# Patient Record
Sex: FEMALE | Race: WHITE | Employment: OTHER | URBAN - METROPOLITAN AREA
[De-identification: names, ages, dates, MRNs, and addresses within clinical notes are randomized per-mention and may not be internally consistent; named-entity substitution may affect disease eponyms.]

---

## 2017-02-27 ENCOUNTER — TELEPHONE (OUTPATIENT)
Dept: FAMILY MEDICINE | Facility: CLINIC | Age: 57
End: 2017-02-27

## 2017-02-27 NOTE — LETTER
[facility]  [address]  [facility] [address] [phone]                                                                                                           Martha Neal  [address] [address]    February 27, 2017          Dear Martha Neal,      We care about your well-being!  In order to ensure we are providing the best quality care, we have reviewed your chart and see that you are due for:     ___x__ Physical   _____ Pap Smear   _____ Mammogram   _____ Diabetes Followup   _____ Hypertension Followup   _____ Cholesterol Followup (Provider Appointment)   _____ Cholesterol Test (Lab-Only Appointment)   __x___ Other:  FIT (option other than colonoscopy, last 12/2015)     We can assist you in scheduling these appointments at [phone].    If you have had (or plan to have) any of these tests done at a facility other than [facility] or a [facility], please have the results from these tests sent to your primary physician at [facility].                 Sincerely,    JARED Carbajal

## 2017-02-27 NOTE — TELEPHONE ENCOUNTER
Panel Management Review      Patient has the following on her problem list: None      Composite cancer screening  Chart review shows that this patient is due/due soon for the following Fecal Colorectal (FIT)  Summary:    Patient is due/failing the following:   FIT and PHYSICAL    Action needed:   Patient needs office visit for physical. and Patient needs referral/order: FIT    Type of outreach:    Sent letter.    Questions for provider review:    None                                                                                                                                    [facility]       Chart routed to self.

## 2017-05-11 ENCOUNTER — OFFICE VISIT (OUTPATIENT)
Dept: FAMILY MEDICINE | Facility: CLINIC | Age: 57
End: 2017-05-11
Payer: COMMERCIAL

## 2017-05-11 VITALS
TEMPERATURE: 97.4 F | OXYGEN SATURATION: 98 % | WEIGHT: 165 LBS | DIASTOLIC BLOOD PRESSURE: 86 MMHG | HEART RATE: 74 BPM | SYSTOLIC BLOOD PRESSURE: 156 MMHG | BODY MASS INDEX: 29.7 KG/M2 | RESPIRATION RATE: 14 BRPM

## 2017-05-11 DIAGNOSIS — M54.50 ACUTE LEFT-SIDED LOW BACK PAIN WITHOUT SCIATICA: Primary | ICD-10-CM

## 2017-05-11 DIAGNOSIS — M51.26 LUMBAR DISC HERNIATION: ICD-10-CM

## 2017-05-11 PROCEDURE — 99214 OFFICE O/P EST MOD 30 MIN: CPT | Performed by: PHYSICIAN ASSISTANT

## 2017-05-11 RX ORDER — LORAZEPAM 0.5 MG/1
0.5 TABLET ORAL EVERY 8 HOURS PRN
Qty: 15 TABLET | Refills: 0 | Status: SHIPPED | OUTPATIENT
Start: 2017-05-11 | End: 2017-06-27

## 2017-05-11 RX ORDER — METHYLPREDNISOLONE 4 MG
TABLET, DOSE PACK ORAL
Qty: 21 TABLET | Refills: 0 | Status: SHIPPED | OUTPATIENT
Start: 2017-05-11 | End: 2017-06-27

## 2017-05-11 RX ORDER — HYDROCODONE BITARTRATE AND ACETAMINOPHEN 5; 325 MG/1; MG/1
1-2 TABLET ORAL EVERY 8 HOURS PRN
Qty: 30 TABLET | Refills: 0 | Status: SHIPPED | OUTPATIENT
Start: 2017-05-11 | End: 2017-06-27

## 2017-05-11 NOTE — MR AVS SNAPSHOT
After Visit Summary   5/11/2017    Martha Neal    MRN: [id number]           Patient Information     Date Of Birth          1960        Visit Information        Provider Department      5/11/2017 3:30 PM Jinny Raya PA-C [facility]        Today's Diagnoses     Acute left-sided low back pain without sciatica    -  1    Lumbar disc herniation          Care Instructions      Self-Care for Back Pain    Principles to encourage healing of musculoskeletal back pain:  1) Reduce Strain  2) Practice Relaxation  3) Good Posture  4) Progressive Stretching  5) Get a good night's sleep    Avoid muscle tensing habits and activities that put strain on the back.  Remind yourself regularly to determine if you are doing any tensing habits. Use reminders methods such as stickers or timers.  If noticed, replace these negative habits with a positive habit of having your head up, chest up, chin in, and shoulders down and back.       Back tensing    Sleeping on your stomach    Tensing or twist the back often    Sitting for hours at a time most days    Lifting objects by bending at the waist   Slouching while standing or walking    Slouching forward while sitting in a chair    Carrying heavy objects while bent at waist     Twisting the back to one side when working     Avoid tensing and poor posture while driving     Avoid events or activities that trigger the pain.  Use a pain diary to review daily activities that aggravate the pain and change your behavior.    Practice relaxation and deep breathing  Lay down on your back and take a deep breath from your abdomen up. Let it out slowly as you allow your back, neck and shoulders to relax.  This can calm you, reduce stress, and decrease muscle tensing.     Good Posture  Sitting:  Keep your butt to the back of the chair and sit with your shoulders and head up. This will help reduce strain to the back muscles.  Closely monitor  your back position (under your shoulders) to maintain balanced and relaxed head, neck, shoulder and back muscles.  Support your mid back with a rolled towel and lean back so that your the shoulders fall back, your neck is relaxed, and your head is up.    Lifting:  Use the squat lift for heavy objects. Start with the object between your legs.  Squat down, keep your head up, shoulders back, and spine erect.  Bend at the hips, knees, and ankles as you lift.   Use the golfer's lift for light objects by using some support (a chair, a desk, or a putter).  Put your hand on the support to take the load off your back as you bend over.      Progressive Stretching.  Stretch the back and hip muscles and joints.  One option would be to use sun salutation yoga exercises.   Touch the toes, reach above, one leg out, other leg, butt up, butt down, other leg up, and stretch.  This routine will stretch tight paraspinous, hamstrings, hip, and quadratus lumborum muscles.  Gently stretch 4 rotations for 2 times per day to loosen up tight backs.     Get a good night s sleep with a good semi-firm mattress.  Avoid caffeinated beverages (coffee, tea, and soft drinks) later in the day.  Improve your sleep environment.  Reduce light and noise and lie on a comfortable mattress.  Reduce stimulating activities in the late evening including computer work and exercising.  Avoid sleeping on your stomach.     Apply heat or cold and massage tender muscles.   Heat or ice applications used up to four times per day can relax the muscles and reduce pain. For heat, microwave a wet towel for approximately 1 minute or until towel is warm and wrap around a hot-water bottle or heated gel pack and apply for 15 to 20 minutes. For cold, use ice wrapped in a thin cloth on the area until you first feel some numbness. Use what feels best. Heat is often used for more chronic pain conditions and cold for acute conditions. Massage tender points in the muscles    Use  anti-inflammatory and pain-reducing medications.  Short-term and occasional use of over-the-counter ibuprofen, naproxen, acetaminophen, or aspirin (without caffeine) can reduce joint and muscle pain.  Be sure to check with your provider to be sure this would be safe.  Please note that extended daily use can sometimes cause rebound pain and an extend your symptoms.            Follow-ups after your visit        Who to contact     If you have questions or need follow up information about today's clinic visit or your schedule please contact [facility] directly at [phone].  Normal or non-critical lab and imaging results will be communicated to you by PageB[employer]hart, letter or phone within 4 business days after the clinic has received the results. If you do not hear from us within 7 days, please contact the clinic through [employer] or phone. If you have a critical or abnormal lab result, we will notify you by phone as soon as possible.  Submit refill requests through [employer] or call your pharmacy and they will forward the refill request to us. Please allow 3 business days for your refill to be completed.          Additional Information About Your Visit        MyChart Information     [employer] gives you secure access to your electronic health record. If you see a primary care provider, you can also send messages to your care team and make appointments. If you have questions, please call your primary care clinic.  If you do not have a primary care provider, please call [phone] and they will assist you.        Care EveryWhere ID     This is your Care EveryWhere ID. This could be used by other organizations to access your [facility] medical records  [id number]        Your Vitals Were     Pulse Temperature Respirations Pulse Oximetry BMI (Body Mass Index)       74 97.4  F (36.3  C) (Tympanic) 14 98% 29.7 kg/m2        Blood Pressure from Last 3 Encounters:   05/11/17 156/86   08/18/16 128/64    02/28/16 144/75    Weight from Last 3 Encounters:   05/11/17 165 lb (74.8 kg)   08/18/16 166 lb (75.3 kg)   12/10/15 173 lb (78.5 kg)              Today, you had the following     No orders found for display         Today's Medication Changes          These changes are accurate as of: 5/11/17  4:20 PM.  If you have any questions, ask your nurse or doctor.               Start taking these medicines.        Dose/Directions    HYDROcodone-acetaminophen 5-325 MG per tablet   Commonly known as:  NORCO   Used for:  Acute left-sided low back pain without sciatica   Started by:  Jinny Raya PA-C        Dose:  1-2 tablet   Take 1-2 tablets by mouth every 8 hours as needed for moderate to severe pain   Quantity:  30 tablet   Refills:  0       LORazepam 0.5 MG tablet   Commonly known as:  ATIVAN   Used for:  Acute left-sided low back pain without sciatica   Started by:  Jinny Raya PA-C        Dose:  0.5 mg   Take 1 tablet (0.5 mg) by mouth every 8 hours as needed   Quantity:  15 tablet   Refills:  0       methylPREDNISolone 4 MG tablet   Commonly known as:  MEDROL DOSEPAK   Used for:  Acute left-sided low back pain without sciatica   Started by:  Jinny Raya PA-C        Follow package instructions   Quantity:  21 tablet   Refills:  0            Where to get your medicines      These medications were sent to [place] [address] [place] [address] [address]     Phone:  [phone]     methylPREDNISolone 4 MG tablet         Some of these will need a paper prescription and others can be bought over the counter.  Ask your nurse if you have questions.     Bring a paper prescription for each of these medications     HYDROcodone-acetaminophen 5-325 MG per tablet    LORazepam 0.5 MG tablet                Primary Care Provider Office Phone # Fax #    Jinny Raya PA-C [phone]       [facility]  [facility] [address]   [facility] [address]        Thank you!     Thank you for choosing [facility]  for your care. Our goal is always to provide you with excellent care. Hearing back from our patients is one way we can continue to improve our services. Please take a few minutes to complete the written survey that you may receive in the mail after your visit with us. Thank you!             Your Updated Medication List - Protect others around you: Learn how to safely use, store and throw away your medicines at www.disposemymeds.org.          This list is accurate as of: 5/11/17  4:20 PM.  Always use your most recent med list.                   Brand Name Dispense Instructions for use    calcium 600 + D 600-400 MG-UNIT per tablet   Generic drug:  calcium-vitamin D      Take 1 tablet by mouth daily       Fish Oil 1200 MG Caps      Take 1 capsule by mouth       HYDROcodone-acetaminophen 5-325 MG per tablet    NORCO    30 tablet    Take 1-2 tablets by mouth every 8 hours as needed for moderate to severe pain       LORazepam 0.5 MG tablet    ATIVAN    15 tablet    Take 1 tablet (0.5 mg) by mouth every 8 hours as needed       methylPREDNISolone 4 MG tablet    MEDROL DOSEPAK    21 tablet    Follow package instructions       VITAMIN C PO          VITAMIN D (CHOLECALCIFEROL) PO      Take 1,000 Units by mouth daily

## 2017-05-11 NOTE — NURSING NOTE
"Chief Complaint   Patient presents with     Back Pain       Initial /86 (BP Location: Right arm, Patient Position: Chair, Cuff Size: Adult Regular)  Pulse 74  Temp 97.4  F (36.3  C) (Tympanic)  Resp 14  Wt 165 lb (74.8 kg)  SpO2 98%  BMI 29.7 kg/m2 Estimated body mass index is 29.7 kg/(m^2) as calculated from the following:    Height as of 8/18/16: 5' 2.5\" (1.588 m).    Weight as of this encounter: 165 lb (74.8 kg).  Medication Reconciliation: complete    "

## 2017-05-11 NOTE — PROGRESS NOTES
SUBJECTIVE:                                                    Martha Neal is a 57 year old female who presents to clinic today with her sister Eladia for the following health issues:    Back Pain      Duration: 5 days        Specific cause: bending to tie shoes    Description:   Location of pain: low back left  Character of pain: sharp  Pain radiation:none  New numbness or weakness in legs, not attributed to pain:  no     Intensity: Currently 2/10, worst 10/10    History:   Pain interferes with job: Not applicable  History of back problems: no prior back problems  Any previous MRI or X-rays: None  Sees a specialist for back pain:  No  Therapies tried without relief: ice, heat, ibuprofen, muscle relaxers - flexeril 10 mg without help, tens unit    Alleviating factors:   Improved by: nothing      Precipitating factors:  Worsened by: Lifting, Bending and Walking    Functional and Psychosocial Screen (Wandy STarT Back):      Not performed today   Accompanying Signs & Symptoms:  Risk of Fracture:  None  Risk of Cauda Equina:  None  Risk of Infection:  None  Risk of Cancer:  None  Risk of Ankylosing Spondylitis:  Onset at age <35, male, AND morning back stiffness. no                Reviewed and updated as needed this visit by clinical staff  Tobacco  Allergies  Meds  Problems  Med Hx  Surg Hx  Fam Hx  Soc Hx        Reviewed and updated as needed this visit by Provider  Tobacco  Allergies  Meds  Problems  Med Hx  Surg Hx  Fam Hx  Soc Hx        Additional complaints: None    HPI additional notes: Martha presents today with   Chief Complaint   Patient presents with     Back Pain     Has had problems with her back in the past but usually resolves with heat and ice after a couple days.  Has been having spasms and worsening of the pain.  Has significant pain with bending her back.         ROS:  C: Negative for fever, chills, recent change in weight  Skin: Negative for worrisome rashes or lesions  Resp:  Negative for significant cough or SOB  CV: Negative for chest pain or peripheral edema  GI: Negative for nausea, abdominal pain, heartburn, or change in bowel habits  MS: Positive for lower back pain  P: Negative for changes in mood or affect  ROS otherwise negative.    Chart Review:  History   Smoking Status     Current Every Day Smoker     Packs/day: 1.00     Years: 45.00     Types: Cigarettes   Smokeless Tobacco     Never Used     Patient Active Problem List   Diagnosis     BMI 30-35     Tobacco abuse     Primary osteoarthritis of left ankle     Abnormal fasting glucose     Past Surgical History:   Procedure Laterality Date     HYSTERECTOMY  2002     HYSTERECTOMY, PAP NO LONGER INDICATED       Problem list, Medication list, Allergies, Medical/Social/Surg hx reviewed in [employer], updated as appropriate.   OBJECTIVE:                                                    /86 (BP Location: Right arm, Patient Position: Chair, Cuff Size: Adult Regular)  Pulse 74  Temp 97.4  F (36.3  C) (Tympanic)  Resp 14  Wt 165 lb (74.8 kg)  SpO2 98%  BMI 29.7 kg/m2  Body mass index is 29.7 kg/(m^2).  GENERAL: alert, active and moderate distress  HENT: Mucous mebranes moist.  ORTHO: In significant pain moving from sitting to standing, needs assistance of two people to stand.  Lumber/Thoracic Spine Exam: Tender:  lumbar spinous processes, lumbar facet joints, left para lumbar muscles  Non-tender:  left SI joint, left sciatic notch  Range of Motion:  lumbar flexion  decreased, painful, lumbar extension  decreased, painful, left lateral lumbar bending  decreased, painful, right lateral lumbar bending  decreased, painful, left lateral lumbar rotation  decreased, painful, right lateral lumbar rotation  decreased, painful  Strength:  unable to heel walk, unable to toe walk  Special tests:  positive left straight leg raise  SKIN: no suspicious lesions, no rashes  PSYCH: Alert and oriented times 3;  Able to articulate logical thoughts.  Affect is normal.    Diagnostic test results: none      ASSESSMENT/PLAN:                                                          ICD-10-CM    1. Acute left-sided low back pain without sciatica M54.5 methylPREDNISolone (MEDROL DOSEPAK) 4 MG tablet     HYDROcodone-acetaminophen (NORCO) 5-325 MG per tablet     LORazepam (ATIVAN) 0.5 MG tablet   2. Lumbar disc herniation M51.26        Discussed suspected lumbar disc herniation.  Will start on medrol dose pack and provide norco for pain control.  Discussed avoiding staying in the same position for a prolonged period of time.  May need to sleep on a hard surface with a pillow between her legs.      Call if not significant improvement by Monday or if worsening over the weekend.    Pt significant anxious and guarded secondary to pain so will do small amount of ativan to help her relax.    Please see patient instructions for treatment details.    Follow up in 7-10 days if not improving as anticipated, sooner PRN.    Jinny Raya PA-C  [facility]

## 2017-06-04 ENCOUNTER — OFFICE VISIT (OUTPATIENT)
Dept: URGENT CARE | Facility: URGENT CARE | Age: 57
End: 2017-06-04
Payer: COMMERCIAL

## 2017-06-04 VITALS
HEART RATE: 97 BPM | DIASTOLIC BLOOD PRESSURE: 80 MMHG | WEIGHT: 166.2 LBS | SYSTOLIC BLOOD PRESSURE: 122 MMHG | OXYGEN SATURATION: 98 % | BODY MASS INDEX: 29.91 KG/M2 | TEMPERATURE: 98.6 F

## 2017-06-04 DIAGNOSIS — R30.0 DYSURIA: Primary | ICD-10-CM

## 2017-06-04 LAB
ALBUMIN UR-MCNC: ABNORMAL MG/DL
APPEARANCE UR: CLEAR
BACTERIA #/AREA URNS HPF: ABNORMAL /HPF
BILIRUB UR QL STRIP: NEGATIVE
COLOR UR AUTO: YELLOW
GLUCOSE UR STRIP-MCNC: NEGATIVE MG/DL
HGB UR QL STRIP: ABNORMAL
KETONES UR STRIP-MCNC: NEGATIVE MG/DL
LEUKOCYTE ESTERASE UR QL STRIP: NEGATIVE
NITRATE UR QL: NEGATIVE
NON-SQ EPI CELLS #/AREA URNS LPF: ABNORMAL /LPF
PH UR STRIP: 5.5 PH (ref 5–7)
RBC #/AREA URNS AUTO: ABNORMAL /HPF (ref 0–2)
SP GR UR STRIP: 1.02 (ref 1–1.03)
URN SPEC COLLECT METH UR: ABNORMAL
UROBILINOGEN UR STRIP-ACNC: 0.2 EU/DL (ref 0.2–1)
WBC #/AREA URNS AUTO: ABNORMAL /HPF (ref 0–2)

## 2017-06-04 PROCEDURE — 87086 URINE CULTURE/COLONY COUNT: CPT | Performed by: FAMILY MEDICINE

## 2017-06-04 PROCEDURE — 81001 URINALYSIS AUTO W/SCOPE: CPT | Performed by: FAMILY MEDICINE

## 2017-06-04 PROCEDURE — 99213 OFFICE O/P EST LOW 20 MIN: CPT | Performed by: FAMILY MEDICINE

## 2017-06-04 RX ORDER — NITROFURANTOIN 25; 75 MG/1; MG/1
100 CAPSULE ORAL 2 TIMES DAILY
Qty: 10 CAPSULE | Refills: 0 | Status: SHIPPED | OUTPATIENT
Start: 2017-06-04 | End: 2017-06-09

## 2017-06-04 NOTE — MR AVS SNAPSHOT
After Visit Summary   6/4/2017    Martha Neal    MRN: [id number]           Patient Information     Date Of Birth          1960        Visit Information        Provider Department      6/4/2017 9:35 AM Anthony Erickson, DO [facility]        Today's Diagnoses     Dysuria    -  1       Follow-ups after your visit        Who to contact     If you have questions or need follow up information about today's clinic visit or your schedule please contact [facility] directly at [phone].  Normal or non-critical lab and imaging results will be communicated to you by EmailFilm Technologieshart, letter or phone within 4 business days after the clinic has received the results. If you do not hear from us within 7 days, please contact the clinic through [employer] or phone. If you have a critical or abnormal lab result, we will notify you by phone as soon as possible.  Submit refill requests through [employer] or call your pharmacy and they will forward the refill request to us. Please allow 3 business days for your refill to be completed.          Additional Information About Your Visit        MyChart Information     [employer] gives you secure access to your electronic health record. If you see a primary care provider, you can also send messages to your care team and make appointments. If you have questions, please call your primary care clinic.  If you do not have a primary care provider, please call [phone] and they will assist you.        Care EveryWhere ID     This is your Care EveryWhere ID. This could be used by other organizations to access your [facility] medical records  [id number]        Your Vitals Were     Pulse Temperature Pulse Oximetry Breastfeeding? BMI (Body Mass Index)       97 98.6  F (37  C) (Oral) 98% Unknown 29.91 kg/m2        Blood Pressure from Last 3 Encounters:   06/04/17 122/80   05/11/17 156/86   08/18/16 128/64    Weight from Last 3  Encounters:   06/04/17 166 lb 3.2 oz (75.4 kg)   05/11/17 165 lb (74.8 kg)   08/18/16 166 lb (75.3 kg)              We Performed the Following     UA with Microscopic reflex to Culture     Urine Culture Aerobic Bacterial          Today's Medication Changes          These changes are accurate as of: 6/4/17 10:25 AM.  If you have any questions, ask your nurse or doctor.               Start taking these medicines.        Dose/Directions    nitrofurantoin (macrocrystal-monohydrate) 100 MG capsule   Commonly known as:  MACROBID   Used for:  Dysuria   Started by:  Anthony Erickson DO        Dose:  100 mg   Take 1 capsule (100 mg) by mouth 2 times daily for 5 days   Quantity:  10 capsule   Refills:  0            Where to get your medicines      These medications were sent to [facility] [id number] [place] [facility] [address]  [address], [facility] [address]     Phone:  [phone]     nitrofurantoin (macrocrystal-monohydrate) 100 MG capsule                Primary Care Provider Office Phone # Fax #    Jinny Raya PA-C [phone]       [facility] [address]   [facility] [address]        Thank you!     Thank you for choosing [facility]  for your care. Our goal is always to provide you with excellent care. Hearing back from our patients is one way we can continue to improve our services. Please take a few minutes to complete the written survey that you may receive in the mail after your visit with us. Thank you!             Your Updated Medication List - Protect others around you: Learn how to safely use, store and throw away your medicines at www.disposemymeds.org.          This list is accurate as of: 6/4/17 10:25 AM.  Always use your most recent med list.                   Brand Name Dispense Instructions for use    calcium 600 + D 600-400 MG-UNIT per tablet   Generic drug:  calcium-vitamin D      Take 1 tablet by mouth  daily       Fish Oil 1200 MG Caps      Take 1 capsule by mouth       HYDROcodone-acetaminophen 5-325 MG per tablet    NORCO    30 tablet    Take 1-2 tablets by mouth every 8 hours as needed for moderate to severe pain       LORazepam 0.5 MG tablet    ATIVAN    15 tablet    Take 1 tablet (0.5 mg) by mouth every 8 hours as needed       methylPREDNISolone 4 MG tablet    MEDROL DOSEPAK    21 tablet    Follow package instructions       nitrofurantoin (macrocrystal-monohydrate) 100 MG capsule    MACROBID    10 capsule    Take 1 capsule (100 mg) by mouth 2 times daily for 5 days       VITAMIN C PO          VITAMIN D (CHOLECALCIFEROL) PO      Take 1,000 Units by mouth daily

## 2017-06-04 NOTE — NURSING NOTE
"Chief Complaint   Patient presents with     UTI     frequency urniation, burning sensation 3x days        Initial /80 (BP Location: Left arm, Patient Position: Chair, Cuff Size: Adult Regular)  Pulse 97  Temp 98.6  F (37  C) (Oral)  Wt 166 lb 3.2 oz (75.4 kg)  SpO2 98%  Breastfeeding? Unknown  BMI 29.91 kg/m2 Estimated body mass index is 29.91 kg/(m^2) as calculated from the following:    Height as of 8/18/16: 5' 2.5\" (1.588 m).    Weight as of this encounter: 166 lb 3.2 oz (75.4 kg).  Medication Reconciliation: complete    "

## 2017-06-04 NOTE — PROGRESS NOTES
SUBJECTIVE: Martha Neal is a 57 year old female who  presents today for a possible UTI.   Symptoms of dysuria have been going on for 2day(s).    Hematuria no.  still presentand moderate.    There is no history of fever, chills, nausea or vomiting.   This patient does  have a history of urinary tract infections.   Patient denies long duration and flank pain    No past medical history on file.  Allergies   Allergen Reactions     Banana GI Disturbance     Food GI Disturbance     Green and red bell peppers     Social History   Substance Use Topics     Smoking status: Current Every Day Smoker     Packs/day: 1.00     Years: 45.00     Types: Cigarettes     Smokeless tobacco: Never Used     Alcohol use No       ROS: CONSTITUTIONAL:NEGATIVE for fever, chills, change in weight    OBJECTIVE:  /80 (BP Location: Left arm, Patient Position: Chair, Cuff Size: Adult Regular)  Pulse 97  Temp 98.6  F (37  C) (Oral)  Wt 166 lb 3.2 oz (75.4 kg)  SpO2 98%  Breastfeeding? Unknown  BMI 29.91 kg/m2    No Flank/abd pain      ICD-10-CM    1. Dysuria R30.0 UA with Microscopic reflex to Culture     Urine Culture Aerobic Bacterial     nitrofurantoin, macrocrystal-monohydrate, (MACROBID) 100 MG capsule       Drink plenty of fluids.  Prevention and treatment of UTI's discussed.Signs and symptoms of pyelonephritis mentioned.  Follow up with primary care physician if not improving

## 2017-06-05 LAB
BACTERIA SPEC CULT: NORMAL
MICRO REPORT STATUS: NORMAL
SPECIMEN SOURCE: NORMAL

## 2017-06-27 ENCOUNTER — OFFICE VISIT (OUTPATIENT)
Dept: FAMILY MEDICINE | Facility: CLINIC | Age: 57
End: 2017-06-27
Payer: COMMERCIAL

## 2017-06-27 VITALS
OXYGEN SATURATION: 98 % | WEIGHT: 165 LBS | DIASTOLIC BLOOD PRESSURE: 82 MMHG | BODY MASS INDEX: 31.15 KG/M2 | TEMPERATURE: 97.3 F | HEIGHT: 61 IN | SYSTOLIC BLOOD PRESSURE: 130 MMHG | HEART RATE: 104 BPM | RESPIRATION RATE: 14 BRPM

## 2017-06-27 DIAGNOSIS — Z12.11 SCREENING FOR COLON CANCER: ICD-10-CM

## 2017-06-27 DIAGNOSIS — D22.9 ATYPICAL NEVI: ICD-10-CM

## 2017-06-27 DIAGNOSIS — R73.01 ABNORMAL FASTING GLUCOSE: ICD-10-CM

## 2017-06-27 DIAGNOSIS — Z00.00 ROUTINE GENERAL MEDICAL EXAMINATION AT A HEALTH CARE FACILITY: Primary | ICD-10-CM

## 2017-06-27 DIAGNOSIS — Z12.31 VISIT FOR SCREENING MAMMOGRAM: ICD-10-CM

## 2017-06-27 DIAGNOSIS — M54.50 RIGHT-SIDED LOW BACK PAIN WITHOUT SCIATICA, UNSPECIFIED CHRONICITY: ICD-10-CM

## 2017-06-27 LAB
CHOLEST SERPL-MCNC: 201 MG/DL
HBA1C MFR BLD: 6.7 % (ref 4.3–6)
HDLC SERPL-MCNC: 71 MG/DL
LDLC SERPL CALC-MCNC: 100 MG/DL
NONHDLC SERPL-MCNC: 130 MG/DL
TRIGL SERPL-MCNC: 152 MG/DL

## 2017-06-27 PROCEDURE — 83036 HEMOGLOBIN GLYCOSYLATED A1C: CPT | Performed by: PHYSICIAN ASSISTANT

## 2017-06-27 PROCEDURE — 99396 PREV VISIT EST AGE 40-64: CPT | Performed by: PHYSICIAN ASSISTANT

## 2017-06-27 PROCEDURE — 80061 LIPID PANEL: CPT | Performed by: PHYSICIAN ASSISTANT

## 2017-06-27 PROCEDURE — 36415 COLL VENOUS BLD VENIPUNCTURE: CPT | Performed by: PHYSICIAN ASSISTANT

## 2017-06-27 NOTE — PROGRESS NOTES
SUBJECTIVE:     CC: Martha Neal is an 57 year old woman who presents for preventive health visit.     Healthy Habits:    Do you get at least three servings of calcium containing foods daily (dairy, green leafy vegetables, etc.)? yes    Amount of exercise or daily activities, outside of work: 7 day(s) per week    Problems taking medications regularly No    Medication side effects: No    Have you had an eye exam in the past two years? yes    Do you see a dentist twice per year? yes    Do you have sleep apnea, excessive snoring or daytime drowsiness?no    Back pain still present with stiffness but better.  Hasn't had to take any back pain.      skin tag on left side of neck.      Fasting      Today's PHQ-2 Score:   PHQ-2 ( 1999 Pf[employer]) 6/27/2017 5/11/2017   Q1: Little interest or pleasure in doing things 0 0   Q2: Feeling down, depressed or hopeless 0 0   PHQ-2 Score 0 0       Abuse: Current or Past(Physical, Sexual or Emotional)- No  Do you feel safe in your environment - Yes    Social History   Substance Use Topics     Smoking status: Current Every Day Smoker     Packs/day: 1.00     Years: 45.00     Types: Cigarettes     Smokeless tobacco: Never Used     Alcohol use No     The patient does not drink >3 drinks per day nor >7 drinks per week.    Recent Labs   Lab Test  12/10/15   1000   CHOL  223*   HDL  67   LDL  134*   TRIG  108   NHDL  156*       Reviewed orders with patient.  Reviewed health maintenance and updated orders accordingly - Yes    Mammo Decision Support:  Patient over age 50, mutual decision to screen reflected in health maintenance.    Pertinent mammograms are reviewed under the imaging tab.  History of abnormal Pap smear: Status post benign hysterectomy. Health Maintenance and Surgical History updated.    Reviewed and updated as needed this visit by clinical staff  Tobacco  Allergies  Meds  Problems  Med Hx  Surg Hx  Fam Hx  Soc Hx          Reviewed and updated as needed this visit by  "Provider  Tobacco  Allergies  Problems  Med Hx  Surg Hx  Fam Hx  Soc Hx         ROS:  C: NEGATIVE for fever, chills, change in weight  INTEGUMENTARY/SKIN: POSITIVE for mole changes  E: NEGATIVE for vision changes or irritation  ENT: NEGATIVE for ear, mouth and throat problems  R: NEGATIVE for significant cough or SOB  B: NEGATIVE for masses, tenderness or discharge  CV: NEGATIVE for chest pain, palpitations or peripheral edema  GI: NEGATIVE for nausea, abdominal pain, heartburn, or change in bowel habits  : NEGATIVE for unusual urinary or vaginal symptoms. No vaginal bleeding.  MUSCULOSKELETAL:POSITIVE  for right sided low back pain and stiffness.  N: NEGATIVE for weakness, dizziness or paresthesias  E: NEGATIVE for temperature intolerance, skin/hair changes  P: NEGATIVE for changes in mood or affect     Problem list, Medication list, Allergies, and Medical/Social/Surgical histories reviewed in [facility] and updated as appropriate.  BP Readings from Last 3 Encounters:   06/27/17 130/82   06/04/17 122/80   05/11/17 156/86    Wt Readings from Last 3 Encounters:   06/27/17 165 lb (74.8 kg)   06/04/17 166 lb 3.2 oz (75.4 kg)   05/11/17 165 lb (74.8 kg)          OBJECTIVE:     /82  Pulse 104  Temp 97.3  F (36.3  C) (Tympanic)  Resp 14  Ht 5' 1\" (1.549 m)  Wt 165 lb (74.8 kg)  LMP  (LMP Unknown)  SpO2 98%  Breastfeeding? No  BMI 31.18 kg/m2  EXAM:  GENERAL APPEARANCE: healthy, alert and no distress  EYES: Eyes grossly normal to inspection, PERRL and conjunctivae and sclerae normal  HENT: ear canals and TM's normal, nose and mouth without ulcers or lesions, oropharynx clear and oral mucous membranes moist  NECK: no adenopathy, no asymmetry, masses, or scars and thyroid normal to palpation  RESP: lungs clear to auscultation - no rales, rhonchi or wheezes  BREAST: normal without masses, tenderness or nipple discharge and no palpable axillary masses or adenopathy  CV: regular rate and rhythm, normal S1 S2, " "no S3 or S4, no murmur, click or rub, no peripheral edema and peripheral pulses strong  ABDOMEN: soft, nontender, no hepatosplenomegaly, no masses and bowel sounds normal  MS: no musculoskeletal defects are noted, gait is age appropriate without ataxia.  Tenderness to palpation of right para lumbar muscles.  no tenderness to palpation of lumbar spine.  Negative straight leg raises.  SKIN: 5 mm hanging nevi with multiple shades of brown with piece missing from the center.  No surrounding erythema or warmth.  NEURO: Normal strength and tone, sensory exam grossly normal, mentation intact and speech normal  PSYCH: mentation appears normal and affect normal/bright    ASSESSMENT/PLAN:         ICD-10-CM    1. Routine general medical examination at a health care facility Z00.00 LIPID REFLEX TO DIRECT LDL PANEL   2. Visit for screening mammogram Z12.31 MA Screening Digital Bilateral   3. Abnormal fasting glucose R73.01 Hemoglobin A1c   4. Screening for colon cancer Z12.11 Fecal colorectal cancer screen FIT   5. Atypical nevi D22.9 DERMATOLOGY REFERRAL   6. Right-sided low back pain without sciatica, unspecified chronicity M54.5    Follow up with dermatology about mole.    Discussed persistent low back pain, previously seen May 11th.  Has improved but still has pain and stiffness.  Will work on exercises, heat and massage at home.  If not improving in 1-2 weeks, pt will call and I will put in an order for PT.    COUNSELING:   Reviewed preventive health counseling, as reflected in patient instructions       reports that she has been smoking Cigarettes.  She has a 45.00 pack-year smoking history. She has never used smokeless tobacco.  Tobacco Cessation Action Plan: Information offered: Patient not interested at this time  Estimated body mass index is 31.18 kg/(m^2) as calculated from the following:    Height as of this encounter: 5' 1\" (1.549 m).    Weight as of this encounter: 165 lb (74.8 kg).   Weight management plan: : -30 " minutes of exercise 5 days a week (walking, jogging, housework, biking).  -Eat at least 5 servings of fruits and vegetables daily.   -Eat whole-grain bread, whole-wheat pasta and brown rice instead of white grains and rice.      Counseling Resources:  ATP IV Guidelines  Pooled Cohorts Equation Calculator  Breast Cancer Risk Calculator  FRAX Risk Assessment  ICSI Preventive Guidelines  Dietary Guidelines for Americans, 2010  USDA's MyPlate  ASA Prophylaxis  Lung CA Screening    Jinny Raya PA-C  [facility]

## 2017-06-27 NOTE — NURSING NOTE
"Chief Complaint   Patient presents with     Physical     /82  Pulse 104  Temp 97.3  F (36.3  C) (Tympanic)  Resp 14  Ht 5' 1\" (1.549 m)  Wt 165 lb (74.8 kg)  LMP  (LMP Unknown)  SpO2 98%  Breastfeeding? No  BMI 31.18 kg/m2 Estimated body mass index is 31.18 kg/(m^2) as calculated from the following:    Height as of this encounter: 5' 1\" (1.549 m).    Weight as of this encounter: 165 lb (74.8 kg).  BP completed using cuff size: regular   Cathy Trinidad CMA    Health Maintenance Due   Topic Date Due     TOBACCO CESSATION COUNSELING Q1 YR  1960     FIT Q1 YR  12/13/2016     Health Maintenance reviewed at today's visit patient asked to schedule/complete:   None, Health Maintenance up to date.    "

## 2017-06-27 NOTE — LETTER
[facility]  [address]  [place] [address]  [phone]                                                                                                           Martha Neal  [address]  [place] [address]    June 28, 2017      Dear Martha,    The results of your recent tests were reviewed and are enclosed.     - Your cholesterol is high and we may need to start a cholesterol medication to decrease your risk of heart disease.  We'll recheck next year.  - Hemoglobin A1c is a test shows your blood sugar level over the last 2-3 months. A normal level for someone who does not have diabetes is 4-6 (fasting blood sugar <126) - Your A1c is slightly worsened from last year but may be higher than it should because you were on prednisone for your back, which temporarily raises your blood sugar.  I want to recheck your A1c in 3 months.  If we have another reading over 6.5, that puts you in the range of diabetes.  Work on increasing physical activity and decreasing sugars and carbs from your diet in the meantime.  - Please make a lab only appointment in 3 months for a recheck or we can recheck in mid November if you want to make sure I'm no longer on maternity leave.    Results for orders placed or performed in visit on 06/27/17   LIPID REFLEX TO DIRECT LDL PANEL   Result Value Ref Range    Cholesterol 201 (H) <200 mg/dL    Triglycerides 152 (H) <150 mg/dL    HDL Cholesterol 71 >49 mg/dL    LDL Cholesterol Calculated 100 (H) <100 mg/dL    Non HDL Cholesterol 130 (H) <130 mg/dL   Hemoglobin A1c   Result Value Ref Range    Hemoglobin A1C 6.7 (H) 4.3 - 6.0 %   Thank you for choosing [facility].  We appreciate the opportunity to serve you and look forward to supporting your healthcare needs in the future.    If you have any questions or concerns, please call me or my staff at [phone].    Sincerely,    Jinny  Norah Raya PA-C

## 2017-06-27 NOTE — MR AVS SNAPSHOT
After Visit Summary   6/27/2017    Martha Neal    MRN: [id number]           Patient Information     Date Of Birth          1960        Visit Information        Provider Department      6/27/2017 9:30 AM Jinny Raya PA-C [facility]        Today's Diagnoses     Routine general medical examination at a health care facility    -  1    Visit for screening mammogram        Abnormal fasting glucose        Screening for colon cancer        Atypical nevi        Right-sided low back pain without sciatica, unspecified chronicity          Care Instructions      Preventive Health Recommendations  Female Ages 50 - 64    Yearly exam: See your health care provider every year in order to  o Review health changes.   o Discuss preventive care.    o Review your medicines if your doctor has prescribed any.      Get a Pap test every three years (unless you have an abnormal result and your provider advises testing more often).    If you get Pap tests with HPV test, you only need to test every 5 years, unless you have an abnormal result.     You do not need a Pap test if your uterus was removed (hysterectomy) and you have not had cancer.    You should be tested each year for STDs (sexually transmitted diseases) if you're at risk.     Have a mammogram every 1 to 2 years.    Have a colonoscopy at age 50, or have a yearly FIT test (stool test). These exams screen for colon cancer.      Have a cholesterol test every 5 years, or more often if advised.    Have a diabetes test (fasting glucose) every three years. If you are at risk for diabetes, you should have this test more often.     If you are at risk for osteoporosis (brittle bone disease), think about having a bone density scan (DEXA).    Shots: Get a flu shot each year. Get a tetanus shot every 10 years.    Nutrition:     Eat at least 5 servings of fruits and vegetables each day.    Eat whole-grain bread, whole-wheat  pasta and brown rice instead of white grains and rice.    Talk to your provider about Calcium and Vitamin D.     Lifestyle    Exercise at least 150 minutes a week (30 minutes a day, 5 days a week). This will help you control your weight and prevent disease.    Limit alcohol to one drink per day.    No smoking.     Wear sunscreen to prevent skin cancer.     See your dentist every six months for an exam and cleaning.    See your eye doctor every 1 to 2 years.            Follow-ups after your visit        Additional Services     DERMATOLOGY REFERRAL       Your provider has referred you to: [facility]: [facility] [phone]   [email or web address]  [facility]: [facility] [phone]   [email or web address]    Please be aware that coverage of these services is subject to the terms and limitations of your health insurance plan.  Call member services at your health plan with any benefit or coverage questions.      Please bring the following with you to your appointment:    (1) Any X-Rays, CTs or MRIs which have been performed.  Contact the facility where they were done to arrange for  prior to your scheduled appointment.    (2) List of current medications  (3) This referral request   (4) Any documents/labs given to you for this referral                  Future tests that were ordered for you today     Open Future Orders        Priority Expected Expires Ordered    MA Screening Digital Bilateral Routine 7/27/2017 6/22/2018 6/27/2017    Fecal colorectal cancer screen FIT Routine 7/18/2017 9/19/2017 6/27/2017            Who to contact     If you have questions or need follow up information about today's clinic visit or your schedule please contact [facility] directly at [phone].  Normal or non-critical lab and imaging results will be communicated to you by MyChart, letter or phone within 4 business days  "after the clinic has received the results. If you do not hear from us within 7 days, please contact the clinic through [employer] or phone. If you have a critical or abnormal lab result, we will notify you by phone as soon as possible.  Submit refill requests through [employer] or call your pharmacy and they will forward the refill request to us. Please allow 3 business days for your refill to be completed.          Additional Information About Your Visit        [employer] Information     [employer] gives you secure access to your electronic health record. If you see a primary care provider, you can also send messages to your care team and make appointments. If you have questions, please call your primary care clinic.  If you do not have a primary care provider, please call [phone] and they will assist you.        Care EveryWhere ID     This is your Care EveryWhere ID. This could be used by other organizations to access your [place] medical records  [id number]        Your Vitals Were     Pulse Temperature Respirations Height Last Period Pulse Oximetry    104 97.3  F (36.3  C) (Tympanic) 14 5' 1\" (1.549 m) (LMP Unknown) 98%    Breastfeeding? BMI (Body Mass Index)                No 31.18 kg/m2           Blood Pressure from Last 3 Encounters:   06/27/17 130/82   06/04/17 122/80   05/11/17 156/86    Weight from Last 3 Encounters:   06/27/17 165 lb (74.8 kg)   06/04/17 166 lb 3.2 oz (75.4 kg)   05/11/17 165 lb (74.8 kg)              We Performed the Following     DERMATOLOGY REFERRAL     Hemoglobin A1c     LIPID REFLEX TO DIRECT LDL PANEL          Today's Medication Changes          These changes are accurate as of: 6/27/17  9:53 AM.  If you have any questions, ask your nurse or doctor.               Stop taking these medicines if you haven't already. Please contact your care team if you have questions.     HYDROcodone-acetaminophen 5-325 MG per tablet   Commonly known as:  NORCO   Stopped by:  Jinny Raya, " OCHOA           LORazepam 0.5 MG tablet   Commonly known as:  ATIVAN   Stopped by:  Jinny Raya PA-C           methylPREDNISolone 4 MG tablet   Commonly known as:  MEDROL DOSEPAK   Stopped by:  Jinny Raya PA-C                    Primary Care Provider Office Phone # Fax #    Jinny Raya PA-C [phone]       [facility] [address]  [place] [address]        Equal Access to Services     [facility] Hospital[facility]ELIT[facility] : Hadii aad ku hadasho Soomaali, waaxda luqadaha, qaybta kaalmada adeegyada, waxay idiin hayaan adeeg kharash larylan . So [facility] [phone].    ATENCIÓN: Si habla español, tiene a maciel disposición servicios gratuitos de asistencia lingüística. Ll[facility] [phone].    We comply with applicable federal civil rights laws and [place] laws. We do not discriminate on the basis of race, color, national origin, age, disability sex, sexual orientation or gender identity.            Thank you!     Thank you for choosing [facility]  for your care. Our goal is always to provide you with excellent care. Hearing back from our patients is one way we can continue to improve our services. Please take a few minutes to complete the written survey that you may receive in the mail after your visit with us. Thank you!             Your Updated Medication List - Protect others around you: Learn how to safely use, store and throw away your medicines at www.disposemymeds.org.          This list is accurate as of: 6/27/17  9:53 AM.  Always use your most recent med list.                   Brand Name Dispense Instructions for use Diagnosis    calcium 600 + D 600-400 MG-UNIT per tablet   Generic drug:  calcium-vitamin D      Take 1 tablet by mouth daily        Fish Oil 1200 MG Caps      Take 1 capsule by mouth        * VITAMIN C PO           * VITAMIN C PO           VITAMIN D (CHOLECALCIFEROL) PO      Take 1,000 Units by  mouth daily        * Notice:  This list has 2 medication(s) that are the same as other medications prescribed for you. Read the directions carefully, and ask your doctor or other care provider to review them with you.

## 2017-06-29 PROCEDURE — 82274 ASSAY TEST FOR BLOOD FECAL: CPT | Performed by: PHYSICIAN ASSISTANT

## 2017-06-30 DIAGNOSIS — Z12.11 SCREENING FOR COLON CANCER: ICD-10-CM

## 2017-06-30 LAB — HEMOCCULT STL QL IA: NEGATIVE

## 2017-07-07 ENCOUNTER — RADIANT APPOINTMENT (OUTPATIENT)
Dept: MAMMOGRAPHY | Facility: CLINIC | Age: 57
End: 2017-07-07
Attending: PHYSICIAN ASSISTANT
Payer: COMMERCIAL

## 2017-07-07 DIAGNOSIS — Z12.31 VISIT FOR SCREENING MAMMOGRAM: ICD-10-CM

## 2017-07-07 PROCEDURE — G0202 SCR MAMMO BI INCL CAD: HCPCS | Mod: TC

## 2017-07-11 ENCOUNTER — OFFICE VISIT (OUTPATIENT)
Dept: DERMATOLOGY | Facility: CLINIC | Age: 57
End: 2017-07-11
Payer: COMMERCIAL

## 2017-07-11 VITALS — DIASTOLIC BLOOD PRESSURE: 91 MMHG | OXYGEN SATURATION: 98 % | SYSTOLIC BLOOD PRESSURE: 139 MMHG | HEART RATE: 102 BPM

## 2017-07-11 DIAGNOSIS — L81.4 LENTIGO: ICD-10-CM

## 2017-07-11 DIAGNOSIS — D22.9 NEVUS: ICD-10-CM

## 2017-07-11 DIAGNOSIS — D48.5 NEOPLASM OF UNCERTAIN BEHAVIOR OF SKIN: Primary | ICD-10-CM

## 2017-07-11 PROCEDURE — 00000159 ZZHCL STATISTIC H-SEND OUTS PREP: Performed by: PHYSICIAN ASSISTANT

## 2017-07-11 PROCEDURE — 99203 OFFICE O/P NEW LOW 30 MIN: CPT | Mod: 25 | Performed by: PHYSICIAN ASSISTANT

## 2017-07-11 PROCEDURE — 88305 TISSUE EXAM BY PATHOLOGIST: CPT | Performed by: PHYSICIAN ASSISTANT

## 2017-07-11 PROCEDURE — 11100 HC BIOPSY SKIN/SUBQ/MUC MEM, SINGLE LESION: CPT | Performed by: PHYSICIAN ASSISTANT

## 2017-07-11 NOTE — PATIENT INSTRUCTIONS
Wound Care Instructions     FOR SUPERFICIAL WOUNDS     [facility] [phone]                       AFTER 24 HOURS YOU SHOULD REMOVE THE BANDAGE AND BEGIN DAILY DRESSING CHANGES AS FOLLOWS:     1) Remove Dressing.     2) Clean and dry the area with tap water using a Q-tip or sterile gauze pad.     3) Apply Vaseline, Aquaphor, Polysporin ointment or Bacitracin ointment over entire wound.  Do NOT use Neosporin ointment.     4) Cover the wound with a band-aid, or a sterile non-stick gauze pad and micropore paper tape      REPEAT THESE INSTRUCTIONS AT LEAST ONCE A DAY UNTIL THE WOUND HAS COMPLETELY HEALED.    It is an old wives tale that a wound heals better when it is exposed to air and allowed to dry out. The wound will heal faster with a better cosmetic result if it is kept moist with ointment and covered with a bandage.    **Do not let the wound dry out.**      Supplies Needed:      *Cotton tipped applicators (Q-tips)    *Polysporin Ointment or Bacitracin Ointment (NOT NEOSPORIN)    *Band-aids or non-stick gauze pads and micropore paper tape.      PATIENT INFORMATION:    During the healing process you will notice a number of changes. All wounds develop a small halo of redness surrounding the wound.  This means healing is occurring. Severe itching with extensive redness usually indicates sensitivity to the ointment or bandage tape used to dress the wound.  You should call our office if this develops.      Swelling  and/or discoloration around your surgical site is common, particularly when performed around the eye.    All wounds normally drain.  The larger the wound the more drainage there will be.  After 7-10 days, you will notice the wound beginning to shrink and new skin will begin to grow.  The wound is healed when you can see skin has formed over the entire area.  A healed wound has a healthy, shiny look to the surface and is red to dark pink in color to normalize.  Wounds may take  approximately 4-6 weeks to heal.  Larger wounds may take 6-8 weeks.  After the wound is healed you may discontinue dressing changes.    You may experience a sensation of tightness as your wound heals. This is normal and will gradually subside.    Your healed wound may be sensitive to temperature changes. This sensitivity improves with time, but if you re having a lot of discomfort, try to avoid temperature extremes.    Patients frequently experience itching after their wound appears to have healed because of the continue healing under the skin.  Plain Vaseline will help relieve the itching.        POSSIBLE COMPLICATIONS    BLEEDIN. Leave the bandage in place.  2. Use tightly rolled up gauze or a cloth to apply direct pressure over the bandage for 30  minutes.  3. Reapply pressure for an additional 30 minutes if necessary  4. Use additional gauze and tape to maintain pressure once the bleeding has stopped.

## 2017-07-11 NOTE — NURSING NOTE
"Initial BP (!) 139/91  Pulse 102  LMP  (LMP Unknown)  SpO2 98% Estimated body mass index is 31.18 kg/(m^2) as calculated from the following:    Height as of 6/27/17: 1.549 m (5' 1\").    Weight as of 6/27/17: 74.8 kg (165 lb). .      "

## 2017-07-11 NOTE — PROGRESS NOTES
HPI:   Martha Neal is a 57 year old female who presents for evaluation of mole on neck  chief complaint  Location: left side of the neck - is tender and there is a black dot in the middle of it.   Condition present for:  years.   Previous treatments include: none  -no personal or fhx of skin CA    Review Of Systems  Eyes: negative  Ears/Nose/Throat: negative  Respiratory: No shortness of breath, dyspnea on exertion, cough, or hemoptysis  Cardiovascular: negative  Gastrointestinal: negative  Genitourinary: negative  Musculoskeletal: negative  Neurologic: negative  Psychiatric: negative        PHYSICAL EXAM:      Skin exam performed as follows: Type 2 skin. Mood appropriate  Alert and Oriented X 3. Well developed, well nourished in no distress.  General appearance: Normal  Head including face: Normal  Eyes: conjunctiva and lids: Normal  Mouth: Lips, teeth, gums: Normal  Neck: Normal  Chest-breast/axillae: Normal  Back: Normal  Spleen and liver: Normal  Cardiovascular: Exam of peripheral vascular system by observation for swelling, varicosities, edema: Normal  Genitalia: groin, buttocks: Normal  Extremities: digits/nails (clubbing): Normal  Eccrine and Apocrine glands: Normal  Right upper extremity: Normal  Left upper extremity: Normal  Right lower extremity: Normal  Left lower extremity: Normal  Skin: Scalp and body hair: See below    1. 4 mm fleshy excoriated papule on left lateral neck    ASSESSMENT/PLAN:     1. Dermal nevus vs ISK r/o atypicality on left lateral neck. Shave bx in typical fashion .  Area cleaned with betadyne and anesthetized with 1% lidocaine with epi .  Dermablade used to remove the lesion and sent to pathology. Bleeding was cauterized. Pt tolerated procedure well.  2. Nevi, lentigos on face, neck, chest - benign in appearance no treatment needed.           Follow-up: pending path/PRN/yearly FSE  CC:   Scribed By: Nancy Rodriguez, MS, PATRISHA

## 2017-07-11 NOTE — MR AVS SNAPSHOT
After Visit Summary   7/11/2017    Martha Neal    MRN: [id number]           Patient Information     Date Of Birth          1960        Visit Information        Provider Department      7/11/2017 8:30 AM Nancy Rodriguez PA-C [facility]        Today's Diagnoses     Neoplasm of uncertain behavior of skin    -  1      Care Instructions          Wound Care Instructions     FOR SUPERFICIAL WOUNDS     [facility] [phone]                       AFTER 24 HOURS YOU SHOULD REMOVE THE BANDAGE AND BEGIN DAILY DRESSING CHANGES AS FOLLOWS:     1) Remove Dressing.     2) Clean and dry the area with tap water using a Q-tip or sterile gauze pad.     3) Apply Vaseline, Aquaphor, Polysporin ointment or Bacitracin ointment over entire wound.  Do NOT use Neosporin ointment.     4) Cover the wound with a band-aid, or a sterile non-stick gauze pad and micropore paper tape      REPEAT THESE INSTRUCTIONS AT LEAST ONCE A DAY UNTIL THE WOUND HAS COMPLETELY HEALED.    It is an old wives tale that a wound heals better when it is exposed to air and allowed to dry out. The wound will heal faster with a better cosmetic result if it is kept moist with ointment and covered with a bandage.    **Do not let the wound dry out.**      Supplies Needed:      *Cotton tipped applicators (Q-tips)    *Polysporin Ointment or Bacitracin Ointment (NOT NEOSPORIN)    *Band-aids or non-stick gauze pads and micropore paper tape.      PATIENT INFORMATION:    During the healing process you will notice a number of changes. All wounds develop a small halo of redness surrounding the wound.  This means healing is occurring. Severe itching with extensive redness usually indicates sensitivity to the ointment or bandage tape used to dress the wound.  You should call our office if this develops.      Swelling  and/or discoloration around your surgical site is common, particularly when performed around the  eye.    All wounds normally drain.  The larger the wound the more drainage there will be.  After 7-10 days, you will notice the wound beginning to shrink and new skin will begin to grow.  The wound is healed when you can see skin has formed over the entire area.  A healed wound has a healthy, shiny look to the surface and is red to dark pink in color to normalize.  Wounds may take approximately 4-6 weeks to heal.  Larger wounds may take 6-8 weeks.  After the wound is healed you may discontinue dressing changes.    You may experience a sensation of tightness as your wound heals. This is normal and will gradually subside.    Your healed wound may be sensitive to temperature changes. This sensitivity improves with time, but if you re having a lot of discomfort, try to avoid temperature extremes.    Patients frequently experience itching after their wound appears to have healed because of the continue healing under the skin.  Plain Vaseline will help relieve the itching.        POSSIBLE COMPLICATIONS    BLEEDIN. Leave the bandage in place.  2. Use tightly rolled up gauze or a cloth to apply direct pressure over the bandage for 30  minutes.  3. Reapply pressure for an additional 30 minutes if necessary  4. Use additional gauze and tape to maintain pressure once the bleeding has stopped.            Follow-ups after your visit        Who to contact     If you have questions or need follow up information about today's clinic visit or your schedule please contact [facility] directly at [phone].  Normal or non-critical lab and imaging results will be communicated to you by Style Jukeboxhart, letter or phone within 4 business days after the clinic has received the results. If you do not hear from us within 7 days, please contact the clinic through Style Jukeboxhart or phone. If you have a critical or abnormal lab result, we will notify you by phone as soon as possible.  Submit refill requests through FileThis  or call your pharmacy and they will forward the refill request to us. Please allow 3 business days for your refill to be completed.          Additional Information About Your Visit        MyChart Information     e-SENS gives you secure access to your electronic health record. If you see a primary care provider, you can also send messages to your care team and make appointments. If you have questions, please call your primary care clinic.  If you do not have a primary care provider, please call [phone] and they will assist you.        Care EveryWhere ID     This is your Care EveryWhere ID. This could be used by other organizations to access your [employer] medical records  [id number]        Your Vitals Were     Pulse Last Period Pulse Oximetry             102 (LMP Unknown) 98%          Blood Pressure from Last 3 Encounters:   07/11/17 (!) 139/91   06/27/17 130/82   06/04/17 122/80    Weight from Last 3 Encounters:   06/27/17 74.8 kg (165 lb)   06/04/17 75.4 kg (166 lb 3.2 oz)   05/11/17 74.8 kg (165 lb)              We Performed the Following     BIOPSY SKIN/SUBQ/MUC MEM, SINGLE LESION     Surgical pathology exam        Primary Care Provider Office Phone # Fax #    Jinny Raya PA-C [phone]       [facility] [address]   [facility] [address]        Equal Access to Services     SARAH DAO : Hadii aad ku hadasho Soomaali, waaxda luqadaha, qaybta kaalmada adeegyada, nikolas lyman. So [facility] [phone].    ATENCIÓN: Si habla español, tiene a maciel disposición servicios gratuitos de asistencia lingüística. Gem graff [phone].    We comply with applicable federal civil rights laws and [place] laws. We do not discriminate on the basis of race, color, national origin, age, disability sex, sexual orientation or gender identity.            Thank you!     Thank you for choosing [facility]  for your care.  Our goal is always to provide you with excellent care. Hearing back from our patients is one way we can continue to improve our services. Please take a few minutes to complete the written survey that you may receive in the mail after your visit with us. Thank you!             Your Updated Medication List - Protect others around you: Learn how to safely use, store and throw away your medicines at www.disposemymeds.org.          This list is accurate as of: 7/11/17  8:45 AM.  Always use your most recent med list.                   Brand Name Dispense Instructions for use Diagnosis    calcium 600 + D 600-400 MG-UNIT per tablet   Generic drug:  calcium-vitamin D      Take 1 tablet by mouth daily        Fish Oil 1200 MG Caps      Take 1 capsule by mouth        * VITAMIN C PO           * VITAMIN C PO           VITAMIN D (CHOLECALCIFEROL) PO      Take 1,000 Units by mouth daily        * Notice:  This list has 2 medication(s) that are the same as other medications prescribed for you. Read the directions carefully, and ask your doctor or other care provider to review them with you.

## 2017-07-13 LAB — COPATH REPORT: NORMAL

## 2017-07-14 ENCOUNTER — HOSPITAL PATHOLOGY (OUTPATIENT)
Dept: OTHER | Facility: CLINIC | Age: 57
End: 2017-07-14

## 2017-07-17 LAB — COPATH REPORT: NORMAL

## 2017-09-27 DIAGNOSIS — R73.01 ABNORMAL FASTING GLUCOSE: ICD-10-CM

## 2017-09-27 LAB — HBA1C MFR BLD: 6.5 % (ref 4.3–6)

## 2017-09-27 PROCEDURE — 83036 HEMOGLOBIN GLYCOSYLATED A1C: CPT | Performed by: PHYSICIAN ASSISTANT

## 2017-09-27 PROCEDURE — 36415 COLL VENOUS BLD VENIPUNCTURE: CPT | Performed by: PHYSICIAN ASSISTANT

## 2018-03-22 ENCOUNTER — OFFICE VISIT (OUTPATIENT)
Dept: FAMILY MEDICINE | Facility: CLINIC | Age: 58
End: 2018-03-22
Payer: COMMERCIAL

## 2018-03-22 VITALS
TEMPERATURE: 98.1 F | OXYGEN SATURATION: 99 % | HEART RATE: 124 BPM | DIASTOLIC BLOOD PRESSURE: 76 MMHG | RESPIRATION RATE: 16 BRPM | SYSTOLIC BLOOD PRESSURE: 116 MMHG | WEIGHT: 159 LBS | BODY MASS INDEX: 30.04 KG/M2

## 2018-03-22 DIAGNOSIS — R30.0 DYSURIA: Primary | ICD-10-CM

## 2018-03-22 DIAGNOSIS — K59.00 CONSTIPATION, UNSPECIFIED CONSTIPATION TYPE: ICD-10-CM

## 2018-03-22 DIAGNOSIS — E11.9 TYPE 2 DIABETES MELLITUS WITHOUT COMPLICATION, WITHOUT LONG-TERM CURRENT USE OF INSULIN (H): ICD-10-CM

## 2018-03-22 LAB
ALBUMIN UR-MCNC: 100 MG/DL
APPEARANCE UR: CLEAR
BACTERIA #/AREA URNS HPF: ABNORMAL /HPF
BILIRUB UR QL STRIP: NEGATIVE
COLOR UR AUTO: YELLOW
CREAT UR-MCNC: 262 MG/DL
GLUCOSE UR STRIP-MCNC: 100 MG/DL
HGB UR QL STRIP: ABNORMAL
KETONES UR STRIP-MCNC: ABNORMAL MG/DL
LEUKOCYTE ESTERASE UR QL STRIP: NEGATIVE
MICROALBUMIN UR-MCNC: 634 MG/L
MICROALBUMIN/CREAT UR: 241.98 MG/G CR (ref 0–25)
NITRATE UR QL: NEGATIVE
NON-SQ EPI CELLS #/AREA URNS LPF: ABNORMAL /LPF
PH UR STRIP: 5 PH (ref 5–7)
RBC #/AREA URNS AUTO: ABNORMAL /HPF
SOURCE: ABNORMAL
SP GR UR STRIP: >1.03 (ref 1–1.03)
UROBILINOGEN UR STRIP-ACNC: 0.2 EU/DL (ref 0.2–1)
WBC #/AREA URNS AUTO: ABNORMAL /HPF

## 2018-03-22 PROCEDURE — 81001 URINALYSIS AUTO W/SCOPE: CPT | Performed by: PHYSICIAN ASSISTANT

## 2018-03-22 PROCEDURE — 99214 OFFICE O/P EST MOD 30 MIN: CPT | Performed by: PHYSICIAN ASSISTANT

## 2018-03-22 PROCEDURE — 82043 UR ALBUMIN QUANTITATIVE: CPT | Performed by: PHYSICIAN ASSISTANT

## 2018-03-22 RX ORDER — CIPROFLOXACIN 250 MG/1
250 TABLET, FILM COATED ORAL 2 TIMES DAILY
Qty: 14 TABLET | Refills: 0 | Status: SHIPPED | OUTPATIENT
Start: 2018-03-22 | End: 2018-03-29

## 2018-03-22 RX ORDER — POLYETHYLENE GLYCOL 3350 17 G/17G
1 POWDER, FOR SOLUTION ORAL 2 TIMES DAILY
Qty: 510 G | Refills: 1 | Status: SHIPPED | OUTPATIENT
Start: 2018-03-22 | End: 2020-11-19

## 2018-03-22 NOTE — PROGRESS NOTES
SUBJECTIVE:   Martha Neal is a 57 year old female who presents to clinic today for the following health issues:    Constipation     Onset: monday    Description:   Frequency of bowel movements: every day.  Stool consistency: soft, hard, small caliber    Progression of Symptoms:  same    Accompanying Signs & Symptoms:  Abdominal pain (cramping?): YES  Blood in stool: no   Rectal pain: yes   Nausea/vomiting: no   Weight loss or gain: no    History:   History of abdominal surgery: YES- hysterectomy 15 years    Precipitating factors:   Recent use of narcotics, anticholinergics, calcium channel blockers, antacids, or iron supplements: no   Chronic Laxative Use: no          Therapies Tried and outcome: ducolax-some help    URINARY TRACT SYMPTOMS  Onset: 1-2 days    Description:   Painful urination (Dysuria): YES- burning, increased frequency.  Blood in urine (Hematuria): no   Delay in urine (Hesitency): no     Intensity: moderate    Progression of Symptoms:  worsening    Accompanying Signs & Symptoms:  Fever/chills: YES- chills  Flank pain YES- LBP  Nausea and vomiting: no   Any vaginal symptoms: none  Abdominal/Pelvic Pain: YES- maybe from constipation    History:   History of frequent UTI's: no   History of kidney stones: no   Sexually Active: no   Possibility of pregnancy: No    Precipitating factors:   n/a    Therapies Tried and outcome: increasing fluid intake  Reviewed and updated as needed this visit by clinical staff  Tobacco  Allergies  Meds  Problems  Med Hx  Surg Hx  Fam Hx  Soc Hx        Reviewed and updated as needed this visit by Provider  Tobacco  Allergies  Meds  Problems  Med Hx  Surg Hx  Fam Hx  Soc Hx        Additional complaints: None    HPI additional notes: Martha presents today with   Chief Complaint   Patient presents with     Constipation     UTI   Took dulcolax 5 times since Monday with some improvement.      Lab Results   Component Value Date    CR 0.74 02/28/2016             ROS:  C: Negative for fever, chills, recent change in weight  CV: Negative for chest pain or peripheral edema  GI:POSITIVE for abdominal pain RLQ and LLQ and constipation and NEGATIVE for diarrhea, hematochezia, hemorrhoids, jaundice, melena, nausea and vomiting  : POSITIVE for frequency, urgency, and dysuria.  P: Negative for changes in mood or affect  ROS otherwise negative.    Chart Review:  History   Smoking Status     Current Every Day Smoker     Packs/day: 1.00     Years: 45.00     Types: Cigarettes   Smokeless Tobacco     Never Used     Recent Labs   Lab Test  09/27/17   0853  06/27/17   0955  02/28/16   1811  12/16/15   0907  12/10/15   1000   A1C  6.5*  6.7*   --   6.3*   --    LDL   --   100*   --    --   134*   HDL   --   71   --    --   67   TRIG   --   152*   --    --   108   CR   --    --   0.74   --    --    GFRESTIMATED   --    --   80   --    --    GFRESTBLACK   --    --   >90   GFR Calc     --    --    POTASSIUM   --    --   3.9   --    --       BP Readings from Last 3 Encounters:   03/22/18 116/76   07/11/17 (!) 139/91   06/27/17 130/82    Wt Readings from Last 3 Encounters:   03/22/18 159 lb (72.1 kg)   06/27/17 165 lb (74.8 kg)   06/04/17 166 lb 3.2 oz (75.4 kg)                  Patient Active Problem List   Diagnosis     BMI 30-35     Tobacco abuse     Primary osteoarthritis of left ankle     Right-sided low back pain without sciatica, unspecified chronicity     Type 2 diabetes mellitus without complication, without long-term current use of insulin (H)     Past Surgical History:   Procedure Laterality Date     HYSTERECTOMY  2002     HYSTERECTOMY, PAP NO LONGER INDICATED       Problem list, Medication list, Allergies, Medical/Social/Surg hx reviewed in [employer], updated as appropriate.   OBJECTIVE:                                                    /76 (BP Location: Left arm, Patient Position: Chair, Cuff Size: Adult Regular)  Pulse 124  Temp 98.1  F (36.7  C)  (Tympanic)  Resp 16  Wt 159 lb (72.1 kg)  LMP  (LMP Unknown)  SpO2 99%  BMI 30.04 kg/m2  Body mass index is 30.04 kg/(m^2).  GENERAL: healthy, alert, in no acute distress  HENT: Mucous mebranes moist.  RESP: lungs clear to auscultation - no rales, no rhonchi, no wheezes  CV: regular rate and rhythm, normal S1 S2.  No peripheral edema.  ABDOMEN: obese, soft. Tender to deep palpation of LLQ. No hepatosplenomegaly or masses. Normal bowel sounds in all four quadrants. Delarosa's negative, Rovsing's negative. No rebound.  MS: no gross deformities noted.  No CVA tenderness.  SKIN: no suspicious lesions, no rashes  PSYCH: Alert and oriented times 3;  Able to articulate logical thoughts. Affect is normal.    Diagnostic test results:   Results for orders placed or performed in visit on 03/22/18 (from the past 24 hour(s))   UA reflex to Microscopic and Culture   Result Value Ref Range    Color Urine Yellow     Appearance Urine Clear     Glucose Urine 100 (A) NEG^Negative mg/dL    Bilirubin Urine Negative NEG^Negative    Ketones Urine Trace (A) NEG^Negative mg/dL    Specific Gravity Urine >1.030 1.003 - 1.035    Blood Urine Moderate (A) NEG^Negative    pH Urine 5.0 5.0 - 7.0 pH    Protein Albumin Urine 100 (A) NEG^Negative mg/dL    Urobilinogen Urine 0.2 0.2 - 1.0 EU/dL    Nitrite Urine Negative NEG^Negative    Leukocyte Esterase Urine Negative NEG^Negative    Source Midstream Urine    Urine Microscopic   Result Value Ref Range    WBC Urine 0 - 5 OTO5^0 - 5 /HPF    RBC Urine 5-10 (A) OTO2^O - 2 /HPF    Squamous Epithelial /LPF Urine Moderate (A) FEW^Few /LPF    Bacteria Urine Few (A) NEG^Negative /HPF        ASSESSMENT/PLAN:                                                          ICD-10-CM    1. Dysuria R30.0 UA reflex to Microscopic and Culture     Urine Microscopic     ciprofloxacin (CIPRO) 250 MG tablet   2. Type 2 diabetes mellitus without complication, without long-term current use of insulin (H) E11.9 Albumin Random  Urine Quantitative with Creat Ratio   3. Constipation, unspecified constipation type K59.00 polyethylene glycol (MIRALAX) powder     Pt just starting to have symptoms of UTI, based on history of micro results, will start her on cipro to see if that improves her symptoms.    Discussed constipation.  Will start on miralax bid until bowel movements become larger and more regular.  Will then decrease to once daily and then stop.  Will also get metamucil and start taking once daily.  Push fluids.  Discussed signs of diverticulitis.  If her pain becomes more severe in the LLQ or she starts having nausea or fever, will call the clinic.  As pain seems to be improving and she is not very tender on exam today, I think this is less likely.    Please see patient instructions for treatment details.    Follow up in 7-10 days if not improving as anticipated.    Jinny Raya PA-C  [facility]

## 2018-03-22 NOTE — NURSING NOTE
"Chief Complaint   Patient presents with     Constipation     UTI       Initial /76 (BP Location: Left arm, Patient Position: Chair, Cuff Size: Adult Regular)  Pulse 124  Temp 98.1  F (36.7  C) (Tympanic)  Resp 16  Wt 159 lb (72.1 kg)  LMP  (LMP Unknown)  SpO2 99%  BMI 30.04 kg/m2 Estimated body mass index is 30.04 kg/(m^2) as calculated from the following:    Height as of 6/27/17: 5' 1\" (1.549 m).    Weight as of this encounter: 159 lb (72.1 kg).  Medication Reconciliation: complete    "

## 2018-03-22 NOTE — MR AVS SNAPSHOT
After Visit Summary   3/22/2018    Martha Neal    MRN: [id number]           Patient Information     Date Of Birth          1960        Visit Information        Provider Department      3/22/2018 10:10 AM Jinny Raya PA-C [facility]        Today's Diagnoses     Dysuria    -  1    Type 2 diabetes mellitus without complication, without long-term current use of insulin (H)        Constipation, unspecified constipation type           Follow-ups after your visit        Who to contact     If you have questions or need follow up information about today's clinic visit or your schedule please contact [facility] directly at [phone].  Normal or non-critical lab and imaging results will be communicated to you by Re2youhart, letter or phone within 4 business days after the clinic has received the results. If you do not hear from us within 7 days, please contact the clinic through Re2youhart or phone. If you have a critical or abnormal lab result, we will notify you by phone as soon as possible.  Submit refill requests through Blogvio or call your pharmacy and they will forward the refill request to us. Please allow 3 business days for your refill to be completed.          Additional Information About Your Visit        MyChart Information     Blogvio gives you secure access to your electronic health record. If you see a primary care provider, you can also send messages to your care team and make appointments. If you have questions, please call your primary care clinic.  If you do not have a primary care provider, please call [phone] and they will assist you.        Care EveryWhere ID     This is your Care EveryWhere ID. This could be used by other organizations to access your [employer] medical records  [id number]        Your Vitals Were     Pulse Temperature Respirations Last Period Pulse Oximetry BMI (Body Mass Index)    124  98.1  F (36.7  C) (Tympanic) 16 (LMP Unknown) 99% 30.04 kg/m2       Blood Pressure from Last 3 Encounters:   03/22/18 116/76   07/11/17 (!) 139/91   06/27/17 130/82    Weight from Last 3 Encounters:   03/22/18 159 lb (72.1 kg)   06/27/17 165 lb (74.8 kg)   06/04/17 166 lb 3.2 oz (75.4 kg)              We Performed the Following     Albumin Random Urine Quantitative with Creat Ratio     UA reflex to Microscopic and Culture     Urine Microscopic          Today's Medication Changes          These changes are accurate as of 3/22/18 10:33 AM.  If you have any questions, ask your nurse or doctor.               Start taking these medicines.        Dose/Directions    ciprofloxacin 250 MG tablet   Commonly known as:  CIPRO   Used for:  Dysuria   Started by:  Jinny Raya PA-C        Dose:  250 mg   Take 1 tablet (250 mg) by mouth 2 times daily for 7 days   Quantity:  14 tablet   Refills:  0       polyethylene glycol powder   Commonly known as:  MIRALAX   Used for:  Constipation, unspecified constipation type   Started by:  Jinny Raya PA-C        Dose:  1 capful   Take 17 g (1 capful) by mouth 2 times daily   Quantity:  510 g   Refills:  1         These medicines have changed or have updated prescriptions.        Dose/Directions    VITAMIN C PO   This may have changed:  Another medication with the same name was removed. Continue taking this medication, and follow the directions you see here.   Changed by:  Jinny Raya PA-C        Refills:  0            Where to get your medicines      These medications were sent to [facility] [address] [facility] [place] [id number]  [address] [facility] [address]     Phone:  [phone]     ciprofloxacin 250 MG tablet    polyethylene glycol powder                Primary Care Provider Office Phone # Fax #    Jinny Raya PA-C [phone] [address] [facility] [address]  [facility] [address]         Equal Access to Services     [facility]IGUELIT[facility] : Hadii aad ku hadnelyaraceli Pool, wasalda lugladyssivanha, qatacota daltonchaonikolas gillespie. So [facility] [phone].    ATENCIÓN: Si habla español, tiene a maciel disposición servicios gratuitos de asistencia lingüística. Sukhame al [phone].    We comply with applicable federal civil rights laws and [place] laws. We do not discriminate on the basis of race, color, national origin, age, disability, sex, sexual orientation, or gender identity.            Thank you!     Thank you for choosing [facility]  for your care. Our goal is always to provide you with excellent care. Hearing back from our patients is one way we can continue to improve our services. Please take a few minutes to complete the written survey that you may receive in the mail after your visit with us. Thank you!             Your Updated Medication List - Protect others around you: Learn how to safely use, store and throw away your medicines at www.disposemymeds.org.          This list is accurate as of 3/22/18 10:33 AM.  Always use your most recent med list.                   Brand Name Dispense Instructions for use Diagnosis    calcium 600 + D 600-400 MG-UNIT per tablet   Generic drug:  calcium-vitamin D      Take 1 tablet by mouth daily        ciprofloxacin 250 MG tablet    CIPRO    14 tablet    Take 1 tablet (250 mg) by mouth 2 times daily for 7 days    Dysuria       fish Oil 1200 MG capsule      Take 1 capsule by mouth        polyethylene glycol powder    MIRALAX    510 g    Take 17 g (1 capful) by mouth 2 times daily    Constipation, unspecified constipation type       VITAMIN C PO           VITAMIN D (CHOLECALCIFEROL) PO      Take 1,000 Units by mouth daily

## 2018-03-22 NOTE — PROGRESS NOTES
Yves Thomas,    I just wanted to let you know that your lab results have been reviewed and are attached.    You had some protein in your urine but this is likely due to your UTI.    Please let me know if you have any questions and have a great week!    Sincerely,    Celina Raya PA-C    [facility]  [address]  [place], [place] [address] [phone] (p)

## 2018-08-15 ENCOUNTER — TELEPHONE (OUTPATIENT)
Dept: FAMILY MEDICINE | Facility: CLINIC | Age: 58
End: 2018-08-15

## 2018-08-15 PROBLEM — E78.2 MIXED HYPERLIPIDEMIA: Status: ACTIVE | Noted: 2018-08-15

## 2018-08-15 NOTE — LETTER
August 15, 2018    Martha Neal  [address]  [place] [address]    Dear Chris Thomas cares about your health and your health plan.  I have reviewed your medical conditions, medication list and lab results, and am making recommendations based on this review to better manage your health.    You are in particular need of attention regarding:  -Wellness (Physical) Visit     I am recommending that you:     -schedule a WELLNESS (Physical) APPOINTMENT with me.   I will check fasting labs the same day - nothing to eat except water and meds for 8-10 hours prior. (If you go elsewhere for Wellness visits then please disregard this reminder.)      Please call us at the [facility] location:  [phone] or use [employer] to address the above recommendations.     Thank you for trusting [facility].  We appreciate the opportunity to serve you and look forward to supporting your healthcare in the future.    If you have (or plan to have) any of these tests done at a facility other than a [facility] or a [facility], please have the results sent to the [facility] location noted above.      Best Regards,    JARED Carbajal

## 2018-08-15 NOTE — TELEPHONE ENCOUNTER
Panel Management Review      Patient has the following on her problem list:     Diabetes    ASA: failed    Last A1C  Lab Results   Component Value Date    A1C 6.5 09/27/2017    A1C 6.7 06/27/2017    A1C 6.3 12/16/2015     A1C tested: no goal listed    Last LDL:    Lab Results   Component Value Date    CHOL 201 06/27/2017     Lab Results   Component Value Date    HDL 71 06/27/2017     Lab Results   Component Value Date     06/27/2017     Lab Results   Component Value Date    TRIG 152 06/27/2017     No results found for: CHOLHDLRATIO  Lab Results   Component Value Date    NHDL 130 06/27/2017       Is the patient on a Statin? NO             Is the patient on Aspirin? NO        Last three blood pressure readings:  BP Readings from Last 3 Encounters:   03/22/18 116/76   07/11/17 (!) 139/91   06/27/17 130/82       Date of last diabetes office visit: 6/27/17     Tobacco History:     History   Smoking Status     Current Every Day Smoker     Packs/day: 1.00     Years: 45.00     Types: Cigarettes   Smokeless Tobacco     Never Used           Composite cancer screening  Chart review shows that this patient is due/due soon for the following None  Summary:    Patient is due/failing the following:   lipid, A1C and PHYSICAL    Action needed:   Patient needs office visit for physical.    Type of outreach:    Sent letter.    Questions for provider review:    None

## 2018-08-16 ENCOUNTER — OFFICE VISIT (OUTPATIENT)
Dept: FAMILY MEDICINE | Facility: CLINIC | Age: 58
End: 2018-08-16
Payer: COMMERCIAL

## 2018-08-16 VITALS
SYSTOLIC BLOOD PRESSURE: 130 MMHG | DIASTOLIC BLOOD PRESSURE: 86 MMHG | TEMPERATURE: 97.7 F | HEIGHT: 61 IN | RESPIRATION RATE: 14 BRPM | OXYGEN SATURATION: 96 % | WEIGHT: 156 LBS | HEART RATE: 94 BPM | BODY MASS INDEX: 29.45 KG/M2

## 2018-08-16 DIAGNOSIS — E11.9 TYPE 2 DIABETES MELLITUS WITHOUT COMPLICATION, WITHOUT LONG-TERM CURRENT USE OF INSULIN (H): ICD-10-CM

## 2018-08-16 DIAGNOSIS — E78.2 MIXED HYPERLIPIDEMIA: ICD-10-CM

## 2018-08-16 DIAGNOSIS — Z12.31 VISIT FOR SCREENING MAMMOGRAM: ICD-10-CM

## 2018-08-16 DIAGNOSIS — R00.2 PALPITATION: ICD-10-CM

## 2018-08-16 DIAGNOSIS — Z12.11 SCREENING FOR COLON CANCER: ICD-10-CM

## 2018-08-16 DIAGNOSIS — Z00.00 ROUTINE HISTORY AND PHYSICAL EXAMINATION OF ADULT: Primary | ICD-10-CM

## 2018-08-16 DIAGNOSIS — F17.200 TOBACCO USE DISORDER: ICD-10-CM

## 2018-08-16 DIAGNOSIS — Z84.81 FAMILY HISTORY OF BRCA GENE POSITIVE: ICD-10-CM

## 2018-08-16 LAB
ANION GAP SERPL CALCULATED.3IONS-SCNC: 9 MMOL/L (ref 3–14)
BUN SERPL-MCNC: 17 MG/DL (ref 7–30)
CALCIUM SERPL-MCNC: 9.2 MG/DL (ref 8.5–10.1)
CHLORIDE SERPL-SCNC: 107 MMOL/L (ref 94–109)
CHOLEST SERPL-MCNC: 192 MG/DL
CO2 SERPL-SCNC: 27 MMOL/L (ref 20–32)
CREAT SERPL-MCNC: 0.88 MG/DL (ref 0.52–1.04)
CREAT UR-MCNC: 74 MG/DL
GFR SERPL CREATININE-BSD FRML MDRD: 65 ML/MIN/1.7M2
GLUCOSE SERPL-MCNC: 126 MG/DL (ref 70–99)
HBA1C MFR BLD: 6.5 % (ref 0–5.6)
HDLC SERPL-MCNC: 64 MG/DL
LDLC SERPL CALC-MCNC: 101 MG/DL
MICROALBUMIN UR-MCNC: 6 MG/L
MICROALBUMIN/CREAT UR: 7.92 MG/G CR (ref 0–25)
NONHDLC SERPL-MCNC: 128 MG/DL
POTASSIUM SERPL-SCNC: 3.9 MMOL/L (ref 3.4–5.3)
SODIUM SERPL-SCNC: 143 MMOL/L (ref 133–144)
TRIGL SERPL-MCNC: 135 MG/DL
TSH SERPL DL<=0.005 MIU/L-ACNC: 2.78 MU/L (ref 0.4–4)

## 2018-08-16 PROCEDURE — 99207 C FOOT EXAM  NO CHARGE: CPT | Mod: 25 | Performed by: PHYSICIAN ASSISTANT

## 2018-08-16 PROCEDURE — 99213 OFFICE O/P EST LOW 20 MIN: CPT | Mod: 25 | Performed by: PHYSICIAN ASSISTANT

## 2018-08-16 PROCEDURE — 82043 UR ALBUMIN QUANTITATIVE: CPT | Performed by: PHYSICIAN ASSISTANT

## 2018-08-16 PROCEDURE — 80061 LIPID PANEL: CPT | Performed by: PHYSICIAN ASSISTANT

## 2018-08-16 PROCEDURE — 80048 BASIC METABOLIC PNL TOTAL CA: CPT | Performed by: PHYSICIAN ASSISTANT

## 2018-08-16 PROCEDURE — 84443 ASSAY THYROID STIM HORMONE: CPT | Performed by: PHYSICIAN ASSISTANT

## 2018-08-16 PROCEDURE — 36415 COLL VENOUS BLD VENIPUNCTURE: CPT | Performed by: PHYSICIAN ASSISTANT

## 2018-08-16 PROCEDURE — 99396 PREV VISIT EST AGE 40-64: CPT | Performed by: PHYSICIAN ASSISTANT

## 2018-08-16 PROCEDURE — 83036 HEMOGLOBIN GLYCOSYLATED A1C: CPT | Performed by: PHYSICIAN ASSISTANT

## 2018-08-16 PROCEDURE — 87389 HIV-1 AG W/HIV-1&-2 AB AG IA: CPT | Performed by: PHYSICIAN ASSISTANT

## 2018-08-16 RX ORDER — LISINOPRIL 5 MG/1
5 TABLET ORAL DAILY
Qty: 90 TABLET | Refills: 1 | Status: CANCELLED | OUTPATIENT
Start: 2018-08-16

## 2018-08-16 RX ORDER — ATORVASTATIN CALCIUM 20 MG/1
20 TABLET, FILM COATED ORAL DAILY
Qty: 90 TABLET | Refills: 1 | Status: CANCELLED | OUTPATIENT
Start: 2018-08-16

## 2018-08-16 NOTE — PROGRESS NOTES
SUBJECTIVE:   CC: Martha Neal is an 58 year old woman who presents for preventive health visit.     Physical   Annual:     Getting at least 3 servings of Calcium per day:  Yes    Bi-annual eye exam:  NO    Dental care twice a year:  Yes    Sleep apnea or symptoms of sleep apnea:  None    Diet:  Regular (no restrictions)    Frequency of exercise:  6-7 days/week    Duration of exercise:  30-45 minutes    Taking medications regularly:  Yes    Medication side effects:  Not applicable    Additional concerns today:  No    Chest Pain      Onset: Not pain, palpitations     Description (location/character/radiation/duration): extra beats and then skipping beats for a few minutes, usually in the afternoon while resting.  Usually has some caffeine with lunch.  No chest pain or SOB when occurs    Intensity:  mild    Accompanying signs and symptoms:        Shortness of breath: no        Sweating: no        Nausea/vomitting: no        Palpitations: YES       Other (fevers/chills/cough/heartburn/lightheadedness): no     History (similar episodes/previous evaluation): None    Precipitating or alleviating factors:       Worse with exertion: no        Worse with breathing: no        Related to eating: no        Better with burping: no     Therapies tried and outcome: None      Today's PHQ-2 Score:   PHQ-2 ( 1999 [employer]) 8/16/2018   Q1: Little interest or pleasure in doing things 0   Q2: Feeling down, depressed or hopeless 0   PHQ-2 Score 0   Q1: Little interest or pleasure in doing things Not at all   Q2: Feeling down, depressed or hopeless Not at all   PHQ-2 Score 0       Abuse: Current or Past(Physical, Sexual or Emotional)- No  Do you feel safe in your environment - Yes    Social History   Substance Use Topics     Smoking status: Current Every Day Smoker     Packs/day: 0.25     Years: 45.00     Types: Cigarettes     Smokeless tobacco: Never Used     Alcohol use No     Alcohol Use 8/16/2018   If you drink alcohol do you  typically have greater than 3 drinks per day OR greater than 7 drinks per week? Not Applicable   No flowsheet data found.    Reviewed orders with patient.  Reviewed health maintenance and updated orders accordingly - Yes  BP Readings from Last 3 Encounters:   08/16/18 130/86   03/22/18 116/76   07/11/17 (!) 139/91    Wt Readings from Last 3 Encounters:   08/16/18 156 lb (70.8 kg)   03/22/18 159 lb (72.1 kg)   06/27/17 165 lb (74.8 kg)                  Recent Labs   Lab Test  09/27/17   0853  06/27/17   0955  02/28/16   1811  12/16/15   0907  12/10/15   1000   A1C  6.5*  6.7*   --   6.3*   --    LDL   --   100*   --    --   134*   HDL   --   71   --    --   67   TRIG   --   152*   --    --   108   CR   --    --   0.74   --    --    GFRESTIMATED   --    --   80   --    --    GFRESTBLACK   --    --   >90   GFR Calc     --    --    POTASSIUM   --    --   3.9   --    --         Patient over age 50, mutual decision to screen reflected in health maintenance.    Pertinent mammograms are reviewed under the imaging tab.  History of abnormal Pap smear: Status post benign hysterectomy. Health Maintenance and Surgical History updated.     Reviewed and updated as needed this visit by clinical staff  Tobacco  Allergies  Meds  Problems  Med Hx  Surg Hx  Fam Hx  Soc Hx          Reviewed and updated as needed this visit by Provider  Tobacco  Allergies  Meds  Problems  Med Hx  Surg Hx  Fam Hx  Soc Hx           Review of Systems  CONSTITUTIONAL: NEGATIVE for fever, chills, change in weight  INTEGUMENTARY/SKIN: NEGATIVE for worrisome rashes, moles or lesions  EYES: NEGATIVE for vision changes or irritation  ENT: NEGATIVE for ear, mouth and throat problems  RESP: NEGATIVE for significant cough or SOB  BREAST: NEGATIVE for masses, tenderness or discharge  CV: POSITIVE for irregular heart beat and palpitations and NEGATIVE for chest pain/chest pressure, dyspnea on exertion, lower extremity edema, orthopnea and  "syncope or near-syncope  GI: NEGATIVE for nausea, abdominal pain, heartburn, or change in bowel habits  : NEGATIVE for unusual urinary or vaginal symptoms. No vaginal bleeding.  MUSCULOSKELETAL: NEGATIVE for significant arthralgias or myalgia  NEURO: NEGATIVE for weakness, dizziness or paresthesias  PSYCHIATRIC: NEGATIVE for changes in mood or affect      OBJECTIVE:   /86  Pulse 94  Temp 97.7  F (36.5  C) (Tympanic)  Resp 14  Ht 5' 1.25\" (1.556 m)  Wt 156 lb (70.8 kg)  LMP  (LMP Unknown)  SpO2 96%  BMI 29.24 kg/m2  Physical Exam  GENERAL APPEARANCE: healthy, alert and no distress  EYES: Eyes grossly normal to inspection, PERRL and conjunctivae and sclerae normal  HENT: ear canals and TM's normal, nose and mouth without ulcers or lesions, oropharynx clear and oral mucous membranes moist  NECK: no adenopathy, no asymmetry, masses, or scars and thyroid normal to palpation  RESP: lungs clear to auscultation - no rales, rhonchi or wheezes  BREAST: normal without masses, tenderness or nipple discharge and no palpable axillary masses or adenopathy  CV: regular rate and rhythm, normal S1 S2, no S3 or S4, no murmur, click or rub, no peripheral edema and peripheral pulses strong  ABDOMEN: soft, nontender, no hepatosplenomegaly, no masses and bowel sounds normal  MS: no musculoskeletal defects are noted and gait is age appropriate without ataxia  SKIN: no suspicious lesions or rashes  NEURO: Normal strength and tone, sensory exam grossly normal, mentation intact and speech normal  PSYCH: mentation appears normal and affect normal/bright  Foot Exam: normal DP and PT pulses, no trophic changes or ulcerative lesions and normal sensory exam    ASSESSMENT/PLAN:       ICD-10-CM    1. Routine history and physical examination of adult Z00.00 Lipid panel reflex to direct LDL Fasting     Basic metabolic panel     HIV Screening   2. Type 2 diabetes mellitus without complication, without long-term current use of insulin " "(H) E11.9 Hemoglobin A1c     TSH with free T4 reflex     FOOT EXAM     Albumin Random Urine Quantitative with Creat Ratio   3. Mixed hyperlipidemia E78.2    4. Screening for colon cancer Z12.11 Fecal colorectal cancer screen FIT   5. Visit for screening mammogram Z12.31 MA Screening Digital Bilateral   6. Tobacco use disorder F17.200 TOBACCO CESSATION ORDER FOR    7. Palpitation R00.2 Zio Patch 48 Hours     CARDIOLOGY PROCEDURE ADULT REFERRAL   Will get Zio Patch to monitor for palpitations, will do 48 hour patch.  Will call pt and let her know the results.  Will also check thyroid today.    Pt agrees to fit.  Will get mammos yearly as sister has BRCA gene.  Pt does not want to be tested.    COUNSELING:  Reviewed preventive health counseling, as reflected in patient instructions    BP Readings from Last 1 Encounters:   08/16/18 130/86     Estimated body mass index is 29.24 kg/(m^2) as calculated from the following:    Height as of this encounter: 5' 1.25\" (1.556 m).    Weight as of this encounter: 156 lb (70.8 kg).      Weight management plan: : -30 minutes of exercise 5 days a week (walking, jogging, housework, biking).  - Limit portion sizes and avoid eating out.  -Eat at least 5 servings of fruits and vegetables daily.   -Eat whole-grain bread, whole-wheat pasta and brown rice instead of white grains and rice.       reports that she has been smoking Cigarettes.  She has a 11.25 pack-year smoking history. She has never used smokeless tobacco.  Tobacco Cessation Action Plan: Self help information given to patient, working on quitting.    Counseling Resources:  ATP IV Guidelines  Pooled Cohorts Equation Calculator  Breast Cancer Risk Calculator  FRAX Risk Assessment  ICSI Preventive Guidelines  Dietary Guidelines for Americans, 2010  USDA's MyPlate  ASA Prophylaxis  Lung CA Screening    Jinny Raya PA-C  [facility]  "

## 2018-08-16 NOTE — MR AVS SNAPSHOT
After Visit Summary   8/16/2018    Martha Neal    MRN: [id number]           Patient Information     Date Of Birth          1960        Visit Information        Provider Department      8/16/2018 8:30 AM Jinny Raya PA-C [facility]        Today's Diagnoses     Routine history and physical examination of adult    -  1    Type 2 diabetes mellitus without complication, without long-term current use of insulin (H)        Mixed hyperlipidemia        Screening for colon cancer        Visit for screening mammogram        Tobacco use disorder        Palpitation        Family history of BRCA gene positive          Care Instructions      Preventive Health Recommendations  Female Ages 50 - 64    Yearly exam: See your health care provider every year in order to  o Review health changes.   o Discuss preventive care.    o Review your medicines if your doctor has prescribed any.      Get a Pap test every three years (unless you have an abnormal result and your provider advises testing more often).    If you get Pap tests with HPV test, you only need to test every 5 years, unless you have an abnormal result.     You do not need a Pap test if your uterus was removed (hysterectomy) and you have not had cancer.    You should be tested each year for STDs (sexually transmitted diseases) if you're at risk.     Have a mammogram every 1 to 2 years.    Have a colonoscopy at age 50, or have a yearly FIT test (stool test). These exams screen for colon cancer.      Have a cholesterol test every 5 years, or more often if advised.    Have a diabetes test (fasting glucose) every three years. If you are at risk for diabetes, you should have this test more often.     If you are at risk for osteoporosis (brittle bone disease), think about having a bone density scan (DEXA).    Shots: Get a flu shot each year. Get a tetanus shot every 10 years.    Nutrition:     Eat at least 5  servings of fruits and vegetables each day.    Eat whole-grain bread, whole-wheat pasta and brown rice instead of white grains and rice.    Get adequate Calcium and Vitamin D.     Lifestyle    Exercise at least 150 minutes a week (30 minutes a day, 5 days a week). This will help you control your weight and prevent disease.    Limit alcohol to one drink per day.    No smoking.     Wear sunscreen to prevent skin cancer.     See your dentist every six months for an exam and cleaning.    See your eye doctor every 1 to 2 years.            Follow-ups after your visit        Additional Services     CARDIOLOGY PROCEDURE ADULT REFERRAL       Your provider has referred you to:   [facility]: [facility]  [place] [phone]   [email or web address]    Cardiology procedures to be completed: Zio Patch 48 hours, reason for procedure daily palpitations     Please be aware that coverage of these services is subject to the terms and limitations of your health insurance plan.  Call member services at your health plan with any benefit or coverage questions.     Please bring the following to your appointment:  >>   Any x-rays, CTs or MRIs which have been performed.  Contact the facility where they were done to arrange for  prior to your scheduled appointment.   >>   List of current medications  >>   This referral request   >>   Any documents/labs given to you for this referral    [facility]   For scheduling questions call [phone]    [facility]  For scheduling questions call [phone]                  Future tests that were ordered for you today     Open Future Orders        Priority Expected Expires Ordered    Zio Patch 48 Hours Routine  9/30/2018 8/16/2018    Fecal colorectal cancer screen FIT Routine 9/5/2018 11/7/2018 8/16/2018    MA Screening Digital Bilateral Routine 9/14/2018 8/10/2019 8/16/2018            Who to contact      "If you have questions or need follow up information about today's clinic visit or your schedule please contact [facility] directly at [phone].  Normal or non-critical lab and imaging results will be communicated to you by MyChart, letter or phone within 4 business days after the clinic has received the results. If you do not hear from us within 7 days, please contact the clinic through [facility] or phone. If you have a critical or abnormal lab result, we will notify you by phone as soon as possible.  Submit refill requests through [employer] or call your pharmacy and they will forward the refill request to us. Please allow 3 business days for your refill to be completed.          Additional Information About Your Visit        [facility] Information     [employer] gives you secure access to your electronic health record. If you see a primary care provider, you can also send messages to your care team and make appointments. If you have questions, please call your primary care clinic.  If you do not have a primary care provider, please call [phone] and they will assist you.        Care EveryWhere ID     This is your Care EveryWhere ID. This could be used by other organizations to access your [facility] medical records  [id number]        Your Vitals Were     Pulse Temperature Respirations Height Last Period Pulse Oximetry    94 97.7  F (36.5  C) (Tympanic) 14 5' 1.25\" (1.556 m) (LMP Unknown) 96%    BMI (Body Mass Index)                   29.24 kg/m2            Blood Pressure from Last 3 Encounters:   08/16/18 130/86   03/22/18 116/76   07/11/17 (!) 139/91    Weight from Last 3 Encounters:   08/16/18 156 lb (70.8 kg)   03/22/18 159 lb (72.1 kg)   06/27/17 165 lb (74.8 kg)              We Performed the Following     Albumin Random Urine Quantitative with Creat Ratio     Basic metabolic panel     CARDIOLOGY PROCEDURE ADULT REFERRAL     FOOT EXAM     Hemoglobin A1c     HIV Screening     Lipid " panel reflex to direct LDL Fasting     TOBACCO CESSATION ORDER FOR      TSH with free T4 reflex        Primary Care Provider Office Phone # Fax #    Jinny Raya PA-C [phone] [address] [facility] [address]  [facility] [id number]        Equal Access to Services     SARAH DAO : Hadii aad ku hadasho Soomaali, waaxda luqadaha, qaybta kaalmada adeegyada, waxay alfreditoin hayaan adesoraya londonminijonathan lyman. So [facility] [phone].    ATENCIÓN: Si habla español, tiene a maciel disposición servicios gratuitos de asistencia lingüística. Llame al [phone].    We comply with applicable federal civil rights laws and [place] laws. We do not discriminate on the basis of race, color, national origin, age, disability, sex, sexual orientation, or gender identity.            Thank you!     Thank you for choosing [facility]  for your care. Our goal is always to provide you with excellent care. Hearing back from our patients is one way we can continue to improve our services. Please take a few minutes to complete the written survey that you may receive in the mail after your visit with us. Thank you!             Your Updated Medication List - Protect others around you: Learn how to safely use, store and throw away your medicines at www.disposemymeds.org.          This list is accurate as of 8/16/18  9:02 AM.  Always use your most recent med list.                   Brand Name Dispense Instructions for use Diagnosis    calcium 600 + D 600-400 MG-UNIT per tablet   Generic drug:  calcium-vitamin D      Take 1 tablet by mouth daily        fish Oil 1200 MG capsule      Take 1 capsule by mouth        polyethylene glycol powder    MIRALAX    510 g    Take 17 g (1 capful) by mouth 2 times daily    Constipation, unspecified constipation type       VITAMIN C PO           VITAMIN D (CHOLECALCIFEROL) PO      Take 1,000 Units by mouth daily

## 2018-08-17 LAB — HIV 1+2 AB+HIV1 P24 AG SERPL QL IA: NONREACTIVE

## 2018-08-17 PROCEDURE — 82274 ASSAY TEST FOR BLOOD FECAL: CPT | Performed by: PHYSICIAN ASSISTANT

## 2018-08-20 ENCOUNTER — HOSPITAL ENCOUNTER (OUTPATIENT)
Dept: CARDIOLOGY | Facility: CLINIC | Age: 58
Discharge: HOME | End: 2018-08-20
Attending: PHYSICIAN ASSISTANT | Admitting: PHYSICIAN ASSISTANT
Payer: COMMERCIAL

## 2018-08-20 DIAGNOSIS — R00.2 PALPITATION: ICD-10-CM

## 2018-08-20 PROCEDURE — 0296T ZIO PATCH HOLTER: CPT

## 2018-08-20 PROCEDURE — 0298T ZZC EXT ECG > 48HR TO 21 DAY REVIEW AND INTERPRETATN: CPT | Performed by: INTERNAL MEDICINE

## 2018-08-20 NOTE — PROGRESS NOTES
Yves Thomsa,    I just wanted to let you know that your lab results have been reviewed and are attached.    - Your A1c is stable at 6.5 so we don't need to start any medications.  Everything else is normal.    Please let me know if you have any questions and have a great week!    Sincerely,    Celina Raya PA-C    [facility]  [address]  [place] [address]  [phone] (p)

## 2018-08-21 DIAGNOSIS — Z12.11 SCREENING FOR COLON CANCER: ICD-10-CM

## 2018-08-21 LAB — HEMOCCULT STL QL IA: NEGATIVE

## 2018-08-21 NOTE — PROGRESS NOTES
Martha    Your lab tests are complete and I have reviewed the results.     - JARED Carbajal is currently out of the office.  -FIT test (screening test for colon cancer) was normal.  Recheck due in 1 year.    If you have any questions or concerns, please feel free to call or send a [employer] message.    Sincerely,  Zane Armenta PA-C

## 2018-08-31 ENCOUNTER — RADIANT APPOINTMENT (OUTPATIENT)
Dept: MAMMOGRAPHY | Facility: CLINIC | Age: 58
End: 2018-08-31
Attending: PHYSICIAN ASSISTANT
Payer: COMMERCIAL

## 2018-08-31 DIAGNOSIS — Z12.31 VISIT FOR SCREENING MAMMOGRAM: ICD-10-CM

## 2018-08-31 PROCEDURE — 77067 SCR MAMMO BI INCL CAD: CPT | Mod: TC

## 2018-09-04 NOTE — PROGRESS NOTES
Yves Thomas,    I just wanted to let you know that your lab results have been reviewed and are attached.    Your Zio patch showed that you had two runs of the ventricle of you heart lesley quickly, causing you heart rate to increase and you to feel palpitations.  To prevent these episodes, I would recommend we start you on a medication.  Please make a follow up appointment so we can discuss.    Please let me know if you have any questions and have a great week!    Sincerely,    Celina Raya PA-C    [facility]  [address]  [place] [address] [phone] (p)

## 2018-09-24 ENCOUNTER — OFFICE VISIT (OUTPATIENT)
Dept: FAMILY MEDICINE | Facility: CLINIC | Age: 58
End: 2018-09-24
Payer: COMMERCIAL

## 2018-09-24 VITALS
BODY MASS INDEX: 29.99 KG/M2 | SYSTOLIC BLOOD PRESSURE: 122 MMHG | WEIGHT: 160 LBS | TEMPERATURE: 97 F | RESPIRATION RATE: 14 BRPM | HEART RATE: 79 BPM | DIASTOLIC BLOOD PRESSURE: 80 MMHG | OXYGEN SATURATION: 98 %

## 2018-09-24 DIAGNOSIS — I47.10 PAROXYSMAL SUPRAVENTRICULAR TACHYCARDIA (H): Primary | ICD-10-CM

## 2018-09-24 PROCEDURE — 99213 OFFICE O/P EST LOW 20 MIN: CPT | Performed by: PHYSICIAN ASSISTANT

## 2018-09-24 NOTE — MR AVS SNAPSHOT
After Visit Summary   9/24/2018    Martha Neal    MRN: [id number]           Patient Information     Date Of Birth          1960        Visit Information        Provider Department      9/24/2018 9:30 AM Jinny Raya PA-C [facility]        Today's Diagnoses     Paroxysmal supraventricular tachycardia (H)    -  1    Need for prophylactic vaccination and inoculation against influenza        Need for prophylactic vaccination against Streptococcus pneumoniae (pneumococcus)          Care Instructions      Ventricular Arrhythmia  The heart has its own electrical system to regulate the heartbeat. An abnormal change in the rate or pattern of the heartbeat is called an arrhythmia. It can cause the heart to move blood less efficiently. Four chambers hold blood as it moves through the heart. The 2 lower chambers of the heart are called ventricles. Problems with the signals in the heart can make the ventricles beat faster than normal.    Ventricular tachycardia (VT)  Rapid electrical from the ventricles can result in a fast heart rhythm called ventricular tachycardia or VT.    With VT, abnormal electrical pathways or circuits form in the ventricles. This can be caused by any disease that damages the heart muscle. It's most commonly seen as a result of a heart attack or coronary artery disease.    Electrical signals enter the abnormal circuit and loop around. With each loop, the signal tells the ventricles to contract. This makes the heart beat very fast. The heart may be beating too fast to pump blood. This can cause you to pass out. It can also cause cardiac arrest, leading to sudden death.     In some cases, VT develops into ventricular fibrillation. This is the most serious type of arrhythmia as it is fatal if not promptly treated.    Ventricular fibrillation (VF)  At times, electrical signals may be sent so fast and unevenly that the heart muscle quivers  and doesn t beat at all. This is called fibrillation:    VF can occur if the ventricles contain several abnormal circuits, or if the heart muscle is acutely injured such as from a heart attack, and becomes electrically unstable.    Signals caught in the circuits make the ventricles beat very quickly and unevenly. This keeps the heart muscle from pumping properly.    The heart can get to the point that it can t pump at all. This is called cardiac arrest. Death will occur if emergency treatment isn t given to return the heart rhythm to normal.  Date Last Reviewed: 4/20/2016 2000-2017 [employer]. [address] [place] [id number]. All rights reserved. This information is not intended as a substitute for professional medical care. Always follow your healthcare professional's instructions.        Treatment for Supraventricular Tachycardia  Supraventricular tachycardia (SVT) is a type of abnormally fast heartbeat. The heart normally beats 60 to 100 beats per minute. With SVT, the heart beats more than 100 times a minute. It may beat as fast as 250 times a minute. This is caused by a problem in the electrical system of the heart. It can lessen the amount of blood pumped through the heart.  Types of treatment  You may not need treatment for SVT if you have only had one episode. If you do need treatment, there are several kinds. They include:    Valsalva maneuver. This is a way to increase pressure in the abdomen and chest. It can correct your heart rhythm right away. To do it, you bear down with your stomach muscles, as though you are trying to have a bowel movement.    Carotid massage. Your healthcare provider may gently rub the carotid artery in your neck. This can also help correct the heart rhythm right away.    Medicine. There are various kinds you can take. Calcium channel blockers or adenosine can help correct heart rhythm right away. If you have SVT only 1 or 2 times a year, you may take  beta-blockers or calcium channel medicine as needed. If your SVT is more frequent, you may need to take medicine every day. Some people may need to take several medicines to prevent episodes of SVT.    Electrocardioversion. This is a shock to the heart to restart a normal rhythm right away. This may be done if you have a severe episode of SVT.    Catheter ablation. This can help permanently stop SVT. Your healthcare provider puts a thin, flexible tube (catheter) into a blood vessel in the groin. He or she then gently pushes it up into your heart. The area of your heart that causes your SVT is then burned. This prevents that area from starting a signal that causes SVT.   Lifestyle changes to help prevent SVT episodes  Your healthcare provider might suggest other ways to help prevent SVT, such as:    Having less alcohol and caffeine    Not smoking    Lowering your stress    Eating foods that are healthy for your heart  When to call your healthcare provider  Call your healthcare provider if you have any of the following:    Sudden shortness of breath (call 911)    Severe palpitations    Severe dizziness    Severe chest pain    Symptoms that are happening more often   Date Last Reviewed: 8/10/2015    [id number] [employer]. [address] [place] [address]. All rights reserved. This information is not intended as a substitute for professional medical care. Always follow your healthcare professional's instructions.                Follow-ups after your visit        Who to contact     If you have questions or need follow up information about today's clinic visit or your schedule please contact [facility] directly at [phone].  Normal or non-critical lab and imaging results will be communicated to you by MyChart, letter or phone within 4 business days after the clinic has received the results. If you do not hear from us within 7 days, please contact the clinic through  [employer] or phone. If you have a critical or abnormal lab result, we will notify you by phone as soon as possible.  Submit refill requests through [employer] or call your pharmacy and they will forward the refill request to us. Please allow 3 business days for your refill to be completed.          Additional Information About Your Visit        [employer] Information     [employer] gives you secure access to your electronic health record. If you see a primary care provider, you can also send messages to your care team and make appointments. If you have questions, please call your primary care clinic.  If you do not have a primary care provider, please call [phone] and they will assist you.        Care EveryWhere ID     This is your Care EveryWhere ID. This could be used by other organizations to access your [facility] medical records  [id number]        Your Vitals Were     Pulse Temperature Respirations Last Period Pulse Oximetry BMI (Body Mass Index)    79 97  F (36.1  C) (Tympanic) 14 (LMP Unknown) 98% 29.99 kg/m2       Blood Pressure from Last 3 Encounters:   09/24/18 122/80   08/16/18 130/86   03/22/18 116/76    Weight from Last 3 Encounters:   09/24/18 160 lb (72.6 kg)   08/16/18 156 lb (70.8 kg)   03/22/18 159 lb (72.1 kg)              Today, you had the following     No orders found for display       Primary Care Provider Office Phone # Fax #    Jinny Raya PA-C [phone]       [address]   [facility] [address]        Equal Access to Services     SARAH DAO : Hadii aad ku hadasho Soomaali, waaxda luqadaha, qaybta kaalmada adeegyada, nikolas daily . So [facility] [phone].    ATENCIÓN: Si habla español, tiene a maciel disposición servicios gratuitos de asistencia lingüística. Llame al [phone].    We comply with applicable federal civil rights laws and [place] laws. We do not discriminate on the basis of race, color, national origin, age,  disability, sex, sexual orientation, or gender identity.            Thank you!     Thank you for choosing [facility]  for your care. Our goal is always to provide you with excellent care. Hearing back from our patients is one way we can continue to improve our services. Please take a few minutes to complete the written survey that you may receive in the mail after your visit with us. Thank you!             Your Updated Medication List - Protect others around you: Learn how to safely use, store and throw away your medicines at www.disposemymeds.org.          This list is accurate as of 9/24/18  9:41 AM.  Always use your most recent med list.                   Brand Name Dispense Instructions for use Diagnosis    calcium 600 + D 600-400 MG-UNIT per tablet   Generic drug:  calcium carbonate 600 mg-vitamin D 400 units      Take 1 tablet by mouth daily        fish Oil 1200 MG capsule      Take 1 capsule by mouth        polyethylene glycol powder    MIRALAX    510 g    Take 17 g (1 capful) by mouth 2 times daily    Constipation, unspecified constipation type       VITAMIN C PO           VITAMIN D (CHOLECALCIFEROL) PO      Take 1,000 Units by mouth daily

## 2018-09-24 NOTE — PATIENT INSTRUCTIONS
Ventricular Arrhythmia  The heart has its own electrical system to regulate the heartbeat. An abnormal change in the rate or pattern of the heartbeat is called an arrhythmia. It can cause the heart to move blood less efficiently. Four chambers hold blood as it moves through the heart. The 2 lower chambers of the heart are called ventricles. Problems with the signals in the heart can make the ventricles beat faster than normal.    Ventricular tachycardia (VT)  Rapid electrical from the ventricles can result in a fast heart rhythm called ventricular tachycardia or VT.    With VT, abnormal electrical pathways or circuits form in the ventricles. This can be caused by any disease that damages the heart muscle. It's most commonly seen as a result of a heart attack or coronary artery disease.    Electrical signals enter the abnormal circuit and loop around. With each loop, the signal tells the ventricles to contract. This makes the heart beat very fast. The heart may be beating too fast to pump blood. This can cause you to pass out. It can also cause cardiac arrest, leading to sudden death.     In some cases, VT develops into ventricular fibrillation. This is the most serious type of arrhythmia as it is fatal if not promptly treated.    Ventricular fibrillation (VF)  At times, electrical signals may be sent so fast and unevenly that the heart muscle quivers and doesn t beat at all. This is called fibrillation:    VF can occur if the ventricles contain several abnormal circuits, or if the heart muscle is acutely injured such as from a heart attack, and becomes electrically unstable.    Signals caught in the circuits make the ventricles beat very quickly and unevenly. This keeps the heart muscle from pumping properly.    The heart can get to the point that it can t pump at all. This is called cardiac arrest. Death will occur if emergency treatment isn t given to return the heart rhythm to normal.  Date Last Reviewed:  4/20/2016 2000-2017 [employer]. [address] [place] [address], [place] [address]. All rights reserved. This information is not intended as a substitute for professional medical care. Always follow your healthcare professional's instructions.        Treatment for Supraventricular Tachycardia  Supraventricular tachycardia (SVT) is a type of abnormally fast heartbeat. The heart normally beats 60 to 100 beats per minute. With SVT, the heart beats more than 100 times a minute. It may beat as fast as 250 times a minute. This is caused by a problem in the electrical system of the heart. It can lessen the amount of blood pumped through the heart.  Types of treatment  You may not need treatment for SVT if you have only had one episode. If you do need treatment, there are several kinds. They include:    Valsalva maneuver. This is a way to increase pressure in the abdomen and chest. It can correct your heart rhythm right away. To do it, you bear down with your stomach muscles, as though you are trying to have a bowel movement.    Carotid massage. Your healthcare provider may gently rub the carotid artery in your neck. This can also help correct the heart rhythm right away.    Medicine. There are various kinds you can take. Calcium channel blockers or adenosine can help correct heart rhythm right away. If you have SVT only 1 or 2 times a year, you may take beta-blockers or calcium channel medicine as needed. If your SVT is more frequent, you may need to take medicine every day. Some people may need to take several medicines to prevent episodes of SVT.    Electrocardioversion. This is a shock to the heart to restart a normal rhythm right away. This may be done if you have a severe episode of SVT.    Catheter ablation. This can help permanently stop SVT. Your healthcare provider puts a thin, flexible tube (catheter) into a blood vessel in the groin. He or she then gently pushes it up into your heart. The area of your  heart that causes your SVT is then burned. This prevents that area from starting a signal that causes SVT.   Lifestyle changes to help prevent SVT episodes  Your healthcare provider might suggest other ways to help prevent SVT, such as:    Having less alcohol and caffeine    Not smoking    Lowering your stress    Eating foods that are healthy for your heart  When to call your healthcare provider  Call your healthcare provider if you have any of the following:    Sudden shortness of breath (call 911)    Severe palpitations    Severe dizziness    Severe chest pain    Symptoms that are happening more often   Date Last Reviewed: 8/10/2015    [id number] [employer]. [address] [place] [address]. All rights reserved. This information is not intended as a substitute for professional medical care. Always follow your healthcare professional's instructions.

## 2018-09-24 NOTE — ASSESSMENT & PLAN NOTE
<1% of the time, longest episode 15 beats, no tx needed unless pt starts having more frequent or bothersome symptoms.

## 2018-09-24 NOTE — PROGRESS NOTES
SUBJECTIVE:   Martha Neal is a 58 year old female who presents to clinic today for the following health issues:    Results      Duration: 8/20/18    Description (location/character/radiation): patient is here today to discuss zio patch results    Intensity:  n/a    Accompanying signs and symptoms: n/a    History (similar episodes/previous evaluation): None    Precipitating or alleviating factors: None    Therapies tried and outcome: None     Reviewed and updated as needed this visit by clinical staff  Tobacco  Allergies  Meds  Problems  Med Hx  Surg Hx  Fam Hx  Soc Hx        Reviewed and updated as needed this visit by Provider  Tobacco  Allergies  Meds  Problems  Med Hx  Surg Hx  Fam Hx  Soc Hx        Additional complaints: None    HPI additional notes: Martha presents today with   Chief Complaint   Patient presents with     Results     Zio patch   Got a zio patch after physical due to morning palpitations when resting.  Has not had any symptoms for the last 10 days.         ROS:  C: Negative for fever, chills, recent change in weight  Skin: Negative for worrisome rashes or lesions  ENT: Negative for ear, mouth and throat problems  Resp: Negative for significant cough or SOB  CV: Negative for chest pain or peripheral edema  GI: Negative for nausea, abdominal pain, heartburn, or change in bowel habits  MS: Negative for significant arthralgias or myalgias  P: Negative for changes in mood or affect  ROS all other systems negative.    Chart Review:  History   Smoking Status     Current Every Day Smoker     Packs/day: 0.25     Years: 45.00     Types: Cigarettes   Smokeless Tobacco     Never Used     Recent Labs   Lab Test  08/16/18   0852  09/27/17   0853  06/27/17   0955  02/28/16   1811   12/10/15   1000   A1C  6.5*  6.5*  6.7*   --    < >   --    LDL  101*   --   100*   --    --   134*   HDL  64   --   71   --    --   67   TRIG  135   --   152*   --    --   108   CR  0.88   --    --   0.74   --     --    GFRESTIMATED  65   --    --   80   --    --    GFRESTBLACK  79   --    --   >90   GFR Calc     --    --    POTASSIUM  3.9   --    --   3.9   --    --    TSH  2.78   --    --    --    --    --     < > = values in this interval not displayed.      BP Readings from Last 3 Encounters:   09/24/18 122/80   08/16/18 130/86   03/22/18 116/76    Wt Readings from Last 3 Encounters:   09/24/18 160 lb (72.6 kg)   08/16/18 156 lb (70.8 kg)   03/22/18 159 lb (72.1 kg)                  Patient Active Problem List   Diagnosis     Tobacco abuse     Primary osteoarthritis of left ankle     Right-sided low back pain without sciatica, unspecified chronicity     Type 2 diabetes mellitus without complication, without long-term current use of insulin (H)     Mixed hyperlipidemia     Family history of BRCA gene positive     Paroxysmal supraventricular tachycardia (H)     Past Surgical History:   Procedure Laterality Date     HYSTERECTOMY  2002     HYSTERECTOMY, PAP NO LONGER INDICATED       Problem list, Medication list, Allergies, Medical/Social/Surg hx reviewed in [employer], updated as appropriate.   OBJECTIVE:                                                    /80  Pulse 79  Temp 97  F (36.1  C) (Tympanic)  Resp 14  Wt 160 lb (72.6 kg)  LMP  (LMP Unknown)  SpO2 98%  BMI 29.99 kg/m2  Body mass index is 29.99 kg/(m^2).  GENERAL: healthy, alert, in no acute distress  RESP: lungs clear to auscultation - no rales, no rhonchi, no wheezes  CV: regular rate and rhythm, normal S1 S2. No peripheral edema.  SKIN: no suspicious lesions, no rashes  PSYCH: Alert and oriented times 3;  Able to articulate logical thoughts. Affect is normal.    Diagnostic test results: none      ASSESSMENT/PLAN:                                                          ICD-10-CM    1. Paroxysmal supraventricular tachycardia (H) I47.1      Paroxysmal supraventricular tachycardia (H)  <1% of the time, longest episode 15 beats, no tx needed  unless pt starts having more frequent or bothersome symptoms.    Please see patient instructions for treatment details.    Return in about 6 months (around 3/24/2019) for Diabetes.    Jinny Raya PA-C  [facility]

## 2019-01-15 ENCOUNTER — OFFICE VISIT (OUTPATIENT)
Dept: FAMILY MEDICINE | Facility: CLINIC | Age: 59
End: 2019-01-15
Payer: COMMERCIAL

## 2019-01-15 VITALS
SYSTOLIC BLOOD PRESSURE: 154 MMHG | OXYGEN SATURATION: 99 % | BODY MASS INDEX: 30.17 KG/M2 | DIASTOLIC BLOOD PRESSURE: 82 MMHG | RESPIRATION RATE: 14 BRPM | TEMPERATURE: 97.4 F | WEIGHT: 161 LBS | HEART RATE: 104 BPM

## 2019-01-15 DIAGNOSIS — M54.42 ACUTE LEFT-SIDED LOW BACK PAIN WITH LEFT-SIDED SCIATICA: Primary | ICD-10-CM

## 2019-01-15 PROCEDURE — 99213 OFFICE O/P EST LOW 20 MIN: CPT | Performed by: PHYSICIAN ASSISTANT

## 2019-01-15 RX ORDER — METHYLPREDNISOLONE 4 MG
TABLET, DOSE PACK ORAL
Qty: 21 TABLET | Refills: 0 | Status: SHIPPED | OUTPATIENT
Start: 2019-01-15 | End: 2019-03-21

## 2019-01-15 RX ORDER — HYDROCODONE BITARTRATE AND ACETAMINOPHEN 5; 325 MG/1; MG/1
1 TABLET ORAL 3 TIMES DAILY PRN
Qty: 30 TABLET | Refills: 0 | Status: SHIPPED | OUTPATIENT
Start: 2019-01-15 | End: 2019-03-21

## 2019-01-15 NOTE — PATIENT INSTRUCTIONS
Patient Education     Understanding Lumbar Radiculopathy    Lumbar radiculopathy is irritation or inflammation of a nerve root in the low back. It causes symptoms that spread out from the back down one or both legs. To understand this condition, it helps to understand the parts of the spine:    Vertebrae. These are bones that stack to form the spine. The lumbar spine contains the 5 bottom vertebrae.    Disks. These are soft pads of tissue between the vertebrae. They act as shock absorbers for the spine.    Spinal canal. This is a tunnel formed within the stacked vertebrae. In the lumbar spine, nerves run through this canal.    Nerves. These branch off and leave the spinal canal, traveling out to parts of the body. As they leave the spinal canal, nerves pass through openings between the vertebrae. The nerve root is the part of the nerve that is closest to the spinal canal.    Sciatic nerve. This is a large nerve formed from several nerve roots in the low back. This nerve extends down the back of the leg to the foot.  With lumbar radiculopathy, nerve roots in the low back become irritated. This leads to pain and symptoms. The sciatic nerve is commonly involved, so the condition is often called sciatica.  What causes lumbar radiculopathy?  Aging, injury, poor posture, extra body weight, and other issues can lead to problems in the low back. These problems may then irritate nerve roots. They include:    Damage to a disk in the lumbar spine. The damaged disk may then press on nearby nerve roots.    Degeneration from wear and tear, and aging. This can lead to narrowing (stenosis) of the openings between the vertebrae. The narrowed openings press on nerve roots as they leave the spinal canal.    Unstable spine. This is when a vertebra slips forward. It can then press on a nerve root.  Other, less common things can put pressure on nerves in the low back. These include diabetes, infection, or a tumor.  Symptoms of lumbar  radiculopathy  These include:    Pain in the low back    Pain, numbness, tingling, or weakness that travels into the buttocks, hip, groin, or leg    Muscle spasms  Treatment for lumbar radiculopathy  In most cases, your healthcare provider will first try treatments that help relieve symptoms. These may include:    Prescription and over-the-counter pain medicines. These help relieve pain, swelling, and irritation.    Limits on positions and activities that increase pain. But lying in bed or avoiding all movement is only recommended for a short period of time.    Physical therapy, including exercises and stretches. This helps decrease pain and increase movement and function.    Steroid shots into the lower back. This may help relieve symptoms for a time.    Weight-loss program. If you are overweight, losing extra pounds may help relieve symptoms.  In some cases, you may need surgery to fix the underlying problem. This depends on the cause, the symptoms, and how long the pain has lasted.  Possible complications  Over time, an irritated and inflamed nerve may become damaged. This may lead to long-lasting (permanent) numbness or weakness in your legs and feet. If symptoms change suddenly or get worse, be sure to let your healthcare provider know.  When to call your healthcare provider  Call your healthcare provider right away if you have any of these:    New pain or pain that gets worse    New or increasing weakness, tingling, or numbness in your leg or foot    Problems controlling your bladder or bowel   Date Last Reviewed: 3/10/2016    [id number] [employer]. [address] [place] [address]. All rights reserved. This information is not intended as a substitute for professional medical care. Always follow your healthcare professional's instructions.             Low Back Pain            What is low back pain?   Low back pain is pain and stiffness in the lower back. It is one of the most common  reasons people miss work.   How does it occur?   Your lower back is called your lumbar spine. It is made up of 5 bones called lumbar vertebrae. In between the vertebrae are shock absorbers called disks. Back pain can occur from an injury to the vertebrae or when a disk bulges or herniates.   Low back pain is usually caused when a ligament or muscle holding a vertebra in its proper position is strained. Vertebrae are bones that make up the spinal column through which the spinal cord passes. When these muscles or ligaments become weak or strained, the spine loses its stability, resulting in pain.   Low back pain can occur if your job involves lifting and carrying heavy objects, or if you spend a lot of time sitting or standing in one position or bending over. It can be caused by a fall or by unusually strenuous exercise. It can be brought on by the tension and stress that cause headaches in some people. It can even be brought on by violent sneezing or coughing.   People who are overweight may have low back pain because of the added stress on their back.   Back pain may occur when the muscles, joints, bones, and connective tissues of the back become inflamed as a result of an infection or an immune system problem. Arthritic disorders as well as some congenital and degenerative conditions may cause back pain.   Back pain accompanied by loss of bladder or bowel control, trouble moving your legs, or numbness or tingling in your arms or legs requires immediate medical treatment.   What are the symptoms?   Symptoms include:   pain in the back or legs   stiffness, spasm, or limited motion   The pain may be constant or may happen only in certain positions. It may get worse when you cough, sneeze, bend, twist, or strain during a bowel movement. The pain may be in only one spot or may spread to other areas, most commonly down the buttocks and into the back of the thigh.   A low back strain typically does not produce pain past  the knee into the calf or foot. Tingling or numbness in the calf or foot may indicate a herniated disk or pinched nerve.   Be sure to see your healthcare provider if:   You have weakness in your leg, especially if you cannot lift your foot, because this may be a sign of nerve damage.   You have new bowel or bladder problems as well as back pain, which may be a sign of severe injury to your spinal cord.   You have pain that gets worse despite treatment.   How is it diagnosed?   Your healthcare provider will review your medical history and examine you. You may have X-rays, an MRI, CT scan, or a bone scan.   How is it treated?   To treat this condition:   Put an ice pack, gel pack, or package of frozen vegetables, wrapped in a cloth on the area every 3 to 4 hours, for up to 20 minutes at a time for the first 2 or 3 days.   Use a heating pad or hot water bottle. Don't let the heating pad get too hot, and don't fall asleep with it. You could get a burn.   Rest in bed on a firm mattress. Often it helps to lie on your back with your knees raised on a pillow. However, some people prefer to lie on their side with their knees bent. It's best to try to stay active, so try not to rest in bed longer than 1 to 2 days.   Take muscle relaxants as recommended by your healthcare provider.   Take an anti-inflammatory such as ibuprofen, or other medicine as directed by your provider. Nonsteroidal anti-inflammatory medicines (NSAIDs) may cause stomach bleeding and other problems. These risks increase with age. Read the label and take as directed. Unless recommended by your healthcare provider, do not take for more than 10 days.   Get a back massage by a trained person.   Wear a belt or corset to support your back.   Do the exercises recommended by your provider. Your provider may also prescribe physical therapy.   Talk with a counselor, if your back pain is related to tension caused by emotional problems.   When the pain is gone, ask  your healthcare provider about starting an exercise program such as the following:   Exercise moderately every day, using stretching and warm-up exercises suggested by your provider or physical therapist.   Exercise vigorously for about 30 minutes 3 times a week by walking, swimming, using a stationary bicycle, or doing low-impact aerobics.   Exercising regularly will not only help your back, it will also help keep you healthier overall.   How long will the effects last?   The effects of back pain last as long as the cause exists or until your body recovers from the strain, usually a day or two but sometimes weeks.   How can I take care of myself?   In addition to the treatment described above, keep in mind these suggestions:   Practice good posture. Stand with your head up, shoulders straight, chest forward, weight balanced evenly on both feet, and pelvis tucked in.   Lose weight if you are overweight   Keep your core muscles strong. These are your abdominal and back muscles.   Sleep without a pillow under your head.   Pain is the best way to  the pace you should set in increasing your activity and exercise. Minor discomfort, stiffness, soreness, and mild aches need not interfere with activity. However, limit your activities temporarily if:   Your symptoms return.   The pain increases when you are more active.   The pain increases within 24 hours after a new or higher level of activity.   When can I return to my normal activities?   Everyone recovers from an injury at a different rate. Return to your activities depends on how soon your back recovers, not by how many days or weeks it has been since your injury has occurred. In general, the longer you have symptoms before you start treatment, the longer it will take to get better. The goal is to return to your normal activities as soon as is safely possible. If you return too soon you may worsen your injury.   It is important that you have fully recovered from  your low back pain before you return to any strenuous activity. You must be able to have the same range of motion that you had before your injury. You must be able to walk and twist without pain.   What can I do to help prevent low back pain?   You can reduce the strain on your back by doing the following:   Don't push with your arms when you move a heavy object. Turn around and push backwards so the strain is taken by your legs.   Whenever you sit, sit in a straight-backed chair and hold your spine against the back of the chair.   Bend your knees and hips and keep your back straight when you lift a heavy object.   Avoid lifting heavy objects higher than your waist.   Hold packages you carry close to your body, with your arms bent.   Use a footrest for one foot when you stand or sit in one spot for a long time. This keeps your back straight.   Bend your knees when you bend over.   Sit close to the pedals when you drive and use your seat belt and a hard backrest or pillow.   Lie on your side with your knees bent when you sleep or rest. It may help to put a pillow between your knees.   Put a pillow under your knees when you sleep on your back.   Raise the foot of the bed 8 inches to discourage sleeping on your stomach unless you have other problems that require that you keep your head elevated.   To rest your back, hold each of these positions for 5?minutes or longer:   Lie on your back, bend your knees, and put pillows under your knees.   Lie on your back on the floor with a pillow under your neck. Bend your knees to a 90-degree angle, and put your lower legs and feet on a chair.   Lie on your back, bend your knees, and bring one knee up to your chest and hold it there. Repeat with the other knee, then bring both knees to your chest. When holding your knee to your chest, grab your thigh rather than your lower leg to avoid over flexing your knee.     Published by [employer].  This content is reviewed periodically and  is subject to change as new health information becomes available. The information is intended to inform and educate and is not a replacement for medical evaluation, advice, diagnosis or treatment by a healthcare professional.   Developed by Pat Vega RN, [place], and [facility].   ? 2010 [facility] and/or its affiliates. All Rights Reserved.           Low Back Pain Exercise          Standing hamstring stretch: Put the heel of one leg on a stool about 15 inches high. Keep your leg straight. Lean forward, bending at the hips until you feel a mild stretch in the back of your thigh. Make sure you do not roll your shoulders or bend at the waist when doing this. You want to stretch your leg, not your lower back. Hold the stretch for 15 to 30 seconds. Repeat with each leg 3 times.   Cat and camel: Get down on your hands and knees. Let your stomach sag, allowing your back to curve downward. Hold this position for 5 seconds. Then arch your back and hold for 5 seconds. Do 3 sets of 10.   Quadruped arm and leg raise: Get down on your hands and knees. Pull in your belly button and tighten your abdominal muscles to stiffen your spine. While keeping your abdominals tight, raise one arm and the opposite leg away from you. Hold this position for 5 seconds. Lower your arm and leg slowly and change sides. Do this 10 times on each side.   Pelvic tilt: Lie on your back with your knees bent and your feet flat on the floor. Tighten your abdominal muscles and push your lower back into the floor. Hold this position for 5 seconds, then relax. Do 3 sets of 10.   Partial curl: Lie on your back with your knees bent and your feet flat on the floor. Tighten your stomach muscles. Tuck your chin to your chest. With your hands stretched out in front of you, curl your upper body forward until your shoulders clear the floor. Hold this position for 3 seconds. Don't hold your breath. It helps to breathe out as you lift your shoulders up. Relax  back to the floor. Repeat 10 times. Build to 3 sets of 10. To challenge yourself, clasp your hands behind your head and keep your elbows out to the side.   Gluteal stretch: Lie on your back with both knees bent. Rest the ankle of one leg over the knee of your other leg. Grasp the thigh of the bottom leg and pull toward your chest. You will feel a stretch along the buttocks and possibly along the outside of your hip. Hold the stretch for 15 to 30 seconds. Repeat 3 times with each leg.   Extension exercise:   0. Lie face down on the floor for 5 minutes. If this hurts too much, lie face down with a pillow under your stomach. This should relieve your leg or back pain. When you can lie on your stomach for 5 minutes without a pillow, you can continue with Part B of this exercise.   0. After lying on your stomach for 5 minutes, prop yourself up on your elbows for another 5 minutes. If you can do this without having more leg or buttock pain, you can start doing part C of this exercise.   0. Lie on your stomach with your hands under your shoulders. Then press down on your hands and extend your elbows while keeping your hips flat on the floor. Hold for 1 second and lower yourself to the floor. Do 3 to 5 sets of 10 repetitions. Rest for 1 minute between sets. You should have no pain in your legs when you do this, but it is normal to feel some pain in your lower back.   Do this exercise several times a day.   Side plank: Lie on your side with your legs, hips, and shoulders in a straight line. Prop yourself up onto your forearm so your elbow is directly under your shoulder. Lift your hips off the floor and balance on your forearm and the outside of your foot. Try to hold this position for 15 seconds, then slowly lower your hip to the ground. Switch sides and repeat. Work up to holding for 1 minute or longer. This exercise can be made easier by starting with your knees and hips flexed toward your chest.   Published by  [employer].  This content is reviewed periodically and is subject to change as new health information becomes available. The information is intended to inform and educate and is not a replacement for medical evaluation, advice, diagnosis or treatment by a healthcare professional.   Written by Ros Major MS, PT, and Pat Carbajal PT, [facility], [facility], for [employer]   ? 2010 [employer] and/or its affiliates. All Rights Reserved.         Copyright   [employer] 2011

## 2019-01-15 NOTE — PROGRESS NOTES
SUBJECTIVE:   Martha Neal is a 58 year old female who presents to clinic today for the following health issues:    Back Pain     Duration: 2 weeks, symptoms are slightly better now than they were at onset but still has not significantly improved.          Specific cause: none    Description:   Location of pain: low back left  Character of pain: sharp and dull ache  Pain radiation:radiates into the left buttocks and radiates into the left leg  New numbness or weakness in legs, not attributed to pain:  no     Intensity: moderate, severe    History:   Pain interferes with job: Not applicable  History of back problems: has had prior issues about 1-2 years ago, similar pain  Any previous MRI or X-rays: None  Sees a specialist for back pain:  No  Therapies tried without relief: heat and ice.    Alleviating factors:   Improved by: ibuprofen-some what helpful, left over norco-help  And lying down    Precipitating factors:  Worsened by: Lifting, Bending and Coughing    Accompanying Signs & Symptoms:  Risk of Fracture:  None  Risk of Cauda Equina:  None  Risk of Infection:  None  Risk of Cancer:  None  Risk of Ankylosing Spondylitis:  Onset at age <35, male, AND morning back stiffness. no   Reviewed and updated as needed this visit by clinical staff  Tobacco  Allergies  Meds  Problems  Med Hx  Surg Hx  Fam Hx  Soc Hx        Reviewed and updated as needed this visit by Provider  Tobacco  Allergies  Meds  Problems  Med Hx  Surg Hx  Fam Hx  Soc Hx        Additional complaints: None    HPI additional notes: Martha presents today with   Chief Complaint   Patient presents with     Back Pain   turning either direction or reaching either direction is painful.       ROS:  C: Negative for fever, chills, recent change in weight  Skin: Negative for worrisome rashes or lesions  Resp: Negative for significant cough or SOB  CV: Negative for chest pain or peripheral edema  GI: Negative for nausea, abdominal pain,  heartburn, or change in bowel habits  MS: Positive for left lower back pain  NEURO: NEGATIVE for numbness, tingling, or radicular pain.  P: Negative for changes in mood or affect  ROS otherwise negative.    Chart Review:  History   Smoking Status     Current Every Day Smoker     Packs/day: 0.25     Years: 45.00     Types: Cigarettes   Smokeless Tobacco     Never Used       Recent Labs   Lab Test 08/16/18  0852 09/27/17  0853 06/27/17  0955 02/28/16  1811  12/10/15  1000   A1C 6.5* 6.5* 6.7*  --    < >  --    *  --  100*  --   --  134*   HDL 64  --  71  --   --  67   TRIG 135  --  152*  --   --  108   CR 0.88  --   --  0.74  --   --    GFRESTIMATED 65  --   --  80  --   --    GFRESTBLACK 79  --   --  >90  African American GFR Calc    --   --    POTASSIUM 3.9  --   --  3.9  --   --    TSH 2.78  --   --   --   --   --     < > = values in this interval not displayed.      BP Readings from Last 3 Encounters:   01/15/19 154/82   09/24/18 122/80   08/16/18 130/86    Wt Readings from Last 3 Encounters:   01/15/19 73 kg (161 lb)   09/24/18 72.6 kg (160 lb)   08/16/18 70.8 kg (156 lb)                    Patient Active Problem List   Diagnosis     Tobacco abuse     Primary osteoarthritis of left ankle     Right-sided low back pain without sciatica, unspecified chronicity     Type 2 diabetes mellitus without complication, without long-term current use of insulin (H)     Mixed hyperlipidemia     Family history of BRCA gene positive     Paroxysmal supraventricular tachycardia (H)     Past Surgical History:   Procedure Laterality Date     HYSTERECTOMY  2002     HYSTERECTOMY, PAP NO LONGER INDICATED       Problem list, Medication list, Allergies, Medical/Social/Surg hx reviewed in [facility], updated as appropriate.   OBJECTIVE:                                                    /82   Pulse 104   Temp 97.4  F (36.3  C) (Tympanic)   Resp 14   Wt 73 kg (161 lb)   LMP  (LMP Unknown)   SpO2 99%   BMI 30.17 kg/m    Body  mass index is 30.17 kg/m .  GENERAL: healthy, alert, in no acute distress  HENT: Mucous mebranes moist.  ORTHO: Lumber/Thoracic Spine Exam: Tender:  lumbar spinous processes, lumbar facet joints  Non-tender:  left para lumbar muscles, left SI joint, left sciatic notch  Range of Motion:  lumbar flexion  painful, lumbar extension  painful, left lateral lumbar bending  painful, right lateral lumbar bending  painful, left lateral lumbar rotation  painful, right lateral lumbar rotation  painful  Strength:  able to heel walk, able to toe walk  SKIN: no suspicious lesions, no rashes  PSYCH: Alert and oriented times 3;  Able to articulate logical thoughts. Affect is normal.    Diagnostic test results: none      ASSESSMENT/PLAN:                                                          ICD-10-CM    1. Acute left-sided low back pain with left-sided sciatica M54.42 HYDROcodone-acetaminophen (NORCO) 5-325 MG tablet     methylPREDNISolone (MEDROL DOSEPAK) 4 MG tablet therapy pack     If not improving, will call for PT orders.    Please see patient instructions for treatment details.    Return in about 1 week (around 1/22/2019) for PT.      Jinny Raya PA-C  [facility]

## 2019-03-21 ENCOUNTER — OFFICE VISIT (OUTPATIENT)
Dept: FAMILY MEDICINE | Facility: CLINIC | Age: 59
End: 2019-03-21
Payer: COMMERCIAL

## 2019-03-21 VITALS
BODY MASS INDEX: 30.02 KG/M2 | SYSTOLIC BLOOD PRESSURE: 120 MMHG | TEMPERATURE: 98 F | DIASTOLIC BLOOD PRESSURE: 78 MMHG | HEIGHT: 61 IN | RESPIRATION RATE: 16 BRPM | HEART RATE: 110 BPM | WEIGHT: 159 LBS

## 2019-03-21 DIAGNOSIS — K11.20 SUBMANDIBULAR SIALOADENITIS: Primary | ICD-10-CM

## 2019-03-21 PROCEDURE — 99213 OFFICE O/P EST LOW 20 MIN: CPT | Performed by: PHYSICIAN ASSISTANT

## 2019-03-21 RX ORDER — CEPHALEXIN 500 MG/1
500 CAPSULE ORAL 4 TIMES DAILY
Qty: 40 CAPSULE | Refills: 0 | Status: SHIPPED | OUTPATIENT
Start: 2019-03-21 | End: 2019-03-23

## 2019-03-21 ASSESSMENT — MIFFLIN-ST. JEOR: SCORE: 1232.56

## 2019-03-21 NOTE — PROGRESS NOTES
SUBJECTIVE:   Martha Neal is a 58 year old female who presents to clinic today for the following health issues:      Mouth/Throat Problem      Duration: 4 days    Description (location/character/radiation): lt side    Intensity:  mild    Accompanying signs and symptoms: possible blocked gland    History (similar episodes/previous evaluation): None    Precipitating or alleviating factors: None    Therapies tried and outcome: heat, light massage, rinsing with lemon juice     Reviewed and updated as needed this visit by clinical staff  Tobacco  Allergies  Meds  Problems  Med Hx  Surg Hx  Fam Hx  Soc Hx        Reviewed and updated as needed this visit by Provider  Tobacco  Allergies  Meds  Problems  Med Hx  Surg Hx  Fam Hx  Soc Hx        Additional complaints: None    HPI additional notes: Martha presents today with   Chief Complaint   Patient presents with     Mouth Problem   Worsens throughout the day, no known fevers.           ROS:  C: Negative for fever, chills, recent change in weight  Skin: Negative for worrisome rashes or lesions  ENT/MOUTH:POSITIVE for pain in cheek, trouble chewing.  Negative for congestion, sore throat and pain with swallowing.  Resp: Negative for significant cough or SOB  MS: Negative for significant arthralgias or myalgias  NEURO: Negative  for headaches or dizziness.  P: Negative for changes in mood or affect  ROS otherwise negative.    Chart Review:  History   Smoking Status     Current Every Day Smoker     Packs/day: 0.25     Years: 45.00     Types: Cigarettes   Smokeless Tobacco     Never Used     Patient Active Problem List   Diagnosis     Tobacco abuse     Primary osteoarthritis of left ankle     Right-sided low back pain without sciatica, unspecified chronicity     Type 2 diabetes mellitus without complication, without long-term current use of insulin (H)     Mixed hyperlipidemia     Family history of BRCA gene positive     Paroxysmal supraventricular tachycardia  "(H)     Past Surgical History:   Procedure Laterality Date     HYSTERECTOMY  2002     HYSTERECTOMY, PAP NO LONGER INDICATED       Problem list, Medication list, Allergies, Medical/Social/Surg hx reviewed in [facility], updated as appropriate.   OBJECTIVE:                                                    /78 (Cuff Size: Adult Regular)   Pulse 110   Temp 98  F (36.7  C) (Tympanic)   Resp 16   Ht 1.556 m (5' 1.25\")   Wt 72.1 kg (159 lb)   LMP  (LMP Unknown)   BMI 29.80 kg/m    Body mass index is 29.8 kg/m .  GENERAL: healthy, alert, in no acute distress  HENT: Mucous mebranes moist, submandibular swelling and tenderness to palpation on left side  NECK:tender and 2+ submental LAD left  SKIN: no suspicious lesions, no rashes  PSYCH: Alert and oriented times 3;  Able to articulate logical thoughts. Affect is normal.    Diagnostic test results: None     ASSESSMENT/PLAN:                                                          ICD-10-CM    1. 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  If not improving in 2 days, call clinic and will change antibiotic to augmentin.    Please see patient instructions for treatment details.    Return in about 2 days (around 3/23/2019) for MyC Message, phone call to clinic.      Jinny Raya PA-C  [facility]       "

## 2019-03-21 NOTE — PATIENT INSTRUCTIONS
"  Patient Education     Salivary Gland Infection  Salivary glands make saliva. Saliva is mostly water. It also has minerals and proteins that help break down food and keep the mouth and teeth healthy. There are 3 pairs of salivary glands:    Parotid glands (in front of the ear)    Submandibular glands (below the jaw)    Sublingual glands (below the tongue)  A salivary gland can become infected by bacteria (germs). Things that make this more likely include dehydration and taking medicines that affect saliva flow. Infection is also more likely when the tube (duct) that carries saliva from the gland to the mouth is blocked. It may be blocked by a salivary gland \"stone.\" This is a collection of minerals that forms in the salivary gland.  Signs of infection include fever, severe pain in the gland, and redness and swelling over the gland. It may hurt to open the mouth. Symptoms may be worse when the flow of saliva is stimulated, such as by the smell of food.   Antibiotics are used to treat the infection. Drainage of the infection with a simple surgery may be needed. If you have a salivary gland stone, a procedure may be done to remove it.  Home Care:    Take antibiotics as directed until they are finished. Do this even if you start to feel better after only a few days.    Unless another medicine was prescribed, take over-the-counter medicines, such as acetaminophen or ibuprofen, to help relieve pain.    Moist heat can also help relieve swelling and pain. Wet a cloth with warm water and put it over the sore gland for 10-15 minutes several times a day.    Gently massage the gland a few times a day.     Suck on lemon or other tart hard candies to cause flow of saliva.  To help prevent stones and infections:    Drink 6-8 glasses of fluid per day (such as water, tea, and clear soup) to keep well hydrated.    If you smoke, ask your healthcare provider for help to quit. Smoking makes salivary gland stones more likely.    Keep " good dental hygiene. Brush and floss your teeth daily. See your dentist for regular cleanings.  Follow-up care  Follow up with your healthcare provider or as advised.   When to seek medical advice  Call your healthcare provider if any of the following occur:    Fever over 100.4 F (38 C) after 2 days of taking antibiotics    Symptoms that get worse or don't get better in a few days    Trouble breathing or swallowing  Date Last Reviewed: 10/1/2017    9949-5134 [employer]. [address] [place] [address]. All rights reserved. This information is not intended as a substitute for professional medical care. Always follow your healthcare professional's instructions.

## 2019-03-23 ENCOUNTER — TELEPHONE (OUTPATIENT)
Dept: FAMILY MEDICINE | Facility: CLINIC | Age: 59
End: 2019-03-23

## 2019-03-23 DIAGNOSIS — K11.20 SUBMANDIBULAR GLAND INFECTION: Primary | ICD-10-CM

## 2019-03-23 NOTE — TELEPHONE ENCOUNTER
Reason for call:  Patient reporting a symptom    Symptom or request: submandibular gland inf    Duration (how long have symptoms been present): pt saw WANG Raya on 3-21    Have you been treated for this before? Yes    Additional comments: Per office visit notes pt was to call back if antibiotic wasn't working and switch to Augmentin (see visit note from 3-21 WANG Raya)  Pt is not improving.    Phone Number patient can be reached at:  Home number on file [phone] (home)    Best Time:  any    Can we leave a detailed message on this number:  YES    Call taken on 3/23/2019 at 8:16 AM by RYAN SAMAYOA

## 2019-10-03 ENCOUNTER — TELEPHONE (OUTPATIENT)
Dept: FAMILY MEDICINE | Facility: CLINIC | Age: 59
End: 2019-10-03

## 2019-10-03 NOTE — TELEPHONE ENCOUNTER
Panel Management Review      Patient has the following on her problem list:     Diabetes    ASA: Failed    Last A1C  Lab Results   Component Value Date    A1C 6.5 08/16/2018    A1C 6.5 09/27/2017    A1C 6.7 06/27/2017    A1C 6.3 12/16/2015     A1C tested: MONITOR    Last LDL:    Lab Results   Component Value Date    CHOL 192 08/16/2018     Lab Results   Component Value Date    HDL 64 08/16/2018     Lab Results   Component Value Date     08/16/2018     Lab Results   Component Value Date    TRIG 135 08/16/2018     No results found for: CHOLHDLRATIO  Lab Results   Component Value Date    NHDL 128 08/16/2018       Is the patient on a Statin? NO             Is the patient on Aspirin? NO        Last three blood pressure readings:  BP Readings from Last 3 Encounters:   03/21/19 120/78   01/15/19 154/82   09/24/18 122/80       Date of last diabetes office visit: 3/21/19     Tobacco History:     History   Smoking Status     Current Every Day Smoker     Packs/day: 0.25     Years: 45.00     Types: Cigarettes   Smokeless Tobacco     Never Used           Composite cancer screening  Chart review shows that this patient is due/due soon for the following Fecal Colorectal (FIT)  Summary:    Patient is due/failing the following:   Diabetes check    Action needed:   Patient needs office visit for diabetes check.    Type of outreach:    Phone, spoke to patient.  Scheduled her for 10/8/19.    Questions for provider review:    None

## 2019-10-03 NOTE — TELEPHONE ENCOUNTER
----- Message from Jinny Raya PA-C sent at 10/3/2019  9:55 AM CDT -----  Please contact pt.  They are due for Diabetes Check.

## 2019-10-04 ENCOUNTER — OFFICE VISIT (OUTPATIENT)
Dept: FAMILY MEDICINE | Facility: CLINIC | Age: 59
End: 2019-10-04
Payer: COMMERCIAL

## 2019-10-04 VITALS
HEART RATE: 93 BPM | SYSTOLIC BLOOD PRESSURE: 130 MMHG | DIASTOLIC BLOOD PRESSURE: 82 MMHG | BODY MASS INDEX: 30.45 KG/M2 | TEMPERATURE: 98.7 F | OXYGEN SATURATION: 97 % | WEIGHT: 162.5 LBS | RESPIRATION RATE: 16 BRPM

## 2019-10-04 DIAGNOSIS — M54.50 ACUTE LEFT-SIDED LOW BACK PAIN WITHOUT SCIATICA: Primary | ICD-10-CM

## 2019-10-04 PROCEDURE — 99213 OFFICE O/P EST LOW 20 MIN: CPT | Performed by: FAMILY MEDICINE

## 2019-10-04 RX ORDER — METHYLPREDNISOLONE 4 MG
TABLET, DOSE PACK ORAL
Qty: 21 TABLET | Refills: 0 | Status: SHIPPED | OUTPATIENT
Start: 2019-10-04 | End: 2019-12-04

## 2019-10-04 RX ORDER — CYCLOBENZAPRINE HCL 10 MG
10 TABLET ORAL 3 TIMES DAILY PRN
Qty: 30 TABLET | Refills: 0 | Status: SHIPPED | OUTPATIENT
Start: 2019-10-04 | End: 2019-12-04

## 2019-10-04 NOTE — PROGRESS NOTES
Subjective     Martha Neal is a 59 year old female who presents to clinic today for the following health issues:    Pt here today with a friend    HPI   Back Pain       Duration: Sudden onset yesterday        Specific cause: injured while dusting    Description:   Location of pain: low back left  Character of pain: dull ache, stabbing, burning, cramping and constant  Pain radiation:none  New numbness or weakness in legs, not attributed to pain:  no     Intensity: Currently 2/10, At its worst 10/10    History:   Pain interferes with job: YES, No, Not applicable  History of back problems: recurrent self limited episodes of low back pain in the past  Any previous MRI or X-rays: None  Sees a specialist for back pain:  No  Therapies tried without relief: Vicodin from old prescription and cold    Alleviating factors:   Improved by: Sitting      Precipitating factors:  Worsened by: Lifting, Bending, Standing, Sitting, Lying Flat and Walking          Accompanying Signs & Symptoms:  Risk of Fracture:  None  Risk of Cauda Equina:  None  Risk of Infection:  None  Risk of Cancer:  None  Risk of Ankylosing Spondylitis:  Onset at age <35, male, AND morning back stiffness. no                    BP Readings from Last 3 Encounters:   10/04/19 130/82   03/21/19 120/78   01/15/19 154/82    Wt Readings from Last 3 Encounters:   10/04/19 73.7 kg (162 lb 8 oz)   03/21/19 72.1 kg (159 lb)   01/15/19 73 kg (161 lb)                      Reviewed and updated as needed this visit by Provider         Review of Systems   ROS COMP: CONSTITUTIONAL: NEGATIVE for fever, chills, change in weight  ENT/MOUTH: NEGATIVE for ear, mouth and throat problems  RESP: NEGATIVE for significant cough or SOB  CV: NEGATIVE for chest pain, palpitations or peripheral edema  MUSCULOSKELETAL: POSITIVE  for back pain low back      Objective    /82 (Patient Position: Sitting, Cuff Size: Adult Regular)   Pulse 93   Temp 98.7  F (37.1  C) (Tympanic)   Resp 16    Wt 73.7 kg (162 lb 8 oz)   LMP  (LMP Unknown)   SpO2 97%   Breastfeeding? No   BMI 30.45 kg/m    Body mass index is 30.45 kg/m .  Physical Exam   GENERAL: healthy, alert and no distress  NECK: no adenopathy, no asymmetry, masses, or scars and thyroid normal to palpation  RESP: lungs clear to auscultation - no rales, rhonchi or wheezes  CV: regular rate and rhythm, normal S1 S2, no S3 or S4, no murmur, click or rub, no peripheral edema and peripheral pulses strong  ABDOMEN: soft, nontender, no hepatosplenomegaly, no masses and bowel sounds normal  MS: spine exam shows tender to palpation Lt paraspinal muscles.  Tender at SI ligament.  Pt guards standing up straight, able to bend forward more easily.  Pain with Lt lateral abduction     Diagnostic Test Results:  Labs reviewed in Epic  none         Assessment & Plan     Martha was seen today for back pain.    Diagnoses and all orders for this visit:    Acute left-sided low back pain without sciatica  -     methylPREDNISolone (MEDROL DOSEPAK) 4 MG tablet therapy pack; Follow Package Directions  -     cyclobenzaprine (FLEXERIL) 10 MG tablet; Take 1 tablet (10 mg) by mouth 3 times daily as needed for muscle spasms         Tobacco Cessation:   reports that she has been smoking cigarettes. She has a 11.25 pack-year smoking history. She has never used smokeless tobacco.  Tobacco Cessation Action Plan: Information offered: Patient not interested at this time        FUTURE APPOINTMENTS:       - Follow-up visit in 2 weeks   Patient Instructions   Ice packs to area 4-5 times daily for 3 days, then alternate ice & heat.  Use Ice massage on trigger point areas.  Do gentle range of motion exercises      Return in about 2 weeks (around 10/18/2019), or if symptoms worsen or fail to improve, for back pain.    Mike Diaz MD  [facility]

## 2019-10-04 NOTE — PATIENT INSTRUCTIONS
Ice packs to area 4-5 times daily for 3 days, then alternate ice & heat.  Use Ice massage on trigger point areas.  Do gentle range of motion exercises

## 2019-11-05 ENCOUNTER — HEALTH MAINTENANCE LETTER (OUTPATIENT)
Age: 59
End: 2019-11-05

## 2019-12-05 ENCOUNTER — OFFICE VISIT (OUTPATIENT)
Dept: FAMILY MEDICINE | Facility: CLINIC | Age: 59
End: 2019-12-05
Payer: COMMERCIAL

## 2019-12-05 VITALS
SYSTOLIC BLOOD PRESSURE: 132 MMHG | HEART RATE: 84 BPM | WEIGHT: 163 LBS | BODY MASS INDEX: 30.78 KG/M2 | HEIGHT: 61 IN | RESPIRATION RATE: 14 BRPM | TEMPERATURE: 98 F | DIASTOLIC BLOOD PRESSURE: 84 MMHG

## 2019-12-05 DIAGNOSIS — M54.50 ACUTE LEFT-SIDED LOW BACK PAIN WITHOUT SCIATICA: ICD-10-CM

## 2019-12-05 DIAGNOSIS — Z12.11 SCREENING FOR COLON CANCER: ICD-10-CM

## 2019-12-05 DIAGNOSIS — Z12.31 VISIT FOR SCREENING MAMMOGRAM: ICD-10-CM

## 2019-12-05 DIAGNOSIS — Z00.00 ROUTINE HISTORY AND PHYSICAL EXAMINATION OF ADULT: Primary | ICD-10-CM

## 2019-12-05 DIAGNOSIS — E11.9 TYPE 2 DIABETES MELLITUS WITHOUT COMPLICATION, WITHOUT LONG-TERM CURRENT USE OF INSULIN (H): ICD-10-CM

## 2019-12-05 LAB
ANION GAP SERPL CALCULATED.3IONS-SCNC: 3 MMOL/L (ref 3–14)
BUN SERPL-MCNC: 15 MG/DL (ref 7–30)
CALCIUM SERPL-MCNC: 9.3 MG/DL (ref 8.5–10.1)
CHLORIDE SERPL-SCNC: 109 MMOL/L (ref 94–109)
CHOLEST SERPL-MCNC: 190 MG/DL
CO2 SERPL-SCNC: 28 MMOL/L (ref 20–32)
CREAT SERPL-MCNC: 0.84 MG/DL (ref 0.52–1.04)
GFR SERPL CREATININE-BSD FRML MDRD: 75 ML/MIN/{1.73_M2}
GLUCOSE SERPL-MCNC: 135 MG/DL (ref 70–99)
HBA1C MFR BLD: 6.5 % (ref 0–5.6)
HDLC SERPL-MCNC: 61 MG/DL
LDLC SERPL CALC-MCNC: 102 MG/DL
NONHDLC SERPL-MCNC: 129 MG/DL
POTASSIUM SERPL-SCNC: 3.8 MMOL/L (ref 3.4–5.3)
SODIUM SERPL-SCNC: 140 MMOL/L (ref 133–144)
TRIGL SERPL-MCNC: 136 MG/DL

## 2019-12-05 PROCEDURE — 80048 BASIC METABOLIC PNL TOTAL CA: CPT | Performed by: PHYSICIAN ASSISTANT

## 2019-12-05 PROCEDURE — 83036 HEMOGLOBIN GLYCOSYLATED A1C: CPT | Performed by: PHYSICIAN ASSISTANT

## 2019-12-05 PROCEDURE — 82274 ASSAY TEST FOR BLOOD FECAL: CPT | Performed by: PHYSICIAN ASSISTANT

## 2019-12-05 PROCEDURE — 80061 LIPID PANEL: CPT | Performed by: PHYSICIAN ASSISTANT

## 2019-12-05 PROCEDURE — 36415 COLL VENOUS BLD VENIPUNCTURE: CPT | Performed by: PHYSICIAN ASSISTANT

## 2019-12-05 PROCEDURE — 82043 UR ALBUMIN QUANTITATIVE: CPT | Performed by: PHYSICIAN ASSISTANT

## 2019-12-05 PROCEDURE — 99396 PREV VISIT EST AGE 40-64: CPT | Performed by: PHYSICIAN ASSISTANT

## 2019-12-05 PROCEDURE — 99207 C FOOT EXAM  NO CHARGE: CPT | Mod: 25 | Performed by: PHYSICIAN ASSISTANT

## 2019-12-05 ASSESSMENT — MIFFLIN-ST. JEOR: SCORE: 1245.7

## 2019-12-05 NOTE — PROGRESS NOTES
SUBJECTIVE:   CC: Martha Neal is an 59 year old woman who presents for preventive health visit.     Healthy Habits:     Getting at least 3 servings of Calcium per day:  Yes    Bi-annual eye exam:  Yes    Dental care twice a year:  Yes    Sleep apnea or symptoms of sleep apnea:  None    Diet:  Regular (no restrictions)    Frequency of exercise:  6-7 days/week    Duration of exercise:  30-45 minutes    Taking medications regularly:  Yes    Medication side effects:  None    PHQ-2 Total Score: 0    Additional concerns today:  No    Today's PHQ-2 Score:   PHQ-2 ( 1999 Pfizer) 12/5/2019   Q1: Little interest or pleasure in doing things 0   Q2: Feeling down, depressed or hopeless 0   PHQ-2 Score 0   Q1: Little interest or pleasure in doing things Not at all   Q2: Feeling down, depressed or hopeless Not at all   PHQ-2 Score 0       Abuse: Current or Past(Physical, Sexual or Emotional)- No  Do you feel safe in your environment? Yes    Social History     Tobacco Use     Smoking status: Current Every Day Smoker     Packs/day: 0.25     Years: 45.00     Pack years: 11.25     Types: Cigarettes     Smokeless tobacco: Never Used   Substance Use Topics     Alcohol use: No     If you drink alcohol do you typically have >3 drinks per day or >7 drinks per week? No    Alcohol Use 12/5/2019   Prescreen: >3 drinks/day or >7 drinks/week? Not Applicable   Prescreen: >3 drinks/day or >7 drinks/week? -   No flowsheet data found.    Reviewed orders with patient.  Reviewed health maintenance and updated orders accordingly - Yes  BP Readings from Last 3 Encounters:   12/05/19 132/84   10/04/19 130/82   03/21/19 120/78    Wt Readings from Last 3 Encounters:   12/05/19 73.9 kg (163 lb)   10/04/19 73.7 kg (162 lb 8 oz)   03/21/19 72.1 kg (159 lb)                  Recent Labs   Lab Test 08/16/18  0852 09/27/17  0853 06/27/17  0955 02/28/16  1811  12/10/15  1000   A1C 6.5* 6.5* 6.7*  --    < >  --    *  --  100*  --   --  134*   HDL 64   "--  71  --   --  67   TRIG 135  --  152*  --   --  108   CR 0.88  --   --  0.74  --   --    GFRESTIMATED 65  --   --  80  --   --    GFRESTBLACK 79  --   --  >90  African American GFR Calc    --   --    POTASSIUM 3.9  --   --  3.9  --   --    TSH 2.78  --   --   --   --   --     < > = values in this interval not displayed.        Mammogram Screening: Patient over age 50, mutual decision to screen reflected in health maintenance.    Pertinent mammograms are reviewed under the imaging tab.  History of abnormal Pap smear: Status post benign hysterectomy. Health Maintenance and Surgical History updated.     Reviewed and updated as needed this visit by clinical staff  Tobacco  Allergies  Meds  Problems  Med Hx  Surg Hx  Fam Hx  Soc Hx          Reviewed and updated as needed this visit by Provider  Tobacco  Allergies  Meds  Problems  Med Hx  Surg Hx  Fam Hx          Review of Systems  CONSTITUTIONAL: NEGATIVE for fever, chills, change in weight  INTEGUMENTARY/SKIN: NEGATIVE for worrisome rashes, moles or lesions  EYES: NEGATIVE for vision changes or irritation  ENT: NEGATIVE for ear, mouth and throat problems  RESP: NEGATIVE for significant cough or SOB  BREAST: NEGATIVE for masses, tenderness or discharge  CV: NEGATIVE for chest pain, palpitations or peripheral edema  GI: NEGATIVE for nausea, abdominal pain, heartburn, or change in bowel habits  : NEGATIVE for unusual urinary or vaginal symptoms. No vaginal bleeding.  MUSCULOSKELETAL:POSITIVE  for intermittent back pain with sciatica  NEURO: NEGATIVE for weakness, dizziness or paresthesias  PSYCHIATRIC: NEGATIVE for changes in mood or affect      OBJECTIVE:   /84 (Cuff Size: Adult Large)   Pulse 84   Temp 98  F (36.7  C) (Tympanic)   Resp 14   Ht 1.556 m (5' 1.25\")   Wt 73.9 kg (163 lb)   LMP  (LMP Unknown)   BMI 30.55 kg/m    Physical Exam  GENERAL APPEARANCE: healthy, alert and no distress  EYES: Eyes grossly normal to inspection, PERRL and " conjunctivae and sclerae normal  HENT: ear canals and TM's normal, nose and mouth without ulcers or lesions, oropharynx clear and oral mucous membranes moist  NECK: no adenopathy, no asymmetry, masses, or scars and thyroid normal to palpation  RESP: lungs clear to auscultation - no rales, rhonchi or wheezes  BREAST: normal without masses, tenderness or nipple discharge and no palpable axillary masses or adenopathy  CV: regular rate and rhythm, normal S1 S2, no S3 or S4, no murmur, click or rub, no peripheral edema and peripheral pulses strong  ABDOMEN: soft, nontender, no hepatosplenomegaly, no masses and bowel sounds normal  MS: no musculoskeletal defects are noted and gait is age appropriate without ataxia  SKIN: no suspicious lesions or rashes  NEURO: Normal strength and tone, sensory exam grossly normal, mentation intact and speech normal  PSYCH: mentation appears normal and affect normal/bright    Diagnostic Test Results:  Labs reviewed in Epic    ASSESSMENT/PLAN:       ICD-10-CM    1. Routine history and physical examination of adult Z00.00    2. Visit for screening mammogram Z12.31 MA Screening Digital Bilateral   3. Type 2 diabetes mellitus without complication, without long-term current use of insulin (H) E11.9 Hemoglobin A1c     Albumin Random Urine Quantitative with Creat Ratio     Lipid panel reflex to direct LDL Fasting     Basic metabolic panel     C FOOT EXAM  NO CHARGE   4. Screening for colon cancer Z12.11 Fecal colorectal cancer screen FIT   5. Acute left-sided low back pain without sciatica M54.5 ORTHO  REFERRAL     Discussed likely bulging disc as her back goes out about every 8 months.  Recommend follow up with ortho, she will think about this and also try getting massages to see if that helps.  Referral provided.    Agrees to fit and mammogram.  Declines flu shot.    COUNSELING:  Reviewed preventive health counseling, as reflected in patient instructions    Estimated body mass index is  "30.55 kg/m  as calculated from the following:    Height as of this encounter: 1.556 m (5' 1.25\").    Weight as of this encounter: 73.9 kg (163 lb).    Weight management plan: Discussed healthy diet and exercise guidelines     reports that she has been smoking cigarettes. She has a 11.25 pack-year smoking history. She has never used smokeless tobacco.  Tobacco Cessation Action Plan: Information offered: Patient not interested at this time    Counseling Resources:  ATP IV Guidelines  Pooled Cohorts Equation Calculator  Breast Cancer Risk Calculator  FRAX Risk Assessment  ICSI Preventive Guidelines  Dietary Guidelines for Americans, 2010  USDA's MyPlate  ASA Prophylaxis  Lung CA Screening    Jinny Raya PA-C  [facility]  "

## 2019-12-06 LAB
CREAT UR-MCNC: 81 MG/DL
MICROALBUMIN UR-MCNC: 6 MG/L
MICROALBUMIN/CREAT UR: 7.79 MG/G CR (ref 0–25)

## 2019-12-06 NOTE — RESULT ENCOUNTER NOTE
vYes Thomas,    I just wanted to let you know that your lab results have been reviewed and are attached.    - Your lab results look great; everything is normal.  You are due for an exam, if you could have them send us a copy of the visit or scan it into [employer], that would be great.  Thanks!    Please let me know if you have any questions and have a great week!    Sincerely,    Celina Raya PA-C    [facility]  [address]  [place] [address]  [phone] (p)

## 2019-12-08 LAB — HEMOCCULT STL QL IA: NEGATIVE

## 2019-12-09 ENCOUNTER — OFFICE VISIT (OUTPATIENT)
Dept: ORTHOPEDICS | Facility: CLINIC | Age: 59
End: 2019-12-09
Payer: COMMERCIAL

## 2019-12-09 ENCOUNTER — ANCILLARY PROCEDURE (OUTPATIENT)
Dept: GENERAL RADIOLOGY | Facility: CLINIC | Age: 59
End: 2019-12-09
Attending: FAMILY MEDICINE
Payer: COMMERCIAL

## 2019-12-09 VITALS
DIASTOLIC BLOOD PRESSURE: 100 MMHG | WEIGHT: 163 LBS | HEIGHT: 61 IN | BODY MASS INDEX: 30.78 KG/M2 | SYSTOLIC BLOOD PRESSURE: 158 MMHG

## 2019-12-09 DIAGNOSIS — M54.50 CHRONIC LOW BACK PAIN WITHOUT SCIATICA, UNSPECIFIED BACK PAIN LATERALITY: Primary | ICD-10-CM

## 2019-12-09 DIAGNOSIS — M51.379 DDD (DEGENERATIVE DISC DISEASE), LUMBOSACRAL: ICD-10-CM

## 2019-12-09 DIAGNOSIS — G89.29 CHRONIC LOW BACK PAIN WITHOUT SCIATICA, UNSPECIFIED BACK PAIN LATERALITY: ICD-10-CM

## 2019-12-09 DIAGNOSIS — G89.29 CHRONIC LOW BACK PAIN WITHOUT SCIATICA, UNSPECIFIED BACK PAIN LATERALITY: Primary | ICD-10-CM

## 2019-12-09 DIAGNOSIS — M54.50 CHRONIC LOW BACK PAIN WITHOUT SCIATICA, UNSPECIFIED BACK PAIN LATERALITY: ICD-10-CM

## 2019-12-09 PROCEDURE — 99204 OFFICE O/P NEW MOD 45 MIN: CPT | Performed by: FAMILY MEDICINE

## 2019-12-09 PROCEDURE — 72100 X-RAY EXAM L-S SPINE 2/3 VWS: CPT

## 2019-12-09 ASSESSMENT — ENCOUNTER SYMPTOMS
TINGLING: 0
FOCAL WEAKNESS: 0
BACK PAIN: 1
SENSORY CHANGE: 0

## 2019-12-09 ASSESSMENT — MIFFLIN-ST. JEOR: SCORE: 1245.7

## 2019-12-09 NOTE — LETTER
"    12/9/2019         RE: Martha Neal  [address]  [place] [address]        Dear Colleague,    Thank you for referring your patient, Martha Neal, to the  SPORTS MEDICINE. Please see a copy of my visit note below.    [facility]   Clinic Visit s Dec 9, 2019    PCP: Jinny Raya      Martha is a 59 year old female who is seen as self referral for   Chief Complaint   Patient presents with     Lower Back - Pain       Injury: chronic LBP worse in the last 1.5 years, last episode Oct 2019.       Location of Pain: left low back   Duration of Pain: acute on chronic, 2 month(s), \"most of her adult life\"  Rating of Pain at worst: 9/10  Rating of Pain Currently: 2/10  Pain is better with: activity avoidance   Pain is worse with: sitting to standing and standing  Treatment so far consists of: heat, ice and Pain medication: cyclobenzaprine (FLEXERIL), HYDROcodone-acetaminophen (NORCO/VICODIN)  (from Feb) and methylPREDNISolone (MEDROL DOSEPAK) (oct 19), home exercises  Associated symptoms: no distal numbness or tingling; denies swelling or warmth  Recent imaging completed: No recent imaging completed.  Prior History of related problems: none    No PHYSICAL THERAPY      Social History: retired     Past Medical History:   Diagnosis Date     Abnormal fasting glucose 12/14/2015    A1C      6.3   12/16/2015.  Pt is going to cut out soda and work on increasing exercise.        Patient Active Problem List    Diagnosis Date Noted     Paroxysmal supraventricular tachycardia (H) 09/24/2018     Priority: Medium     <1% of the time, longest episode 15 beats, no tx needed unless pt starts having more frequent or bothersome symptoms.       Family history of BRCA gene positive 08/16/2018     Priority: Medium     Sister, does not have breast cancer, father had breast cancer, unsure if had gene.  Pt does not want genetic testing, will just get yearly mammograms       Mixed " hyperlipidemia 08/15/2018     Priority: Medium     Type 2 diabetes mellitus without complication, without long-term current use of insulin (H) 03/22/2018     Priority: Medium     Right-sided low back pain without sciatica, unspecified chronicity 06/27/2017     Priority: Medium     Tobacco abuse 12/10/2015     Priority: Medium     Primary osteoarthritis of left ankle 12/10/2015     Priority: Medium       Family History   Problem Relation Age of Onset     Cancer Mother      Cardiovascular Father      Breast Cancer Father      Cerebrovascular Disease Father      Other - See Comments Sister      Diabetes No family hx of      Coronary Artery Disease No family hx of      Colon Cancer No family hx of      Prostate Cancer No family hx of      Depression No family hx of      Anxiety Disorder No family hx of      Mental Illness No family hx of      Substance Abuse No family hx of      Anesthesia Reaction No family hx of      Asthma No family hx of      Osteoporosis No family hx of      Genetic Disorder No family hx of      Thyroid Disease No family hx of        Social History     Socioeconomic History     Marital status: Single     Spouse name: Not on file     Number of children: Not on file     Years of education: Not on file     Highest education level: Not on file   Occupational History     Not on file   Social Needs     Financial resource strain: Not on file     Food insecurity:     Worry: Not on file     Inability: Not on file     Transportation needs:     Medical: Not on file     Non-medical: Not on file   Tobacco Use     Smoking status: Current Every Day Smoker     Packs/day: 0.25     Years: 45.00     Pack years: 11.25     Types: Cigarettes     Smokeless tobacco: Never Used   Substance and Sexual Activity     Alcohol use: No     Drug use: No       Past Surgical History:   Procedure Laterality Date     HYSTERECTOMY  2002     HYSTERECTOMY, PAP NO LONGER INDICATED               Review of Systems   Musculoskeletal:  "Positive for back pain.   Neurological: Negative for tingling, sensory change and focal weakness.   All other systems reviewed and are negative.        Physical Exam  BP (!) 158/100   Ht 1.556 m (5' 1.25\")   Wt 73.9 kg (163 lb)   LMP  (LMP Unknown)   BMI 30.55 kg/m     Constitutional:well-developed, well-nourished, and in no distress.   Cardiovascular: Intact distal pulses.    Neurological: alert. Gait Normal:   Gait, station, stance, and balance appear normal for age  Skin: Skin is warm and dry.   Psychiatric: Mood and affect normal.   Respiratory: unlabored, speaks in full sentences  Lymph: no LAD, no lymphangitis            Back Exam     Tenderness   The patient is experiencing tenderness in the sacroiliac.    Range of Motion   Extension: normal   Flexion: normal   Lateral bend right: normal   Lateral bend left: normal   Rotation right: normal   Rotation left: normal     Muscle Strength   The patient has normal back strength.    Tests   Straight leg raise right: negative  Straight leg raise left: negative    Reflexes   Patellar: normal  Achilles: normal    Other   Toe walk: normal  Heel walk: normal  Sensation: normal  Gait: normal   Erythema: no back redness  Scars: absent    Comments:  Normal range of motion, but pain with flexion and extension.    Tenderness over left SI area only.          X-ray images Ordered and independently reviewed by me in the office today with the patient. X-ray shows:     Recent Results (from the past 744 hour(s))   XR Lumbar Spine 2/3 Views    Narrative    LUMBAR SPINE TWO TO THREE VIEWS   12/9/2019 2:29 PM     HISTORY: Chronic low back pain without sciatica, unspecified back pain  laterality.     COMPARISON: None.      Impression    IMPRESSION: Mild-to-moderate multilevel degenerative disc and facet  disease is present. There is no evidence for fracture or any posterior  malalignment.    CL CURRAN MD     On my review, I do believe she has 6 lumbar " vertebrae.    ASSESSMENT/PLAN    ICD-10-CM    1. Chronic low back pain without sciatica, unspecified back pain laterality M54.5 XR Lumbar Spine 2/3 Views    G89.29 [facility]   2. DDD (degenerative disc disease), lumbosacral M51.37 [facility]     Reassurance regarding 6 lumbar vertebrae, not directly related to any pain syndromes.    She does have mild degenerative disc disease on radiographs.  No signs of radiculopathy today.  Medications have been helpful, will send for physical therapy for long-term maintenance strengthening stabilization.      Again, thank you for allowing me to participate in the care of your patient.        Sincerely,        Cristofer Ponce MD

## 2019-12-09 NOTE — PROGRESS NOTES
"[facility]   Clinic Visit s Dec 9, 2019    PCP: Jinny Rayaalbert Thomas is a 59 year old female who is seen as self referral for   Chief Complaint   Patient presents with     Lower Back - Pain       Injury: chronic LBP worse in the last 1.5 years, last episode Oct 2019.       Location of Pain: left low back   Duration of Pain: acute on chronic, 2 month(s), \"most of her adult life\"  Rating of Pain at worst: 9/10  Rating of Pain Currently: 2/10  Pain is better with: activity avoidance   Pain is worse with: sitting to standing and standing  Treatment so far consists of: heat, ice and Pain medication: cyclobenzaprine (FLEXERIL), HYDROcodone-acetaminophen (NORCO/VICODIN)  (from Feb) and methylPREDNISolone (MEDROL DOSEPAK) (oct 19), home exercises  Associated symptoms: no distal numbness or tingling; denies swelling or warmth  Recent imaging completed: No recent imaging completed.  Prior History of related problems: none    No PHYSICAL THERAPY      Social History: retired     Past Medical History:   Diagnosis Date     Abnormal fasting glucose 12/14/2015    A1C      6.3   12/16/2015.  Pt is going to cut out soda and work on increasing exercise.        Patient Active Problem List    Diagnosis Date Noted     Paroxysmal supraventricular tachycardia (H) 09/24/2018     Priority: Medium     <1% of the time, longest episode 15 beats, no tx needed unless pt starts having more frequent or bothersome symptoms.       Family history of BRCA gene positive 08/16/2018     Priority: Medium     Sister, does not have breast cancer, father had breast cancer, unsure if had gene.  Pt does not want genetic testing, will just get yearly mammograms       Mixed hyperlipidemia 08/15/2018     Priority: Medium     Type 2 diabetes mellitus without complication, without long-term current use of insulin (H) 03/22/2018     Priority: Medium     Right-sided low back pain without sciatica, unspecified " "chronicity 06/27/2017     Priority: Medium     Tobacco abuse 12/10/2015     Priority: Medium     Primary osteoarthritis of left ankle 12/10/2015     Priority: Medium       Family History   Problem Relation Age of Onset     Cancer Mother      Cardiovascular Father      Breast Cancer Father      Cerebrovascular Disease Father      Other - See Comments Sister      Diabetes No family hx of      Coronary Artery Disease No family hx of      Colon Cancer No family hx of      Prostate Cancer No family hx of      Depression No family hx of      Anxiety Disorder No family hx of      Mental Illness No family hx of      Substance Abuse No family hx of      Anesthesia Reaction No family hx of      Asthma No family hx of      Osteoporosis No family hx of      Genetic Disorder No family hx of      Thyroid Disease No family hx of        Social History     Socioeconomic History     Marital status: Single     Spouse name: Not on file     Number of children: Not on file     Years of education: Not on file     Highest education level: Not on file   Occupational History     Not on file   Social Needs     Financial resource strain: Not on file     Food insecurity:     Worry: Not on file     Inability: Not on file     Transportation needs:     Medical: Not on file     Non-medical: Not on file   Tobacco Use     Smoking status: Current Every Day Smoker     Packs/day: 0.25     Years: 45.00     Pack years: 11.25     Types: Cigarettes     Smokeless tobacco: Never Used   Substance and Sexual Activity     Alcohol use: No     Drug use: No       Past Surgical History:   Procedure Laterality Date     HYSTERECTOMY  2002     HYSTERECTOMY, PAP NO LONGER INDICATED               Review of Systems   Musculoskeletal: Positive for back pain.   Neurological: Negative for tingling, sensory change and focal weakness.   All other systems reviewed and are negative.        Physical Exam  BP (!) 158/100   Ht 1.556 m (5' 1.25\")   Wt 73.9 kg (163 lb)   LMP  (LMP " Unknown)   BMI 30.55 kg/m    Constitutional:well-developed, well-nourished, and in no distress.   Cardiovascular: Intact distal pulses.    Neurological: alert. Gait Normal:   Gait, station, stance, and balance appear normal for age  Skin: Skin is warm and dry.   Psychiatric: Mood and affect normal.   Respiratory: unlabored, speaks in full sentences  Lymph: no LAD, no lymphangitis            Back Exam     Tenderness   The patient is experiencing tenderness in the sacroiliac.    Range of Motion   Extension: normal   Flexion: normal   Lateral bend right: normal   Lateral bend left: normal   Rotation right: normal   Rotation left: normal     Muscle Strength   The patient has normal back strength.    Tests   Straight leg raise right: negative  Straight leg raise left: negative    Reflexes   Patellar: normal  Achilles: normal    Other   Toe walk: normal  Heel walk: normal  Sensation: normal  Gait: normal   Erythema: no back redness  Scars: absent    Comments:  Normal range of motion, but pain with flexion and extension.    Tenderness over left SI area only.          X-ray images Ordered and independently reviewed by me in the office today with the patient. X-ray shows:     Recent Results (from the past 744 hour(s))   XR Lumbar Spine 2/3 Views    Narrative    LUMBAR SPINE TWO TO THREE VIEWS   12/9/2019 2:29 PM     HISTORY: Chronic low back pain without sciatica, unspecified back pain  laterality.     COMPARISON: None.      Impression    IMPRESSION: Mild-to-moderate multilevel degenerative disc and facet  disease is present. There is no evidence for fracture or any posterior  malalignment.    CL CURRAN MD     On my review, I do believe she has 6 lumbar vertebrae.    ASSESSMENT/PLAN    ICD-10-CM    1. Chronic low back pain without sciatica, unspecified back pain laterality M54.5 XR Lumbar Spine 2/3 Views    G89.29 [facility]   2. DDD (degenerative disc disease), lumbosacral M51.37 [facility]     Reassurance regarding 6 lumbar vertebrae, not directly related to any pain syndromes.    She does have mild degenerative disc disease on radiographs.  No signs of radiculopathy today.  Medications have been helpful, will send for physical therapy for long-term maintenance strengthening stabilization.

## 2019-12-17 ENCOUNTER — THERAPY VISIT (OUTPATIENT)
Dept: PHYSICAL THERAPY | Facility: CLINIC | Age: 59
End: 2019-12-17
Attending: FAMILY MEDICINE
Payer: COMMERCIAL

## 2019-12-17 DIAGNOSIS — M51.379 DDD (DEGENERATIVE DISC DISEASE), LUMBOSACRAL: ICD-10-CM

## 2019-12-17 DIAGNOSIS — M54.50 CHRONIC LOW BACK PAIN WITHOUT SCIATICA, UNSPECIFIED BACK PAIN LATERALITY: ICD-10-CM

## 2019-12-17 DIAGNOSIS — G89.29 CHRONIC LOW BACK PAIN WITHOUT SCIATICA, UNSPECIFIED BACK PAIN LATERALITY: ICD-10-CM

## 2019-12-17 DIAGNOSIS — M54.50 CHRONIC BILATERAL LOW BACK PAIN WITHOUT SCIATICA: ICD-10-CM

## 2019-12-17 DIAGNOSIS — G89.29 CHRONIC BILATERAL LOW BACK PAIN WITHOUT SCIATICA: ICD-10-CM

## 2019-12-17 PROCEDURE — 97530 THERAPEUTIC ACTIVITIES: CPT | Mod: GP | Performed by: PHYSICAL THERAPIST

## 2019-12-17 PROCEDURE — 97110 THERAPEUTIC EXERCISES: CPT | Mod: GP | Performed by: PHYSICAL THERAPIST

## 2019-12-17 PROCEDURE — 97161 PT EVAL LOW COMPLEX 20 MIN: CPT | Mod: GP | Performed by: PHYSICAL THERAPIST

## 2019-12-17 NOTE — PROGRESS NOTES
[facility] Initial Evaluation    Physical Therapy Initial Examination/Evaluation  December 17, 2019    Martha Neal  is a 59 year old  female referred to physical therapy by Dr. Ponce for treatment of LBP.      DOI/onset most recent 10-17-19  Mechanism of injury Patient has a hx of LBP episodes about 1 per year for many years. They usually last 7-10 days and go away on their own. The past 2 years she has had increased freq and severity of the episodes prompting her to be referred to PT to learn prevention exercises. She is now completely recovered from her most recent episode.  DOS n/a  Prior treatment none.     Chief Complaint:   Increasing frequency of LBP epsiodes.   Pain location: B LS area  Quality: sharp when present  Constant/Intermittent: constant when present  Symptoms have gone away now since onset.    Current pain 2/10.  Pain at best 1/10.  Pain at worst 9/10 during flareup.    Symptoms aggravated by episodes come on without known triggers.    Symptoms improved with meds and time.     Social history:  lves alone in an apt..    Patient having difficulty with ADLs: none currently.    Patient's goals are to prevent future episodes.    Patient reports general health as good.  Related medical history long hx of LBP episodes.    Surgical History:  none.    Imaging: x-rays.    Medications:  None currently.       Return to MD:  As needed.      Clinical Impression: Patient should benefit from a prevention back exercise program.    Subjective:    Martha Neal being seen for lower back pain.   Problem began 1/1/2019. Where condition occurred: at home.Problem occurred: unknown  and reported as 1/10 on pain scale. General health as reported by patient is good. Pertinent medical history includes:  Smoking.    Surgeries include:  Other. Other surgery history details: Hysterectomy.  Current medications:  None.   Primary job tasks include:  Driving and prolonged sitting.  Pain is described as  aching and is intermittent. Pain is worse in the A.M.. Since onset symptoms are gradually worsening. Special tests:  X-ray.     Patient is retired.   Barriers include:  None as reported by patient.                        Objective:  Standing Alignment:        Lumbar:  Normal  Pelvic:  Iliac crest high R          Gait:    Gait Type:  Normal         Flexibility/Screens:   Positive screens:  Lumbar      Spine:  Decreased left spine flexibility:  Quadratus Lumborum    Decreased right spine flexibility:  Quadratus Lumborum             Lumbar/SI Evaluation  ROM:  AROM Lumbar: normal    Strength: lower abdominals 4-/5; extensors 4/5  Lumbar Myotomes:  normal                Lumbar Dermtomes:  normal                Neural Tension/Mobility:      Left side:SLR or Slump  negative.     Right side:   Slump or SLR  negative.   Lumbar Palpation:    Tenderness present at Left:    Quadratus Lumborum and Erector Spinae  Tenderness present at Right: Quadratus Lumborum and Erector Spinae      Spinal Segmental Conclusions: No pain with P/A glide lumbar spine  Normal segmental mobility lumbar spine          SI joint/Sacrum:    SI joint provocation tests (-)                                                       General     ROS    Assessment/Plan:    Patient is a 59 year old female with lumbar complaints.    Patient has the following significant findings with corresponding treatment plan.                Diagnosis 1:  LBP  Pain -  self management and education  Decreased strength - therapeutic exercise and therapeutic activities  Impaired muscle performance - neuro re-education  Decreased function - therapeutic activities    Therapy Evaluation Codes:   1) History comprised of:   Personal factors that impact the plan of care:      Time since onset of symptoms.    Comorbidity factors that impact the plan of care are:      None.     Medications impacting care: None.  2) Examination of Body Systems comprised of:   Body structures and functions  that impact the plan of care:      Lumbar spine.   Activity limitations that impact the plan of care are:      Lifting.  3) Clinical presentation characteristics are:   Stable/Uncomplicated.  4) Decision-Making    Low complexity using standardized patient assessment instrument and/or measureable assessment of functional outcome.  Cumulative Therapy Evaluation is: Low complexity.    Previous and current functional limitations:  (See Goal Flow Sheet for this information)    Short term and Long term goals: (See Goal Flow Sheet for this information)     Communication ability:  Patient appears to be able to clearly communicate and understand verbal and written communication and follow directions correctly.  Treatment Explanation - The following has been discussed with the patient:   RX ordered/plan of care  Anticipated outcomes  Possible risks and side effects  This patient would benefit from PT intervention to resume normal activities.   Rehab potential is good.    Frequency:  2 X a month, once daily  Duration:  for 1 month  Discharge Plan:  Achieve all LTG.  Independent in home treatment program.  Reach maximal therapeutic benefit.    Please refer to the daily flowsheet for treatment today, total treatment time and time spent performing 1:1 timed codes.

## 2019-12-30 ENCOUNTER — THERAPY VISIT (OUTPATIENT)
Dept: PHYSICAL THERAPY | Facility: CLINIC | Age: 59
End: 2019-12-30
Attending: FAMILY MEDICINE
Payer: COMMERCIAL

## 2019-12-30 DIAGNOSIS — M54.50 CHRONIC BILATERAL LOW BACK PAIN WITHOUT SCIATICA: ICD-10-CM

## 2019-12-30 DIAGNOSIS — G89.29 CHRONIC BILATERAL LOW BACK PAIN WITHOUT SCIATICA: ICD-10-CM

## 2019-12-30 PROCEDURE — 97110 THERAPEUTIC EXERCISES: CPT | Mod: GP | Performed by: PHYSICAL THERAPIST

## 2019-12-30 PROCEDURE — 97530 THERAPEUTIC ACTIVITIES: CPT | Mod: GP | Performed by: PHYSICAL THERAPIST

## 2019-12-30 NOTE — PROGRESS NOTES
Subjective:  HPI                    Objective:  System    Physical Exam    General     ROS    Assessment/Plan:    DISCHARGE REPORT    Progress reporting period is from 12-17-19 to 12-30-19.       SUBJECTIVE  Subjective changes noted by patient:   Pt. has made good progress in a short stint of PT for recurrent LBP. She currently has no c/o pain and has resumed all of her normal activities without difficulty. Pt. hopes to prevent further episodes with a regular home exercise program.       Current pain level is  0/10.     Previous pain level was  2/10(9/10 during flareup).   Changes in function:  Yes (See Goal flowsheet attached for changes in current functional level)  Adverse reaction to treatment or activity: None    OBJECTIVE  Changes noted in objective findings:  ROM lumbar movements WNL. Strength - lower abdominals 4/5; extensors 4/5     ASSESSMENT/PLAN  Updated problem list and treatment plan: Diagnosis 1:  LBP  Pain -  self management and education  Decreased strength - therapeutic exercise and therapeutic activities  Impaired muscle performance - neuro re-education  Decreased function - therapeutic activities  STG/LTGs have been met or progress has been made towards goals:  Yes (See Goal flow sheet completed today.)  Assessment of Progress: Patient is meeting short term goals and is progressing towards long term goals.  Self Management Plans:  Patient is independent in a home treatment program.  Patient is independent in self management of symptoms.  I have re-evaluated this patient and find that the nature, scope, duration and intensity of the therapy is appropriate for the medical condition of the patient.  Martha continues to require the following intervention to meet STG and LTG's:  PT intervention is no longer required to meet STG/LTG.    Recommendations:  This patient is ready to be discharged from therapy and continue their home treatment program.    Please refer to the daily flowsheet for treatment  today, total treatment time and time spent performing 1:1 timed codes.

## 2020-01-17 ENCOUNTER — ANCILLARY PROCEDURE (OUTPATIENT)
Dept: MAMMOGRAPHY | Facility: CLINIC | Age: 60
End: 2020-01-17
Attending: PHYSICIAN ASSISTANT
Payer: COMMERCIAL

## 2020-01-17 DIAGNOSIS — Z12.31 VISIT FOR SCREENING MAMMOGRAM: ICD-10-CM

## 2020-01-17 PROCEDURE — 77067 SCR MAMMO BI INCL CAD: CPT | Mod: TC

## 2020-03-26 PROBLEM — M54.50 CHRONIC BILATERAL LOW BACK PAIN WITHOUT SCIATICA: Status: RESOLVED | Noted: 2019-12-17 | Resolved: 2020-03-26

## 2020-03-26 PROBLEM — G89.29 CHRONIC BILATERAL LOW BACK PAIN WITHOUT SCIATICA: Status: RESOLVED | Noted: 2019-12-17 | Resolved: 2020-03-26

## 2020-11-10 ENCOUNTER — TELEPHONE (OUTPATIENT)
Dept: FAMILY MEDICINE | Facility: CLINIC | Age: 60
End: 2020-11-10

## 2020-11-10 NOTE — TELEPHONE ENCOUNTER
----- Message from Jinny Raya PA-C sent at 11/10/2020 10:42 AM CST -----  Please contact pt.  They are due for Diabetes Check.

## 2020-11-10 NOTE — TELEPHONE ENCOUNTER
FYI: Pt is going to transfer her care to [facility]. She said she will call and set up an appt soon.

## 2020-11-19 ENCOUNTER — OFFICE VISIT (OUTPATIENT)
Dept: FAMILY MEDICINE | Facility: CLINIC | Age: 60
End: 2020-11-19
Payer: COMMERCIAL

## 2020-11-19 DIAGNOSIS — H61.23 BILATERAL IMPACTED CERUMEN: ICD-10-CM

## 2020-11-19 DIAGNOSIS — L30.9 DERMATITIS: Primary | ICD-10-CM

## 2020-11-19 PROCEDURE — 99213 OFFICE O/P EST LOW 20 MIN: CPT | Mod: 25 | Performed by: PHYSICIAN ASSISTANT

## 2020-11-19 PROCEDURE — 69210 REMOVE IMPACTED EAR WAX UNI: CPT | Mod: 50 | Performed by: PHYSICIAN ASSISTANT

## 2020-11-19 RX ORDER — TRIAMCINOLONE ACETONIDE 1 MG/G
CREAM TOPICAL
Qty: 30 G | Refills: 1 | Status: SHIPPED | OUTPATIENT
Start: 2020-11-19 | End: 2021-12-15

## 2020-11-20 VITALS
OXYGEN SATURATION: 99 % | WEIGHT: 165 LBS | DIASTOLIC BLOOD PRESSURE: 84 MMHG | TEMPERATURE: 98 F | HEART RATE: 104 BPM | SYSTOLIC BLOOD PRESSURE: 124 MMHG | BODY MASS INDEX: 30.92 KG/M2

## 2020-11-20 NOTE — PROGRESS NOTES
Subjective     Martha Neal is a 60 year old female who presents to clinic today for the following health issues:    HPI   Rash  Onset/Duration: 2 months    Description  Location: left forearm  Character: round, blotchy, flakey, red  Itching: moderate  Intensity:  mild  Progression of Symptoms:  same  Accompanying signs and symptoms:   Fever: no  Body aches or joint pain: no  Sore throat symptoms: no  Recent cold symptoms: no  History:           Previous episodes of similar rash: None  New exposures:  None  Recent travel: no  Exposure to similar rash: no  Precipitating or alleviating factors: none  Therapies tried and outcome: none        Reviewed and updated as needed this visit by Provider  Tobacco  Allergies  Meds  Problems  Med Hx  Surg Hx  Fam Hx          Additional complaints: Ears plugged    HPI additional notes: Martha presents today with   Chief Complaint   Patient presents with     Derm Problem   No vesicles, not painful.  No systemic symptoms         Review of Systems   Constitutional, HEENT, cardiovascular, pulmonary, gi and gu systems are negative, except as otherwise noted.        Objective    LMP  (LMP Unknown)   There is no height or weight on file to calculate BMI.  Physical Exam   GENERAL: healthy, alert, in no acute distress  SKIN:    Location: arms     Distribution: localized and patchy     Lesion type: patches     Color: red  PSYCH: Alert and oriented times 3;  Able to articulate logical thoughts. Affect is normal.    Diagnostic test results: none         Assessment & Plan         ICD-10-CM    1. Dermatitis  L30.9 triamcinolone (KENALOG) 0.1 % external cream     If not improving in 2 weeks will refer to dermatology for further evaluation.    Cerumen is noted via otoscopic examination in the bilateral external ear causing moderate obstruction with impaction.  Removal deemed medically necessary due loss of hearing secondary to obstruction.  Cerumen was removed by provider with syringing  and manual debridement with wax curette. Instructions for home care to prevent wax buildup are given. Pt tolerated procedure well without complication.         Please see patient instructions for treatment details.    Return in about 2 weeks (around 12/3/2020) for phone call to clinic, Recheck if not improving.    Jinny Raya PA-C  [facility]

## 2020-11-22 ENCOUNTER — HEALTH MAINTENANCE LETTER (OUTPATIENT)
Age: 60
End: 2020-11-22

## 2021-01-15 ENCOUNTER — HEALTH MAINTENANCE LETTER (OUTPATIENT)
Age: 61
End: 2021-01-15

## 2021-02-24 ENCOUNTER — TELEPHONE (OUTPATIENT)
Dept: FAMILY MEDICINE | Facility: CLINIC | Age: 61
End: 2021-02-24

## 2021-02-24 NOTE — TELEPHONE ENCOUNTER
Jinny Raya PA-C  P L[facility] Sb2/3/4 [facility] ([facility])             Please contact pt.  They are due for Diabetes Check Appointment, wants to establish care with a provider near her home.

## 2021-04-04 ENCOUNTER — HEALTH MAINTENANCE LETTER (OUTPATIENT)
Age: 61
End: 2021-04-04

## 2021-04-06 ENCOUNTER — IMMUNIZATION (OUTPATIENT)
Dept: LAB | Facility: CLINIC | Age: 61
End: 2021-04-06
Payer: COMMERCIAL

## 2021-04-28 ENCOUNTER — TELEPHONE (OUTPATIENT)
Dept: FAMILY MEDICINE | Facility: CLINIC | Age: 61
End: 2021-04-28

## 2021-04-28 DIAGNOSIS — E11.9 TYPE 2 DIABETES MELLITUS WITHOUT COMPLICATION, WITHOUT LONG-TERM CURRENT USE OF INSULIN (H): Primary | ICD-10-CM

## 2021-04-28 NOTE — TELEPHONE ENCOUNTER
Left message for patient to call back. Please assist in scheduling lab only appt. Can be done at any [place] lab.    Dariana Hdez,           Jinny Raya PA-C at 4/28/2021  7:54 AM    Status: Signed      Due for lab only appointment, this can be done at any [place] lab.  Orders already placed.

## 2021-05-30 ENCOUNTER — HEALTH MAINTENANCE LETTER (OUTPATIENT)
Age: 61
End: 2021-05-30

## 2021-09-19 ENCOUNTER — HEALTH MAINTENANCE LETTER (OUTPATIENT)
Age: 61
End: 2021-09-19

## 2021-12-15 ENCOUNTER — HOSPITAL ENCOUNTER (INPATIENT)
Facility: CLINIC | Age: 61
LOS: 1 days | Discharge: HOME | End: 2021-12-16
Attending: EMERGENCY MEDICINE | Admitting: STUDENT IN AN ORGANIZED HEALTH CARE EDUCATION/TRAINING PROGRAM
Payer: COMMERCIAL

## 2021-12-15 ENCOUNTER — APPOINTMENT (OUTPATIENT)
Dept: GENERAL RADIOLOGY | Facility: CLINIC | Age: 61
End: 2021-12-15
Attending: EMERGENCY MEDICINE
Payer: COMMERCIAL

## 2021-12-15 DIAGNOSIS — J96.01 ACUTE RESPIRATORY FAILURE WITH HYPOXIA (H): ICD-10-CM

## 2021-12-15 DIAGNOSIS — J10.1 INFLUENZA A: ICD-10-CM

## 2021-12-15 LAB
ALBUMIN SERPL-MCNC: 3.4 G/DL (ref 3.4–5)
ALP SERPL-CCNC: 74 U/L (ref 40–150)
ALT SERPL W P-5'-P-CCNC: 21 U/L (ref 0–50)
ANION GAP SERPL CALCULATED.3IONS-SCNC: 3 MMOL/L (ref 3–14)
AST SERPL W P-5'-P-CCNC: 14 U/L (ref 0–45)
BASOPHILS # BLD AUTO: 0 10E3/UL (ref 0–0.2)
BASOPHILS NFR BLD AUTO: 1 %
BILIRUB SERPL-MCNC: 0.4 MG/DL (ref 0.2–1.3)
BUN SERPL-MCNC: 10 MG/DL (ref 7–30)
CALCIUM SERPL-MCNC: 9.2 MG/DL (ref 8.5–10.1)
CHLORIDE BLD-SCNC: 111 MMOL/L (ref 94–109)
CO2 SERPL-SCNC: 28 MMOL/L (ref 20–32)
CREAT SERPL-MCNC: 0.74 MG/DL (ref 0.52–1.04)
EOSINOPHIL # BLD AUTO: 0 10E3/UL (ref 0–0.7)
EOSINOPHIL NFR BLD AUTO: 1 %
ERYTHROCYTE [DISTWIDTH] IN BLOOD BY AUTOMATED COUNT: 13.7 % (ref 10–15)
FLUAV RNA SPEC QL NAA+PROBE: POSITIVE
FLUBV RNA RESP QL NAA+PROBE: NEGATIVE
GFR SERPL CREATININE-BSD FRML MDRD: 86 ML/MIN/1.73M2
GLUCOSE BLD-MCNC: 170 MG/DL (ref 70–99)
HCO3 BLDV-SCNC: 27 MMOL/L (ref 21–28)
HCT VFR BLD AUTO: 42.5 % (ref 35–47)
HGB BLD-MCNC: 13.9 G/DL (ref 11.7–15.7)
IMM GRANULOCYTES # BLD: 0 10E3/UL
IMM GRANULOCYTES NFR BLD: 0 %
LACTATE BLD-SCNC: 0.8 MMOL/L
LYMPHOCYTES # BLD AUTO: 2.2 10E3/UL (ref 0.8–5.3)
LYMPHOCYTES NFR BLD AUTO: 54 %
MCH RBC QN AUTO: 30.1 PG (ref 26.5–33)
MCHC RBC AUTO-ENTMCNC: 32.7 G/DL (ref 31.5–36.5)
MCV RBC AUTO: 92 FL (ref 78–100)
MONOCYTES # BLD AUTO: 0.4 10E3/UL (ref 0–1.3)
MONOCYTES NFR BLD AUTO: 10 %
NEUTROPHILS # BLD AUTO: 1.4 10E3/UL (ref 1.6–8.3)
NEUTROPHILS NFR BLD AUTO: 34 %
NRBC # BLD AUTO: 0 10E3/UL
NRBC BLD AUTO-RTO: 0 /100
NT-PROBNP SERPL-MCNC: 121 PG/ML (ref 0–900)
PCO2 BLDV: 42 MM HG (ref 40–50)
PH BLDV: 7.41 [PH] (ref 7.32–7.43)
PLAT MORPH BLD: NORMAL
PLATELET # BLD AUTO: 191 10E3/UL (ref 150–450)
PO2 BLDV: 45 MM HG (ref 25–47)
POTASSIUM BLD-SCNC: 3.8 MMOL/L (ref 3.4–5.3)
PROCALCITONIN SERPL-MCNC: <0.05 NG/ML
PROT SERPL-MCNC: 7.1 G/DL (ref 6.8–8.8)
RBC # BLD AUTO: 4.62 10E6/UL (ref 3.8–5.2)
RBC MORPH BLD: NORMAL
SAO2 % BLDV: 81 % (ref 94–100)
SARS-COV-2 RNA RESP QL NAA+PROBE: NEGATIVE
SODIUM SERPL-SCNC: 142 MMOL/L (ref 133–144)
TROPONIN I SERPL HS-MCNC: 9 NG/L
WBC # BLD AUTO: 4 10E3/UL (ref 4–11)

## 2021-12-15 PROCEDURE — 80053 COMPREHEN METABOLIC PANEL: CPT | Performed by: EMERGENCY MEDICINE

## 2021-12-15 PROCEDURE — 83880 ASSAY OF NATRIURETIC PEPTIDE: CPT | Performed by: EMERGENCY MEDICINE

## 2021-12-15 PROCEDURE — C9803 HOPD COVID-19 SPEC COLLECT: HCPCS

## 2021-12-15 PROCEDURE — 250N000013 HC RX MED GY IP 250 OP 250 PS 637: Performed by: STUDENT IN AN ORGANIZED HEALTH CARE EDUCATION/TRAINING PROGRAM

## 2021-12-15 PROCEDURE — 99223 1ST HOSP IP/OBS HIGH 75: CPT | Mod: AI | Performed by: STUDENT IN AN ORGANIZED HEALTH CARE EDUCATION/TRAINING PROGRAM

## 2021-12-15 PROCEDURE — 71045 X-RAY EXAM CHEST 1 VIEW: CPT

## 2021-12-15 PROCEDURE — 85025 COMPLETE CBC W/AUTO DIFF WBC: CPT | Performed by: EMERGENCY MEDICINE

## 2021-12-15 PROCEDURE — 87636 SARSCOV2 & INF A&B AMP PRB: CPT | Performed by: EMERGENCY MEDICINE

## 2021-12-15 PROCEDURE — 84484 ASSAY OF TROPONIN QUANT: CPT | Performed by: EMERGENCY MEDICINE

## 2021-12-15 PROCEDURE — 99285 EMERGENCY DEPT VISIT HI MDM: CPT | Mod: 25

## 2021-12-15 PROCEDURE — 36415 COLL VENOUS BLD VENIPUNCTURE: CPT | Performed by: EMERGENCY MEDICINE

## 2021-12-15 PROCEDURE — 250N000009 HC RX 250: Performed by: STUDENT IN AN ORGANIZED HEALTH CARE EDUCATION/TRAINING PROGRAM

## 2021-12-15 PROCEDURE — 99207 PR CDG-MDM COMPONENT: MEETS HIGH - UP CODED: CPT | Performed by: STUDENT IN AN ORGANIZED HEALTH CARE EDUCATION/TRAINING PROGRAM

## 2021-12-15 PROCEDURE — 250N000011 HC RX IP 250 OP 636: Performed by: STUDENT IN AN ORGANIZED HEALTH CARE EDUCATION/TRAINING PROGRAM

## 2021-12-15 PROCEDURE — 94640 AIRWAY INHALATION TREATMENT: CPT | Mod: 76

## 2021-12-15 PROCEDURE — 999N000157 HC STATISTIC RCP TIME EA 10 MIN

## 2021-12-15 PROCEDURE — 94640 AIRWAY INHALATION TREATMENT: CPT

## 2021-12-15 PROCEDURE — 250N000013 HC RX MED GY IP 250 OP 250 PS 637: Performed by: EMERGENCY MEDICINE

## 2021-12-15 PROCEDURE — 120N000001 HC R&B MED SURG/OB

## 2021-12-15 PROCEDURE — 83605 ASSAY OF LACTIC ACID: CPT

## 2021-12-15 PROCEDURE — 84145 PROCALCITONIN (PCT): CPT | Performed by: STUDENT IN AN ORGANIZED HEALTH CARE EDUCATION/TRAINING PROGRAM

## 2021-12-15 RX ORDER — ASCORBIC ACID 500 MG
500 TABLET ORAL DAILY
COMMUNITY

## 2021-12-15 RX ORDER — ONDANSETRON 2 MG/ML
4 INJECTION INTRAMUSCULAR; INTRAVENOUS EVERY 6 HOURS PRN
Status: DISCONTINUED | OUTPATIENT
Start: 2021-12-15 | End: 2021-12-16 | Stop reason: HOSPADM

## 2021-12-15 RX ORDER — OSELTAMIVIR PHOSPHATE 75 MG/1
75 CAPSULE ORAL 2 TIMES DAILY
Status: DISCONTINUED | OUTPATIENT
Start: 2021-12-15 | End: 2021-12-16 | Stop reason: HOSPADM

## 2021-12-15 RX ORDER — ACETAMINOPHEN 650 MG/1
650 SUPPOSITORY RECTAL EVERY 6 HOURS PRN
Status: DISCONTINUED | OUTPATIENT
Start: 2021-12-15 | End: 2021-12-16 | Stop reason: HOSPADM

## 2021-12-15 RX ORDER — GUAIFENESIN/DEXTROMETHORPHAN 100-10MG/5
10 SYRUP ORAL EVERY 4 HOURS PRN
Status: DISCONTINUED | OUTPATIENT
Start: 2021-12-15 | End: 2021-12-16 | Stop reason: HOSPADM

## 2021-12-15 RX ORDER — ACETAMINOPHEN 325 MG/1
650 TABLET ORAL EVERY 6 HOURS PRN
Status: DISCONTINUED | OUTPATIENT
Start: 2021-12-15 | End: 2021-12-16 | Stop reason: HOSPADM

## 2021-12-15 RX ORDER — LIDOCAINE 40 MG/G
CREAM TOPICAL
Status: DISCONTINUED | OUTPATIENT
Start: 2021-12-15 | End: 2021-12-16 | Stop reason: HOSPADM

## 2021-12-15 RX ORDER — OSELTAMIVIR PHOSPHATE 75 MG/1
75 CAPSULE ORAL ONCE
Status: COMPLETED | OUTPATIENT
Start: 2021-12-15 | End: 2021-12-15

## 2021-12-15 RX ORDER — ONDANSETRON 4 MG/1
4 TABLET, ORALLY DISINTEGRATING ORAL EVERY 6 HOURS PRN
Status: DISCONTINUED | OUTPATIENT
Start: 2021-12-15 | End: 2021-12-16 | Stop reason: HOSPADM

## 2021-12-15 RX ORDER — IPRATROPIUM BROMIDE AND ALBUTEROL SULFATE 2.5; .5 MG/3ML; MG/3ML
3 SOLUTION RESPIRATORY (INHALATION)
Status: DISCONTINUED | OUTPATIENT
Start: 2021-12-15 | End: 2021-12-16 | Stop reason: HOSPADM

## 2021-12-15 RX ORDER — NICOTINE 21 MG/24HR
1 PATCH, TRANSDERMAL 24 HOURS TRANSDERMAL ONCE
Status: COMPLETED | OUTPATIENT
Start: 2021-12-15 | End: 2021-12-16

## 2021-12-15 RX ADMIN — OSELTAMIVIR PHOSPHATE 75 MG: 75 CAPSULE ORAL at 11:17

## 2021-12-15 RX ADMIN — IPRATROPIUM BROMIDE AND ALBUTEROL SULFATE 3 ML: .5; 3 SOLUTION RESPIRATORY (INHALATION) at 20:44

## 2021-12-15 RX ADMIN — OSELTAMIVIR PHOSPHATE 75 MG: 75 CAPSULE ORAL at 21:41

## 2021-12-15 RX ADMIN — NICOTINE 1 PATCH: 21 PATCH, EXTENDED RELEASE TRANSDERMAL at 11:17

## 2021-12-15 RX ADMIN — IPRATROPIUM BROMIDE AND ALBUTEROL SULFATE 3 ML: .5; 3 SOLUTION RESPIRATORY (INHALATION) at 16:05

## 2021-12-15 RX ADMIN — ENOXAPARIN SODIUM 40 MG: 40 INJECTION SUBCUTANEOUS at 13:51

## 2021-12-15 ASSESSMENT — ACTIVITIES OF DAILY LIVING (ADL)
CONCENTRATING,_REMEMBERING_OR_MAKING_DECISIONS_DIFFICULTY: NO
WALKING_OR_CLIMBING_STAIRS_DIFFICULTY: NO
ADLS_ACUITY_SCORE: 5
FALL_HISTORY_WITHIN_LAST_SIX_MONTHS: NO
WEAR_GLASSES_OR_BLIND: NO
ADLS_ACUITY_SCORE: 5
ADLS_ACUITY_SCORE: 3
ADLS_ACUITY_SCORE: 3
ADLS_ACUITY_SCORE: 5
DIFFICULTY_COMMUNICATING: NO
TOILETING_ISSUES: NO
DIFFICULTY_EATING/SWALLOWING: NO
ADLS_ACUITY_SCORE: 5
ADLS_ACUITY_SCORE: 3
ADLS_ACUITY_SCORE: 8
ADLS_ACUITY_SCORE: 5
DRESSING/BATHING_DIFFICULTY: NO
HEARING_DIFFICULTY_OR_DEAF: NO
ADLS_ACUITY_SCORE: 5
ADLS_ACUITY_SCORE: 8
ADLS_ACUITY_SCORE: 3
ADLS_ACUITY_SCORE: 5
DOING_ERRANDS_INDEPENDENTLY_DIFFICULTY: NO

## 2021-12-15 NOTE — ED PROVIDER NOTES
History   Chief Complaint:  Shortness of Breath    HPI   Martha Neal is a 61 year old female who presents with concerns for shortness of breath.  The patient describes coming down with what she thought was influenza 1 week ago.  She had a dinner party with fellow family members on Monday, and then on Wednesday evening, she had fairly sudden onset of diffuse chills, body aches, and upper respiratory symptoms.  These lingered for the following 4 to 5 days where she felt that she likely had a temperature though she did not check 1.  She had a mild cough throughout this, but never felt short of breath.  She denies any ongoing body aches or fevers in the last 3 days.  However, she has now noted that she feels more short of breath with exertion.  She found it difficult yesterday to climb her stairs, having to stop because her heart rate picked up and she felt very short of breath but this improved with rest.  She then awoke this morning to use the bathroom, and became short of breath simply walking to and from the bathroom.  She called EMS, and they found her oxygen levels to be 88% on room air, improved with supplemental oxygen.  She finds that her cough has become more productive.  However, she denies any new pain in her chest.  She denies any swelling in her legs.  She denies current fevers.  While she has not been tested for any illnesses during this week, her family members did get tested for Covid and they were told they were not infected with this virus.  She is fully vaccinated against Covid.  She denies other complaints at this juncture    Review of Systems  Pertinent positives and negatives as above.  A 14-point review of systems was performed with all other systems reviewed as negative.    Allergies:  Banana  Food    Medications:  Albuterol   Vitamin C   Calcium   Fish oil   Vitamin D     Past Medical History:    Abnormal fasting glucose   Paroxysmal SVT   Hyperlipidemia   Type 2 diabetes mellitus,  without current use of insulin   Right side low back pain without sciatica   Tobacco abuse   Primary osteoarthritis      Past Surgical History:    Hysterectomy      Family History:    Cancer -mother   Cardiovascular - father   Breast cancer - father   Cerebrovascular disease - father     Social History:  Presents to the ED: via EMS   Current Every Day Smoker   PCP: Jinny Raya    Physical Exam     Patient Vitals for the past 24 hrs:   BP Temp Temp src Pulse Resp SpO2 Weight   12/15/21 0957 -- -- -- 90 15 (!) 88 % --   12/15/21 0949 -- -- -- 86 17 90 % --   12/15/21 0946 -- -- -- 79 19 (!) 89 % --   12/15/21 0754 (!) 153/100 -- -- -- -- -- --   12/15/21 0749 -- 98.1  F (36.7  C) Oral 93 20 95 % 72.6 kg (160 lb)       Physical Exam    Eye:  Pupils are equal, round, and reactive.  Extraocular movements intact.    ENT:  No rhinorrhea.  Moist mucus membranes.  Normal tongue and tonsil.    Cardiac:  Regular rate and rhythm.  No murmurs, gallops, or rubs.    Pulmonary: There is coarse rhonchi, left greater than right. Frequent productive cough with hypoxia at 88% on room air.    Abdomen:  Positive bowel sounds.  Abdomen is soft and non-distended, without focal tenderness.    Musculoskeletal:  Normal movement of all extremities without evidence for deficit.    Skin:  Warm and dry without rashes.    Neurologic:  Non-focal exam without asymmetric weakness or numbness.     Psychiatric:  Normal affect with appropriate interaction with examiner.    Emergency Department Course     Imaging:  XR Chest Port 1 View   Final Result   IMPRESSION: Negative chest.       NISHANT AUGUSTINE MD            SYSTEM ID:  [id number]        Report per radiology    Laboratory:  Labs Ordered and Resulted from Time of ED Arrival to Time of ED Departure   COMPREHENSIVE METABOLIC PANEL - Abnormal       Result Value    Sodium 142      Potassium 3.8      Chloride 111 (*)     Carbon Dioxide (CO2) 28      Anion Gap 3      Urea Nitrogen 10       Creatinine 0.74      Calcium 9.2      Glucose 170 (*)     Alkaline Phosphatase 74      AST 14      ALT 21      Protein Total 7.1      Albumin 3.4      Bilirubin Total 0.4      GFR Estimate 86     INFLUENZA A/B & SARS-COV2 PCR MULTIPLEX - Abnormal    Influenza A PCR Positive (*)     Influenza B PCR Negative      SARS CoV2 PCR Negative     CBC WITH PLATELETS AND DIFFERENTIAL - Abnormal    WBC Count 4.0      RBC Count 4.62      Hemoglobin 13.9      Hematocrit 42.5      MCV 92      MCH 30.1      MCHC 32.7      RDW 13.7      Platelet Count 191      % Neutrophils 34      % Lymphocytes 54      % Monocytes 10      % Eosinophils 1      % Basophils 1      % Immature Granulocytes 0      NRBCs per 100 WBC 0      Absolute Neutrophils 1.4 (*)     Absolute Lymphocytes 2.2      Absolute Monocytes 0.4      Absolute Eosinophils 0.0      Absolute Basophils 0.0      Absolute Immature Granulocytes 0.0      Absolute NRBCs 0.0     ISTAT GASES LACTATE VENOUS POCT - Abnormal    Lactic Acid POCT 0.8      Bicarbonate Venous POCT 27      O2 Sat, Venous POCT 81 (*)     pCO2V Venous POCT 42      pH Venous POCT 7.41      pO2 Venous POCT 45     TROPONIN I - Normal    Troponin I High Sensitivity 9     NT PROBNP INPATIENT - Normal    N terminal Pro BNP Inpatient 121     RBC AND PLATELET MORPHOLOGY    Platelet Assessment        Value: Automated Count Confirmed. Platelet morphology is normal.    RBC Morphology Confirmed RBC Indices     PROCALCITONIN     Emergency Department Course:    Reviewed:  I reviewed the patient's nursing notes, vitals and past medical history.     Assessments:  0740 I received brief report from EMS and history from the patient in room ED 24.  0800 I performed an exam of the patient.   0925 I updated the patient on results and discussed plan of care. Road test will determine disposition.   1000 Patient rechecked. Patient hypoxic with ambulation. Will plan to admit to hospitalist.     Consults:   1015 I spoke with Dr. Goowdin of  the hospitalist service regarding patient's presentation, findings, and plan of care.    Interventions:   Tamiflu, 75 mg, Oral    Disposition:  The patient was admitted to the hospital under the care of Dr. Goodwin.     Impression & Plan     Medical Decision Making:  Martha Neal is a 61 year old female who presents to the emergency department today for evaluation of increasing shortness of breath in the setting of having what she thought was influenza.  She is correct, with her influenza a swab returned positive.  While she feels as though the body aches and fevers have improved, she has had increased work of breathing and was found to be hypoxic today.  She continues to be hypoxic here and I feel she will require admission to the hospital.  Otherwise, the remainder of her labs are reassuring.  She was given a dose of Tamiflu and they will decide on steroids and she is admitted.  She is feeling improved on 2 L by nasal cannula and I spoke with Dr. Goodwin of the hospitalist service will admit the patient.        Covid-19  Martha Neal was evaluated during a global COVID-19 pandemic, which necessitated consideration that the patient might be at risk for infection with the SARS-CoV-2 virus that causes COVID-19.   Applicable protocols for evaluation were followed during the patient's care.   COVID-19 was considered as part of the patient's evaluation. The plan for testing is:  a test was obtained during this visit.    Diagnosis:    ICD-10-CM    1. Acute respiratory failure with hypoxia (H)  J96.01    2. Influenza A  J10.1      Scribe Disclosure:  I, Vanessa Ojeda, am serving as a scribe at 8:06 AM on 12/15/2021 to document services personally performed by Trierweiler, Chad A, MD based on my observations and the provider's statements to me.    This note was completed in part using Dragon voice recognition software. Although reviewed after completion, some word and grammatical errors may occur.           Trierweiler, Chad A, MD  12/15/21 1987

## 2021-12-15 NOTE — PROGRESS NOTES
Pharmacy Medication History  Admission medication history interview status for the 12/15/2021  admission is complete. See EPIC admission navigator for prior to admission medications     Location of Interview: Patient room  Medication history sources: Patient    Significant changes made to the medication list:  None    In the past week, patient estimated taking medication this percent of the time: NA    Additional medication history information:   -Patient denies taking any prescription meds. Only OTCs/supplements    Medication reconciliation completed by provider prior to medication history? No    Time spent in this activity: 5 mins    Prior to Admission medications    Medication Sig Last Dose Taking? Auth Provider   calcium carbonate-vitamin D (OSCAL W/D) 500-200 MG-UNIT tablet Take 1 tablet by mouth daily Past Week at Unknown time Yes Unknown, Entered By History   cholecalciferol 25 MCG (1000 UT) TABS Take 1,000 Units by mouth daily Past Week at Unknown time Yes Unknown, Entered By History   Omega-3 Fatty Acids (FISH OIL) 1200 MG CAPS Take 1 capsule by mouth daily  Past Week at Unknown time Yes Reported, Patient   vitamin C (ASCORBIC ACID) 500 MG tablet Take 500 mg by mouth daily Past Week at Unknown time Yes Unknown, Entered By History       The information provided in this note is only as accurate as the sources available at the time of update(s)

## 2021-12-15 NOTE — PROGRESS NOTES
Admission    Patient arrives to room 609 via cart from ED.  Care plan note: Placed pt on droplet precautions, reviewed plan of care with patient, dropped oxygen from 4L to 2L NC sating at 95%.     Inpatient nursing criteria listed below were met:    Did you put disposition on whiteboard and in sticky note: Yes  Full skin assessment done (add LDA if skin issue present) :Yes  Isolation education started/completed Yes  Patient allergies verified with patient: Yes  Fall Risk? (Care plan updated, Education given and documented) No  Primary Care Plan initiated: Yes  Home medications documented in belongings flowsheet: NA  Patient belongings documented in belongings flowsheet: Yes  Reminder note (belongings/ medications) placed in discharge instructions:Yes  Admission profile/ required documentation complete: Yes  If patient is a 72 hour hold/Commitment are belongings removed from room and locked up? NA

## 2021-12-15 NOTE — ED NOTES
[facility]  ED Nurse Handoff Report    ED Chief complaint: Shortness of Breath      ED Diagnosis:   Final diagnoses:   Acute respiratory failure with hypoxia (H)   Influenza A       Code Status: TBD by hospitalist    Allergies:   Allergies   Allergen Reactions     Banana GI Disturbance     Food GI Disturbance     Green and red bell peppers       Patient Story:  Patient had sudden onset SOB and tachycardia when ambulating to bathroom this morning. States that she had influenza-like symptoms last week but was feeling better.     Focused Assessment:    Patient A&Ox4. Patient oxygen sats go down to high 80s on room air. Patient has productive cough. Pulse, respiratory rate and rhythm WDL. Patient 95% on 2L NC.    Labs Ordered and Resulted from Time of ED Arrival to Time of ED Departure   COMPREHENSIVE METABOLIC PANEL - Abnormal       Result Value    Sodium 142      Potassium 3.8      Chloride 111 (*)     Carbon Dioxide (CO2) 28      Anion Gap 3      Urea Nitrogen 10      Creatinine 0.74      Calcium 9.2      Glucose 170 (*)     Alkaline Phosphatase 74      AST 14      ALT 21      Protein Total 7.1      Albumin 3.4      Bilirubin Total 0.4      GFR Estimate 86     INFLUENZA A/B & SARS-COV2 PCR MULTIPLEX - Abnormal    Influenza A PCR Positive (*)     Influenza B PCR Negative      SARS CoV2 PCR Negative     CBC WITH PLATELETS AND DIFFERENTIAL - Abnormal    WBC Count 4.0      RBC Count 4.62      Hemoglobin 13.9      Hematocrit 42.5      MCV 92      MCH 30.1      MCHC 32.7      RDW 13.7      Platelet Count 191      % Neutrophils 34      % Lymphocytes 54      % Monocytes 10      % Eosinophils 1      % Basophils 1      % Immature Granulocytes 0      NRBCs per 100 WBC 0      Absolute Neutrophils 1.4 (*)     Absolute Lymphocytes 2.2      Absolute Monocytes 0.4      Absolute Eosinophils 0.0      Absolute Basophils 0.0      Absolute Immature Granulocytes 0.0      Absolute NRBCs 0.0     ISTAT GASES LACTATE VENOUS  POCT - Abnormal    Lactic Acid POCT 0.8      Bicarbonate Venous POCT 27      O2 Sat, Venous POCT 81 (*)     pCO2V Venous POCT 42      pH Venous POCT 7.41      pO2 Venous POCT 45     TROPONIN I - Normal    Troponin I High Sensitivity 9     NT PROBNP INPATIENT - Normal    N terminal Pro BNP Inpatient 121     RBC AND PLATELET MORPHOLOGY    Platelet Assessment        Value: Automated Count Confirmed. Platelet morphology is normal.    RBC Morphology Confirmed RBC Indices         XR Chest Port 1 View   Final Result   IMPRESSION: Negative chest.       NISHANT AUGUSTINE MD            SYSTEM ID:  [id number]            Treatments and/or interventions provided:  Medications   sodium chloride (PF) 0.9% PF flush 3 mL (has no administration in time range)   sodium chloride (PF) 0.9% PF flush 3 mL (has no administration in time range)   oseltamivir (TAMIFLU) capsule 75 mg (has no administration in time range)       Patient's response to treatments and/or interventions:  Remains stable.    To be done/followed up on inpatient unit:   See any in-patient orders. Monitor oxygen saturations    Does this patient have any cognitive concerns?: N/A    Activity level - Baseline/Home:    Independent    Activity Level - Current:    Independent    Patient's Preferred language: English     Needed?: No    Isolation: None and Droplet  Infection: Not Applicable  Influenza  Patient tested for COVID 19 prior to admission: YES    Bariatric?: No    Vital Signs:   Vitals:    12/15/21 0754 12/15/21 0946 12/15/21 0949 12/15/21 0957   BP: (!) 153/100      Pulse:  79 86 90   Resp:  19 17 15   Temp:       TempSrc:       SpO2:  (!) 89% 90% (!) 88%   Weight:           Cardiac Rhythm:     Was the PSS-3 completed:   Yes  What interventions are required if any?                 Family Comments: Reachable by phone.    OBS brochure/video discussed/provided to patient/family: N/A              Name of person given brochure if not patient: N/A               Relationship to patient: N/A    For the majority of the shift this patient's behavior was Green.  Behavioral interventions performed were N/A.    ED NURSE PHONE NUMBER: *[phone]

## 2021-12-15 NOTE — ED TRIAGE NOTES
Pt had sudden onset of SOB and tachycardia this morning ambulating to the bathroom. States she had flu like symptoms last week but they have since resolved. Pt oxygen saturations were high 80s on room air.

## 2021-12-15 NOTE — PROGRESS NOTES
RECEIVING UNIT ED HANDOFF REVIEW    ED Nurse Handoff Report was reviewed by: Courtney Vasques RN on December 15, 2021 at 11:45 AM

## 2021-12-15 NOTE — H&P
[facility]    History and Physical - Hospitalist Service       Date of Admission:  12/15/2021    Assessment & Plan      Martha Neal is a 61 year old female admitted on 12/15/2021 for SOA and found to be Influenza A positive.    Influenza A  Cough and SOA  Acute hypoxic respiratory failure  Developed symptoms about a week ago with fatigue, muscle aches and fevers. She reports last fever about 4-5 days ago and then she developed worsening cough and SOA. Cough productive of yellow sputum. Within the ED, patient hypoxic to 88% on RA. CXR without obvious infilrates. Influenza positive. Covid negative. Patient start on tamiflu.    > admit for supportive care with oxygen and cough suppressants  > WBC normal and no obvious signs of bacterial infection, add on procal and follow  > no obvious wheezing however will start duoneb q4 hours    Tobacco Abuse  Current every day smoker >15 cigarettes since 13 years old  Very anxious with cravings  - start on NRT    DM2  Last A1C 6.5 in 2019 however patient denies any knowledge of diabetes diagnosis  Repeat A1C here and then may add on sliding scale insulin       Diet: Combination Diet Regular Diet Adult    DVT Prophylaxis: Enoxaparin (Lovenox) SQ  La Catheter: Not present  Central Lines: None  Code Status:   DNR/DNI    Clinically Significant Risk Factors Present on Admission                   Disposition Plan   Expected Discharge: >2 days nights    Martha Goodwin DO  [facility]  Securely message with the Vocera Web Console (learn more here)  Text page via [employer] Paging/Directory        ______________________________________________________________________    Chief Complaint   SOA    History is obtained from the patient and ER doc.     History of Present Illness   Martha Neal is a 61 year old female who presents for SOA. She reports developed symptoms about a week ago with fatigue, muscle aches and fevers. She reports  last fever about 4-5 days ago and then she developed worsening cough and SOA. Cough productive of yellow sputum. Within the ED, patient hypoxic to 88% on RA. CXR without obvious infilrates. Influenza positive. Covid negative. Patient start on tamiflu.    Review of Systems    The 10 point Review of Systems is negative other than noted in the HPI or here.     Past Medical History    I have reviewed this patient's medical history and updated it with pertinent information if needed.   Past Medical History:   Diagnosis Date     Abnormal fasting glucose 12/14/2015    A1C      6.3   12/16/2015.  Pt is going to cut out soda and work on increasing exercise.        Past Surgical History   I have reviewed this patient's surgical history and updated it with pertinent information if needed.  Past Surgical History:   Procedure Laterality Date     HYSTERECTOMY  2002     HYSTERECTOMY, PAP NO LONGER INDICATED         Social History   I have reviewed this patient's social history and updated it with pertinent information if needed.  Social History     Tobacco Use     Smoking status: Current Every Day Smoker     Packs/day: 0.25     Years: 45.00     Pack years: 11.25     Types: Cigarettes     Smokeless tobacco: Never Used   Substance Use Topics     Alcohol use: No     Drug use: No       Family History   I have reviewed this patient's family history and updated it with pertinent information if needed.  Family History   Problem Relation Age of Onset     Cancer Mother      Cardiovascular Father      Breast Cancer Father      Cerebrovascular Disease Father      Other - See Comments Sister      Diabetes No family hx of      Coronary Artery Disease No family hx of      Colon Cancer No family hx of      Prostate Cancer No family hx of      Depression No family hx of      Anxiety Disorder No family hx of      Mental Illness No family hx of      Substance Abuse No family hx of      Anesthesia Reaction No family hx of      Asthma No family hx of       Osteoporosis No family hx of      Genetic Disorder No family hx of      Thyroid Disease No family hx of        Prior to Admission Medications   Prior to Admission Medications   Prescriptions Last Dose Informant Patient Reported? Taking?   Omega-3 Fatty Acids (FISH OIL) 1200 MG CAPS Past Week at Unknown time Self Yes Yes   Sig: Take 1 capsule by mouth daily    calcium carbonate-vitamin D (OSCAL W/D) 500-200 MG-UNIT tablet Past Week at Unknown time Self Yes Yes   Sig: Take 1 tablet by mouth daily   cholecalciferol 25 MCG (1000 UT) TABS Past Week at Unknown time Self Yes Yes   Sig: Take 1,000 Units by mouth daily   vitamin C (ASCORBIC ACID) 500 MG tablet Past Week at Unknown time Self Yes Yes   Sig: Take 500 mg by mouth daily      Facility-Administered Medications: None     Allergies   Allergies   Allergen Reactions     Banana GI Disturbance     Food GI Disturbance     Green and red bell peppers       Physical Exam   Vital Signs: Temp: 98.1  F (36.7  C) Temp src: Oral BP: (!) 153/100 Pulse: 90   Resp: 15 SpO2: (!) 88 % O2 Device: None (Room air) Oxygen Delivery: 3 LPM  Weight: 160 lbs 0 oz    Constitutional: Awake, alert, cooperative, no apparent distress.  Eyes: Conjunctiva and pupils examined and normal.  HEENT: Moist mucous membranes,poor dentition  Respiratory: course breath sounds, no wheezing  Cardiovascular: Regular rate and rhythm, normal S1 and S2, and no murmur noted. Trace LE edema  GI: Soft, non-distended, non-tender, normal bowel sounds.  Lymph/Hematologic: No anterior cervical or supraclavicular adenopathy.  Skin: No rashes, no cyanosis, no edema.  Musculoskeletal: No joint swelling, erythema or tenderness.  Neurologic: Cranial nerves 2-12 intact, normal strength and sensation.  Psychiatric: Alert, oriented to person, place and time, no obvious anxiety or depression.    Data   Data reviewed today: I reviewed all medications, new labs and imaging results over the last 24 hours. I personally reviewed  CXR without obvious inltrates    Recent Labs   Lab 12/15/21  0801   WBC 4.0   HGB 13.9   MCV 92         POTASSIUM 3.8   CHLORIDE 111*   CO2 28   BUN 10   CR 0.74   ANIONGAP 3   ARLENE 9.2   *   ALBUMIN 3.4   PROTTOTAL 7.1   BILITOTAL 0.4   ALKPHOS 74   ALT 21   AST 14     Recent Results (from the past 24 hour(s))   XR Chest Port 1 View    Narrative    CHEST ONE VIEW PORTABLE   12/15/2021 8:20 AM     HISTORY: Cough, hypoxia with concern for COVID.    COMPARISON: None.      Impression    IMPRESSION: Negative chest.     NISHANT AUGUSTINE MD         SYSTEM ID:  [id number]

## 2021-12-15 NOTE — PLAN OF CARE
Summary: SOB, Influenza A  DATE & TIME: 12/15/21 7-7  Cognitive Concerns/ Orientation : AOx4  BEHAVIOR & AGGRESSION TOOL COLOR: Green  ABNL VS/O2: VSS on 2L NC  MOBILITY: SBA, up to bathroom 2x this shift  PAIN MANAGMENT: denies  DIET: regular-tolerating well   BOWEL/BLADDER: Continent BB, up to bathroom with assist of 1  ABNL LAB/BG: NA  DRAIN/DEVICES: NA  TELEMETRY RHYTHM: NA  SKIN: WDL  TESTS/PROCEDURES: None  D/C DAY/GOALS/PLACE: Few days pending improvement of symptoms and weaning of oxygen   OTHER IMPORTANT INFO: Pt requested IV to be removed due to discomfort. New one has not been replaced-no IV meds ordered.

## 2021-12-16 VITALS
RESPIRATION RATE: 20 BRPM | SYSTOLIC BLOOD PRESSURE: 160 MMHG | BODY MASS INDEX: 30.49 KG/M2 | TEMPERATURE: 98 F | OXYGEN SATURATION: 95 % | DIASTOLIC BLOOD PRESSURE: 95 MMHG | WEIGHT: 162.7 LBS | HEART RATE: 93 BPM

## 2021-12-16 LAB
ANION GAP SERPL CALCULATED.3IONS-SCNC: 5 MMOL/L (ref 3–14)
BASOPHILS # BLD AUTO: 0 10E3/UL (ref 0–0.2)
BASOPHILS NFR BLD AUTO: 0 %
BUN SERPL-MCNC: 9 MG/DL (ref 7–30)
CALCIUM SERPL-MCNC: 9.3 MG/DL (ref 8.5–10.1)
CHLORIDE BLD-SCNC: 110 MMOL/L (ref 94–109)
CO2 SERPL-SCNC: 26 MMOL/L (ref 20–32)
CREAT SERPL-MCNC: 0.75 MG/DL (ref 0.52–1.04)
EOSINOPHIL # BLD AUTO: 0 10E3/UL (ref 0–0.7)
EOSINOPHIL NFR BLD AUTO: 0 %
ERYTHROCYTE [DISTWIDTH] IN BLOOD BY AUTOMATED COUNT: 13.5 % (ref 10–15)
GFR SERPL CREATININE-BSD FRML MDRD: 85 ML/MIN/1.73M2
GLUCOSE BLD-MCNC: 171 MG/DL (ref 70–99)
HCT VFR BLD AUTO: 42.5 % (ref 35–47)
HGB BLD-MCNC: 13.4 G/DL (ref 11.7–15.7)
IMM GRANULOCYTES # BLD: 0 10E3/UL
IMM GRANULOCYTES NFR BLD: 0 %
LYMPHOCYTES # BLD AUTO: 1.9 10E3/UL (ref 0.8–5.3)
LYMPHOCYTES NFR BLD AUTO: 48 %
MCH RBC QN AUTO: 29.9 PG (ref 26.5–33)
MCHC RBC AUTO-ENTMCNC: 31.5 G/DL (ref 31.5–36.5)
MCV RBC AUTO: 95 FL (ref 78–100)
MONOCYTES # BLD AUTO: 0.5 10E3/UL (ref 0–1.3)
MONOCYTES NFR BLD AUTO: 12 %
NEUTROPHILS # BLD AUTO: 1.6 10E3/UL (ref 1.6–8.3)
NEUTROPHILS NFR BLD AUTO: 40 %
NRBC # BLD AUTO: 0 10E3/UL
NRBC BLD AUTO-RTO: 0 /100
PLAT MORPH BLD: NORMAL
PLATELET # BLD AUTO: 225 10E3/UL (ref 150–450)
POTASSIUM BLD-SCNC: 3.9 MMOL/L (ref 3.4–5.3)
RBC # BLD AUTO: 4.48 10E6/UL (ref 3.8–5.2)
RBC MORPH BLD: NORMAL
SODIUM SERPL-SCNC: 141 MMOL/L (ref 133–144)
WBC # BLD AUTO: 4 10E3/UL (ref 4–11)

## 2021-12-16 PROCEDURE — 250N000009 HC RX 250: Performed by: STUDENT IN AN ORGANIZED HEALTH CARE EDUCATION/TRAINING PROGRAM

## 2021-12-16 PROCEDURE — 250N000012 HC RX MED GY IP 250 OP 636 PS 637: Performed by: INTERNAL MEDICINE

## 2021-12-16 PROCEDURE — 999N000157 HC STATISTIC RCP TIME EA 10 MIN

## 2021-12-16 PROCEDURE — 250N000011 HC RX IP 250 OP 636: Performed by: STUDENT IN AN ORGANIZED HEALTH CARE EDUCATION/TRAINING PROGRAM

## 2021-12-16 PROCEDURE — 99239 HOSP IP/OBS DSCHRG MGMT >30: CPT | Performed by: INTERNAL MEDICINE

## 2021-12-16 PROCEDURE — 94640 AIRWAY INHALATION TREATMENT: CPT

## 2021-12-16 PROCEDURE — 250N000013 HC RX MED GY IP 250 OP 250 PS 637: Performed by: STUDENT IN AN ORGANIZED HEALTH CARE EDUCATION/TRAINING PROGRAM

## 2021-12-16 PROCEDURE — 36415 COLL VENOUS BLD VENIPUNCTURE: CPT | Performed by: STUDENT IN AN ORGANIZED HEALTH CARE EDUCATION/TRAINING PROGRAM

## 2021-12-16 PROCEDURE — 85004 AUTOMATED DIFF WBC COUNT: CPT | Performed by: STUDENT IN AN ORGANIZED HEALTH CARE EDUCATION/TRAINING PROGRAM

## 2021-12-16 PROCEDURE — 80048 BASIC METABOLIC PNL TOTAL CA: CPT | Performed by: STUDENT IN AN ORGANIZED HEALTH CARE EDUCATION/TRAINING PROGRAM

## 2021-12-16 RX ORDER — PREDNISONE 20 MG/1
40 TABLET ORAL DAILY
Status: DISCONTINUED | OUTPATIENT
Start: 2021-12-16 | End: 2021-12-16 | Stop reason: HOSPADM

## 2021-12-16 RX ORDER — GUAIFENESIN/DEXTROMETHORPHAN 100-10MG/5
10 SYRUP ORAL EVERY 4 HOURS PRN
Qty: 118 ML | Refills: 0 | Status: SHIPPED | OUTPATIENT
Start: 2021-12-16 | End: 2022-03-08

## 2021-12-16 RX ORDER — PREDNISONE 20 MG/1
40 TABLET ORAL DAILY
Qty: 8 TABLET | Refills: 0 | Status: SHIPPED | OUTPATIENT
Start: 2021-12-17 | End: 2021-12-21

## 2021-12-16 RX ORDER — OSELTAMIVIR PHOSPHATE 75 MG/1
75 CAPSULE ORAL 2 TIMES DAILY
Qty: 6 CAPSULE | Refills: 0 | Status: SHIPPED | OUTPATIENT
Start: 2021-12-16 | End: 2021-12-19

## 2021-12-16 RX ADMIN — OSELTAMIVIR PHOSPHATE 75 MG: 75 CAPSULE ORAL at 07:57

## 2021-12-16 RX ADMIN — IPRATROPIUM BROMIDE AND ALBUTEROL SULFATE 3 ML: .5; 3 SOLUTION RESPIRATORY (INHALATION) at 08:47

## 2021-12-16 RX ADMIN — Medication 1 MG: at 02:05

## 2021-12-16 RX ADMIN — ENOXAPARIN SODIUM 40 MG: 40 INJECTION SUBCUTANEOUS at 12:57

## 2021-12-16 RX ADMIN — PREDNISONE 40 MG: 20 TABLET ORAL at 12:56

## 2021-12-16 ASSESSMENT — ACTIVITIES OF DAILY LIVING (ADL)
ADLS_ACUITY_SCORE: 5
ADLS_ACUITY_SCORE: 7
ADLS_ACUITY_SCORE: 5
ADLS_ACUITY_SCORE: 7
ADLS_ACUITY_SCORE: 5
ADLS_ACUITY_SCORE: 5
ADLS_ACUITY_SCORE: 7

## 2021-12-16 NOTE — PLAN OF CARE
Discharge    Patient discharged to home via car with friend.   Care plan note: Reviewed discharge instructions and medications with patient. No IV access, all medications given for shift. Pt leaving on RA    Listed belongings gathered and given to patient (including from security/pharmacy). Yes  Care Plan and Patient education resolved: Yes  Prescriptions if needed, hard copies sent with patient  NA  Medication Bin checked and emptied on discharge Yes  SW/care coordinator/charge RN aware of discharge: Yes

## 2021-12-16 NOTE — PLAN OF CARE
Summary: SOB, Influenza A  DATE & TIME: 12/15-12/16/21 7p-7a  Cognitive Concerns/ Orientation : AOx4  BEHAVIOR & AGGRESSION TOOL COLOR: Green  ABNL VS/O2: VSS on 2L NC.   MOBILITY: SBA, up to bathroom 2x this shift  PAIN MANAGMENT: denies  DIET: regular-tolerating well   BOWEL/BLADDER: Continent  ABNL LAB/BG: NA  DRAIN/DEVICES: N/A  TELEMETRY RHYTHM: N/A  SKIN: WDL  TESTS/PROCEDURES: None  D/C DAY/GOALS/PLACE:  pending improvement of symptoms and weaning of oxygen   OTHER IMPORTANT INFO: PIV removed d/t discomfort, new one not replaced- no IV meds ordered.

## 2021-12-16 NOTE — DISCHARGE SUMMARY
[facility]  Hospitalist Discharge Summary      Date of Admission:  12/15/2021  Date of Discharge:  12/16/2021  2:43 PM  Discharging Provider: Abhishek Lowe DO      Discharge Diagnoses   Influenza A  Acute hypoxic respiratory failure  Suspected undiagnosed COPD with exacerbation   Tobacco Abuse   DM type II     Follow-ups Needed After Discharge   Follow-up Appointments     Follow-up and recommended labs and tests       Follow up with primary care provider, Jinny Raya, within   2-4 weeks, for hospital follow- up. The following labs/tests are   recommended: Recommend outpatient PFTs in 2-3 months.           Unresulted Labs Ordered in the Past 30 Days of this Admission     No orders found for last 31 day(s).        Discharge Disposition   Discharged to home  Condition at discharge: Stable    Hospital Course   Martha Neal is a 61 year old female admitted on 12/15/2021 for SOA and found to be Influenza A positive.     Influenza A  Cough and SOA  Acute hypoxic respiratory failure  Suspected COPD   Developed symptoms about a week ago with fatigue, muscle aches and fevers. She reports last fever about 4-5 days ago and then she developed worsening cough and SOA. Cough productive of yellow sputum. Within the ED, patient hypoxic to 88% on RA. CXR without obvious infilrates. Influenza positive. Covid negative. Patient start on tamiflu.  - Continue tamiflu  - Started on Prednisone and afterwards able to be titrated off of O2.  Ambulated in department without issue   - Outpatient follow up with recommendations for PFTs      Tobacco Abuse  Current every day smoker >15 cigarettes since 13 years old  Very anxious with cravings  - Smoking cessation recommended      DM2  Last A1C 6.5 in 2019 however patient denies any knowledge of diabetes diagnosis  Repeat A1C here and then may add on sliding scale insulin         Consultations This Hospital Stay   None    Code Status   No CPR- Do  NOT Intubate    Time Spent on this Encounter   I, Abhishek Lowe DO, personally saw the patient today and spent greater than 30 minutes discharging this patient.       Abhishek Lowe DO  [place] [address] [facility]  [address] [place] [address]  Phone: [phone]  ______________________________________________________________________    Physical Exam   Vital Signs: Temp: 98  F (36.7  C) Temp src: Oral BP: (!) 160/95 Pulse: 93   Resp: 20 SpO2: 95 % O2 Device: Nasal cannula Oxygen Delivery: 2 LPM  Weight: 162 lbs 11.19 oz  General Appearance: Resting comfortably. NAD   Respiratory: Faint end expiratory wheezing in bilateral upper lobes.  No respiratory distress   Cardiovascular: RRR.  No obvious murmurs  GI: Soft.  Non-distended  Skin: No obvious rashes or cyanosis  Other: No edema.  No calf tenderness         Primary Care Physician   Jinny Raya    Discharge Orders      Reason for your hospital stay    Influenza and possible COPD     Follow-up and recommended labs and tests     Follow up with primary care provider, Jinny Raya, within 2-4 weeks, for hospital follow- up. The following labs/tests are recommended: Recommend outpatient PFTs in 2-3 months.     Activity    Your activity upon discharge: activity as tolerated     Diet    Follow this diet upon discharge: \Orders Placed This Encounter      Combination Diet Regular Diet Adult       Significant Results and Procedures   Most Recent 3 CBC's:Recent Labs   Lab Test 12/16/21  0826 12/15/21  0801   WBC 4.0 4.0   HGB 13.4 13.9   MCV 95 92    191     Most Recent 3 BMP's:Recent Labs   Lab Test 12/16/21  0826 12/15/21  0801 12/05/19  0857    142 140   POTASSIUM 3.9 3.8 3.8   CHLORIDE 110* 111* 109   CO2 26 28 28   BUN 9 10 15   CR 0.75 0.74 0.84   ANIONGAP 5 3 3   ARLENE 9.3 9.2 9.3   * 170* 135*   ,   Results for orders placed or performed during the hospital encounter of  12/15/21   XR Chest Port 1 View    Narrative    CHEST ONE VIEW PORTABLE   12/15/2021 8:20 AM     HISTORY: Cough, hypoxia with concern for COVID.    COMPARISON: None.      Impression    IMPRESSION: Negative chest.     NISHANT AUGUSTINE MD         SYSTEM ID:  [id number]       Discharge Medications   Current Discharge Medication List      START taking these medications    Details   guaiFENesin-dextromethorphan (ROBITUSSIN DM) 100-10 MG/5ML syrup Take 10 mLs by mouth every 4 hours as needed for cough  Qty: 118 mL, Refills: 0    Comments: Future refills by PCP Dr. Jinny Raya with phone number [phone].  Associated Diagnoses: Influenza A      oseltamivir (TAMIFLU) 75 MG capsule Take 1 capsule (75 mg) by mouth 2 times daily for 3 days  Qty: 6 capsule, Refills: 0    Comments: Future refills by PCP Dr. Jinny Raya with phone number [phone].  Associated Diagnoses: Influenza A      predniSONE (DELTASONE) 20 MG tablet Take 2 tablets (40 mg) by mouth daily for 4 days  Qty: 8 tablet, Refills: 0    Comments: Future refills by PCP Dr. Jinny Raya with phone number [phone].  Associated Diagnoses: Acute respiratory failure with hypoxia (H)         CONTINUE these medications which have NOT CHANGED    Details   calcium carbonate-vitamin D (OSCAL W/D) 500-200 MG-UNIT tablet Take 1 tablet by mouth daily      cholecalciferol 25 MCG (1000 UT) TABS Take 1,000 Units by mouth daily      Omega-3 Fatty Acids (FISH OIL) 1200 MG CAPS Take 1 capsule by mouth daily       vitamin C (ASCORBIC ACID) 500 MG tablet Take 500 mg by mouth daily           Allergies   Allergies   Allergen Reactions     Banana GI Disturbance     Food GI Disturbance     Green and red munson peppers

## 2022-01-09 ENCOUNTER — HEALTH MAINTENANCE LETTER (OUTPATIENT)
Age: 62
End: 2022-01-09

## 2022-01-12 PROBLEM — E11.9 TYPE 2 DIABETES MELLITUS (H): Status: ACTIVE | Noted: 2022-01-12

## 2022-01-12 PROBLEM — I47.10 PAROXYSMAL SUPRAVENTRICULAR TACHYCARDIA (H): Status: RESOLVED | Noted: 2018-09-24 | Resolved: 2022-01-12

## 2022-01-12 PROBLEM — M54.50 RIGHT-SIDED LOW BACK PAIN WITHOUT SCIATICA, UNSPECIFIED CHRONICITY: Status: RESOLVED | Noted: 2017-06-27 | Resolved: 2022-01-12

## 2022-01-12 PROBLEM — Z84.81 FAMILY HISTORY OF BRCA GENE POSITIVE: Status: RESOLVED | Noted: 2018-08-16 | Resolved: 2022-01-12

## 2022-01-12 PROBLEM — E78.2 MIXED HYPERLIPIDEMIA: Status: RESOLVED | Noted: 2018-08-15 | Resolved: 2022-01-12

## 2022-01-12 PROBLEM — E11.9 TYPE 2 DIABETES MELLITUS WITHOUT COMPLICATION, WITHOUT LONG-TERM CURRENT USE OF INSULIN (H): Status: RESOLVED | Noted: 2018-03-22 | Resolved: 2022-01-12

## 2022-01-12 PROBLEM — J10.1 INFLUENZA A: Status: RESOLVED | Noted: 2021-12-15 | Resolved: 2022-01-12

## 2022-01-12 PROBLEM — J96.01 ACUTE RESPIRATORY FAILURE WITH HYPOXIA (H): Status: RESOLVED | Noted: 2021-12-15 | Resolved: 2022-01-12

## 2022-01-13 ENCOUNTER — OFFICE VISIT (OUTPATIENT)
Dept: INTERNAL MEDICINE | Facility: CLINIC | Age: 62
End: 2022-01-13
Payer: COMMERCIAL

## 2022-01-13 VITALS
BODY MASS INDEX: 30.92 KG/M2 | DIASTOLIC BLOOD PRESSURE: 80 MMHG | SYSTOLIC BLOOD PRESSURE: 140 MMHG | OXYGEN SATURATION: 97 % | HEART RATE: 100 BPM | RESPIRATION RATE: 16 BRPM | WEIGHT: 165 LBS | TEMPERATURE: 98.3 F

## 2022-01-13 DIAGNOSIS — Z87.891 PERSONAL HISTORY OF TOBACCO USE, PRESENTING HAZARDS TO HEALTH: ICD-10-CM

## 2022-01-13 DIAGNOSIS — J10.1 INFLUENZA A: ICD-10-CM

## 2022-01-13 DIAGNOSIS — E11.9 TYPE 2 DIABETES MELLITUS WITHOUT COMPLICATION, WITHOUT LONG-TERM CURRENT USE OF INSULIN (H): ICD-10-CM

## 2022-01-13 DIAGNOSIS — J96.01 ACUTE RESPIRATORY FAILURE WITH HYPOXIA (H): ICD-10-CM

## 2022-01-13 DIAGNOSIS — Z12.11 SPECIAL SCREENING FOR MALIGNANT NEOPLASMS, COLON: ICD-10-CM

## 2022-01-13 DIAGNOSIS — Z12.31 ENCOUNTER FOR SCREENING MAMMOGRAM FOR BREAST CANCER: ICD-10-CM

## 2022-01-13 DIAGNOSIS — Z09 HOSPITAL DISCHARGE FOLLOW-UP: Primary | ICD-10-CM

## 2022-01-13 DIAGNOSIS — Z23 NEED FOR VACCINATION: ICD-10-CM

## 2022-01-13 PROCEDURE — 90750 HZV VACC RECOMBINANT IM: CPT | Performed by: INTERNAL MEDICINE

## 2022-01-13 PROCEDURE — 99214 OFFICE O/P EST MOD 30 MIN: CPT | Mod: 25 | Performed by: INTERNAL MEDICINE

## 2022-01-13 PROCEDURE — 90472 IMMUNIZATION ADMIN EACH ADD: CPT | Performed by: INTERNAL MEDICINE

## 2022-01-13 PROCEDURE — 90732 PPSV23 VACC 2 YRS+ SUBQ/IM: CPT | Performed by: INTERNAL MEDICINE

## 2022-01-13 PROCEDURE — 90471 IMMUNIZATION ADMIN: CPT | Performed by: INTERNAL MEDICINE

## 2022-01-13 NOTE — PROGRESS NOTES
ASSESSMENT/PLAN                                                      (Z09) Hospital discharge follow-up  (primary encounter diagnosis)  (J96.01) Acute respiratory failure with hypoxia (H)  (J10.1) Influenza A  Comment: clinically improving and doing reasonably well.    (E11.9) Type 2 diabetes mellitus without complication, without long-term current use of insulin (H)  Comment: new diagnosis.  Plan: referred to diabetes educator for comprehensive knowledge assessment  - patient will be contacted to schedule; referred for annual diabetic eye adult - patient will be contacted to schedule; fasting lipid profile and urine microalbumin ordered - patient to schedule; recommendations (re: statin and ACE-I/ARB) to follow; START daily daily aspirin.          (Z23) Need for vaccination  Plan: Pneumovax given today; Shingrix No. 1 given today; Shingrix No. 2 in 2-6 months.    (Z12.31) Encounter for screening mammogram for breast cancer  Plan: screening mammogram ordered - patient to schedule.     (Z12.11) Special screening for malignant neoplasms, colon  Plan: FIT test ordered - patient to pick-up, complete, and mail in when able.     (Z87.891) Personal history of tobacco use, presenting hazards to health  Plan: CT Chest Lung Cancer Scrn Low Dose wo ordered - patient will be contacted to schedule.         Amy Ruiz MD   [address] [address]  T: [phone], F: [phone]    BRAYAN Neal is a very pleasant 61 year old female who presents for hospital discharge follow-up:    Patient was hospitalized at [facility] 12/15/2021-12/16/2021 with acute hypoxic respiratory failure due to influenza A. Since discharge, patient reports that her coughing and breathing have improved significantly. Her energy and appetite are back to baseline. She is overall doing well.    Her hospital visit was notable for new diagnosis of  diabetes. Patient has a history of impaired fasting glucose but has never been diagnosed with diabetes.    She is scheduled for an establish care/physical visit in March.    She is DUE for her screening mammogram, colon cancer screening (prefers FIT), and CT chest for lung cancer screening.  She is also due for her Shingrix series, pneumonia series, and flu shot (she declines flu shot)    OBJECTIVE                                                      BP (!) 140/80 (BP Location: Left arm, Patient Position: Chair, Cuff Size: Adult Regular)   Pulse 100   Temp 98.3  F (36.8  C) (Temporal)   Resp 16   Wt 74.8 kg (165 lb)   LMP  (LMP Unknown)   SpO2 97%   BMI 30.92 kg/m    Constitutional: well-appearing  Respiratory: normal respiratory effort; clear to auscultation bilaterally  Cardiovascular: regular rate and rhythm; no edema  Gastrointestinal: soft, non-tender, and non-distended; no organomegaly or masses  Musculoskeletal: normal gait and station  Psych: normal judgment and insight; normal mood and affect; recent and remote memory intact    ---    (Note documentation was completed, in part, with [employer] voice-recognition software. Documentation was reviewed, but some grammatical, spelling, and word errors may remain.)

## 2022-01-13 NOTE — PATIENT INSTRUCTIONS
"    Please schedule fasting labs in the near future. Can provide urine sample and  FIT kit at that time.    ---    Please schedule mammogram when able.    ---    You will be contacted to schedule your CT lung cancer screening.    ---    Pneumovax today.    Shingrix #1 today; #2 in 2-6 months.    ---    You will be contacted to schedule an appointment (likely virtual) with our diabetes educator.    You will also be contacted to schedule your \"annual diabetic eye exam.\"    Please START a daily baby aspirin (new Rx sent in).    ---  "

## 2022-01-19 ENCOUNTER — HOSPITAL ENCOUNTER (OUTPATIENT)
Dept: CT IMAGING | Facility: CLINIC | Age: 62
Discharge: HOME | End: 2022-01-19
Attending: INTERNAL MEDICINE | Admitting: INTERNAL MEDICINE
Payer: COMMERCIAL

## 2022-01-19 ENCOUNTER — TELEPHONE (OUTPATIENT)
Dept: INTERNAL MEDICINE | Facility: CLINIC | Age: 62
End: 2022-01-19
Payer: COMMERCIAL

## 2022-01-19 DIAGNOSIS — Z87.891 PERSONAL HISTORY OF TOBACCO USE, PRESENTING HAZARDS TO HEALTH: ICD-10-CM

## 2022-01-19 PROCEDURE — 71271 CT THORAX LUNG CANCER SCR C-: CPT

## 2022-01-19 NOTE — TELEPHONE ENCOUNTER
Diabetes Education Scheduling Outreach #1:    Call to patient to schedule. Patient declined to schedule.    Mary Hoffman  [facility]  Diabetes and Nutrition Scheduling

## 2022-01-20 ENCOUNTER — LAB (OUTPATIENT)
Dept: LAB | Facility: CLINIC | Age: 62
End: 2022-01-20
Payer: COMMERCIAL

## 2022-01-20 ENCOUNTER — ANCILLARY PROCEDURE (OUTPATIENT)
Dept: MAMMOGRAPHY | Facility: CLINIC | Age: 62
End: 2022-01-20
Attending: INTERNAL MEDICINE
Payer: COMMERCIAL

## 2022-01-20 DIAGNOSIS — Z12.11 SPECIAL SCREENING FOR MALIGNANT NEOPLASMS, COLON: ICD-10-CM

## 2022-01-20 DIAGNOSIS — Z12.31 VISIT FOR SCREENING MAMMOGRAM: ICD-10-CM

## 2022-01-20 DIAGNOSIS — Z12.31 ENCOUNTER FOR SCREENING MAMMOGRAM FOR BREAST CANCER: ICD-10-CM

## 2022-01-20 DIAGNOSIS — E11.9 TYPE 2 DIABETES MELLITUS WITHOUT COMPLICATION, WITHOUT LONG-TERM CURRENT USE OF INSULIN (H): ICD-10-CM

## 2022-01-20 LAB
CHOLEST SERPL-MCNC: 195 MG/DL
CREAT UR-MCNC: 99 MG/DL
FASTING STATUS PATIENT QL REPORTED: YES
HDLC SERPL-MCNC: 75 MG/DL
LDLC SERPL CALC-MCNC: 94 MG/DL
MICROALBUMIN UR-MCNC: 9 MG/L
MICROALBUMIN/CREAT UR: 9.09 MG/G CR (ref 0–25)
NONHDLC SERPL-MCNC: 120 MG/DL
TRIGL SERPL-MCNC: 129 MG/DL

## 2022-01-20 PROCEDURE — 82043 UR ALBUMIN QUANTITATIVE: CPT

## 2022-01-20 PROCEDURE — 80061 LIPID PANEL: CPT

## 2022-01-20 PROCEDURE — 36415 COLL VENOUS BLD VENIPUNCTURE: CPT

## 2022-01-20 PROCEDURE — 77067 SCR MAMMO BI INCL CAD: CPT | Mod: TC | Performed by: RADIOLOGY

## 2022-01-22 PROCEDURE — 82274 ASSAY TEST FOR BLOOD FECAL: CPT

## 2022-01-24 LAB — HEMOCCULT STL QL IA: NEGATIVE

## 2022-03-06 ENCOUNTER — HEALTH MAINTENANCE LETTER (OUTPATIENT)
Age: 62
End: 2022-03-06

## 2022-03-06 ASSESSMENT — ENCOUNTER SYMPTOMS
CONSTIPATION: 0
PALPITATIONS: 0
ABDOMINAL PAIN: 0
DYSURIA: 0
HEMATURIA: 0
HEARTBURN: 0
FREQUENCY: 0
HEADACHES: 0
SORE THROAT: 0
NAUSEA: 0
MYALGIAS: 0
ARTHRALGIAS: 0
FEVER: 0
EYE PAIN: 0
JOINT SWELLING: 0
HEMATOCHEZIA: 0
SHORTNESS OF BREATH: 0
DIARRHEA: 0
NERVOUS/ANXIOUS: 0
BREAST MASS: 0
DIZZINESS: 0
PARESTHESIAS: 0
COUGH: 0
WEAKNESS: 0
CHILLS: 0

## 2022-03-08 ENCOUNTER — OFFICE VISIT (OUTPATIENT)
Dept: INTERNAL MEDICINE | Facility: CLINIC | Age: 62
End: 2022-03-08
Payer: COMMERCIAL

## 2022-03-08 VITALS
WEIGHT: 162 LBS | HEART RATE: 95 BPM | OXYGEN SATURATION: 99 % | DIASTOLIC BLOOD PRESSURE: 88 MMHG | BODY MASS INDEX: 30.58 KG/M2 | SYSTOLIC BLOOD PRESSURE: 152 MMHG | RESPIRATION RATE: 16 BRPM | TEMPERATURE: 97.5 F | HEIGHT: 61 IN

## 2022-03-08 DIAGNOSIS — R03.0 ELEVATED BLOOD PRESSURE READING WITHOUT DIAGNOSIS OF HYPERTENSION: ICD-10-CM

## 2022-03-08 DIAGNOSIS — Z00.01 ENCOUNTER FOR ROUTINE ADULT MEDICAL EXAM WITH ABNORMAL FINDINGS: Primary | ICD-10-CM

## 2022-03-08 DIAGNOSIS — E11.9 TYPE 2 DIABETES MELLITUS WITHOUT COMPLICATION, WITHOUT LONG-TERM CURRENT USE OF INSULIN (H): ICD-10-CM

## 2022-03-08 PROBLEM — E66.9 OBESITY (BMI 30-39.9): Status: ACTIVE | Noted: 2022-03-08

## 2022-03-08 LAB — HBA1C MFR BLD: 7.1 % (ref 0–5.6)

## 2022-03-08 PROCEDURE — 83036 HEMOGLOBIN GLYCOSYLATED A1C: CPT | Performed by: INTERNAL MEDICINE

## 2022-03-08 PROCEDURE — 36415 COLL VENOUS BLD VENIPUNCTURE: CPT | Performed by: INTERNAL MEDICINE

## 2022-03-08 PROCEDURE — 99207 PR FOOT EXAM NO CHARGE: CPT | Mod: 25 | Performed by: INTERNAL MEDICINE

## 2022-03-08 PROCEDURE — 99214 OFFICE O/P EST MOD 30 MIN: CPT | Mod: 25 | Performed by: INTERNAL MEDICINE

## 2022-03-08 PROCEDURE — 99396 PREV VISIT EST AGE 40-64: CPT | Performed by: INTERNAL MEDICINE

## 2022-03-08 RX ORDER — FOLIC ACID/MULTIVIT,IRON,MINER 0.4MG-18MG
TABLET ORAL
COMMUNITY
Start: 2022-02-14 | End: 2023-09-15

## 2022-03-08 RX ORDER — ATORVASTATIN CALCIUM 10 MG/1
10 TABLET, FILM COATED ORAL DAILY
Qty: 90 TABLET | Refills: 3 | Status: SHIPPED | OUTPATIENT
Start: 2022-03-08 | End: 2023-03-07

## 2022-03-08 NOTE — NURSING NOTE
Provider verbally informed of abnormal vital sign readings.   Madison Soliman, ROSALIA on 3/8/2022 at 3:10 PM

## 2022-03-08 NOTE — PATIENT INSTRUCTIONS
HgbA1c today; recommendations to follow.    Blood pressure is elevated. Please check blood pressures at home (automated machine with upper arm cuff). Goal blood pressure is 130s/80s or less.    Blood pressure follow-up with diabetes follow-up in 3-6 months (based on HgbA1c).    Please start LOW dose atorvastatin 10mg daily for elevated LDL level.    Please schedule annual diabetic eye exam:    [facility] [place] [facility]   [address]   [place] [address]   [place] [address]   Phone: [phone]         ---

## 2022-03-08 NOTE — PROGRESS NOTES
ASSESSMENT/PLAN                                                       (Z00.01) Encounter for routine adult medical exam with abnormal findings  (primary encounter diagnosis)  Comment: PMH, PSH, FH, SH, medications, allergies, immunizations, and preventative health measures reviewed and updated as appropriate.  Plan: see below for plans.      (E11.9) Type 2 diabetes mellitus without complication, without long-term current use of insulin (H)  Plan: HgbA1c today; recommendations to follow; referred for annual diabetic eye exam - patient to schedule; START atorvastatin 10 mg daily.    (R03.0) Elevated blood pressure reading without diagnosis of hypertension  Comment: Asymptomatic; patient declines treatment at this time.  Plan: blood pressure follow-up with next visit (in 3 to 6 months pending HgbA1c results).    Amy Ruiz MD   [address] [address]  T: [phone], F: [phone]    SUBJECTIVE                                                      Martha Neal is a very pleasant 61 year old female who presents for a physical.    Patient's blood pressure is noted to be elevated today, even on repeat. No history of or treatment for high blood pressure in the past. She is asymptomatic from a blood pressure perspective: no chest pain or palpitations, no shortness of breath, no light-headedness or dizziness, no headaches or vision changes. Patient declines treatment at this time.    Patient's diabetes is diet controlled. She believes that she is adherent to her diabetic diet (declines visit with diabetes educator). No diabetic complications. Annual diabetic eye exam is NOT up to date. She is checking her feet regularly. Her pertinent vaccinations (Pneumovax, influenza) are NOT up to date. She is on a daily baby aspirin. She is NOT on a statin and an ACE-I or ARB.     ROS:  Constitutional: no unintentional weight loss or gain reported; no fevers, chills, or sweats  reported  Cardiovascular: no chest pain, palpitations, or edema reported  Respiratory: no cough, wheezing, shortness of breath, or dyspnea on exertion reported  Gastrointestinal: no nausea, vomiting, constipation, diarrhea, or abdominal pain reported  Genitourinary: no urinary frequency, urgency, dysuria, or hematuria reported  Integumentary: no rash or pruritus reported  Musculoskeletal: no back pain, muscle pain, joint pain, or joint swelling reported  Neurologic: no focal weakness, numbness, or tingling reported  Hematologic: no easy bruising or bleeding reported  Endocrine: no heat or cold intolerance reported; no polyuria or polydipsia reported  Psychiatric: no anxiety or depression reported     Past Medical History:   Diagnosis Date     Obesity (BMI 30-39.9)      Type 2 diabetes mellitus (H)      Past Surgical History:   Procedure Laterality Date     APPENDECTOMY      incidental during hysterectomy     HYSTERECTOMY, PAP NO LONGER INDICATED      fibroids + ovarian cysts; TAHBSO     Family History   Problem Relation Age of Onset     Coronary Artery Disease Father         MI and CABG in his 60s     Cerebrovascular Disease Father         60s/70s     Hyperlipidemia Father      Breast Cancer Father      Diabetes No family hx of      Coronary Artery Disease Early Onset No family hx of      Ovarian Cancer No family hx of      Colon Cancer No family hx of      Social History     Occupational History     Occupation: [employer] - [employer]   Tobacco Use     Smoking status: Current Every Day Smoker     Packs/day: 1.00     Years: 45.00     Pack years: 45.00     Types: Cigarettes     Smokeless tobacco: Never Used     Tobacco comment: 45 years (as of 2022); 5-6 cigs/day (as of 2022)   Substance and Sexual Activity     Alcohol use: No     Drug use: No     Sexual activity: Not Currently   Other Topics Concern     Parent/sibling w/ CABG, MI or angioplasty before 65F 55M? No   Social History Narrative    Single.    No kids.    Walks  "2-4 miles/day.     No Known Allergies     Current Outpatient Medications   Medication Sig     aspirin (ASA) 81 MG EC tablet Take 1 tablet (81 mg) by mouth daily     atorvastatin (LIPITOR) 10 MG tablet Take 1 tablet (10 mg) by mouth daily     calcium carbonate-vitamin D (OSCAL W/D) 500-200 MG-UNIT tablet Take 1 tablet by mouth daily     cholecalciferol 25 MCG (1000 UT) TABS Take 1,000 Units by mouth daily     Chromium-Cinnamon (CINNAMON PLUS CHROMIUM) 100-500 MCG-MG CAPS      Omega-3 Fatty Acids (FISH OIL) 1200 MG CAPS Take 1 capsule by mouth daily      vitamin C (ASCORBIC ACID) 500 MG tablet Take 500 mg by mouth daily     Immunization History   Administered Date(s) Administered     COVIDACACIA Garber,Pooja 04/06/2021     COVID-ACACIA Krueger,Pfizer (12+ Yrs) 10/26/2021     Pneumococcal 23 valent 01/13/2022     Tdap (Adacel,Boostrix) 04/26/2014     Zoster vaccine recombinant adjuvanted (SHINGRIX) 01/13/2022     PREVENTATIVE HEALTH                                                      BMI: obese  Blood pressure: elevated - not on medication  Breast CA screening: up to date   Cervical CA screening: n/a   Colon CA screening: up to date   Lung CA screening: up to date   Dexa: not medically indicated at this time   Screening cholesterol: n/a - already being treated for this condition  Screening diabetes: n/a - already being treated for this condition  STD testing: not sexually active  Alcohol misuse screening: alcohol use reviewed - no intervention indicated at this time  Immunizations: reviewed; flu shot DUE - patient declines    OBJECTIVE                                                      BP (!) 152/88   Pulse 95   Temp 97.5  F (36.4  C) (Temporal)   Resp 16   Ht 1.556 m (5' 1.25\")   Wt 73.5 kg (162 lb)   LMP  (LMP Unknown)   SpO2 99%   BMI 30.36 kg/m    Constitutional: well-appearing  Head, Ears, and Eyes: normocephalic; normal external auditory canal and pinna; tympanic membranes visualized and normal; normal lids and " conjunctivae  Neck: supple, symmetric, no thyromegaly or lymphadenopathy  Respiratory: normal respiratory effort; clear to auscultation bilaterally  Cardiovascular: regular rate and rhythm; no edema  Gastrointestinal: soft, non-tender, and non-distended; no organomegaly or masses  Musculoskeletal: normal gait and station  Foot exam: feet intact - no lesions or ulcers; sensation intact to monofilament  Psych: normal judgment and insight; normal mood and affect; recent and remote memory intact    ---  (Note was completed, in part, with [employer] voice-recognition software. Documentation was reviewed, but some grammatical, spelling, and word errors may remain.)

## 2022-03-23 ENCOUNTER — TRANSFERRED RECORDS (OUTPATIENT)
Dept: HEALTH INFORMATION MANAGEMENT | Facility: CLINIC | Age: 62
End: 2022-03-23
Payer: COMMERCIAL

## 2022-03-23 LAB — RETINOPATHY: NEGATIVE

## 2022-11-20 ENCOUNTER — HEALTH MAINTENANCE LETTER (OUTPATIENT)
Age: 62
End: 2022-11-20

## 2023-03-01 DIAGNOSIS — E11.9 TYPE 2 DIABETES MELLITUS WITHOUT COMPLICATION, WITHOUT LONG-TERM CURRENT USE OF INSULIN (H): ICD-10-CM

## 2023-03-01 RX ORDER — ATORVASTATIN CALCIUM 10 MG/1
10 TABLET, FILM COATED ORAL DAILY
Qty: 90 TABLET | Refills: 3 | OUTPATIENT
Start: 2023-03-01

## 2023-03-02 NOTE — TELEPHONE ENCOUNTER
Reschedule annual with PCP (me) then route back for refills.    ---    May use any virtual or virtual release spot for an in-person visit.     Patient may also be seen at noon (arrival time 11:40am) Mondays, Wednesdays, Thursdays, and Fridays OR at 1:00pm (arrival time 12:40pm) on Thursdays (during the huddle).    Please do not schedule in a same day, next day, or hospital follow-up slot.    Okay to double book lunch slot (but patient should still arrive by 11:40am).

## 2023-03-07 RX ORDER — ATORVASTATIN CALCIUM 10 MG/1
10 TABLET, FILM COATED ORAL DAILY
Qty: 90 TABLET | Refills: 3 | Status: SHIPPED | OUTPATIENT
Start: 2023-03-07 | End: 2023-03-16

## 2023-03-08 ASSESSMENT — ENCOUNTER SYMPTOMS
FREQUENCY: 0
WEAKNESS: 0
MYALGIAS: 0
ARTHRALGIAS: 0
SORE THROAT: 0
FEVER: 0
HEADACHES: 0
NAUSEA: 0
JOINT SWELLING: 0
PALPITATIONS: 0
DYSURIA: 0
CHILLS: 0
DIZZINESS: 0
HEARTBURN: 0
PARESTHESIAS: 0
HEMATOCHEZIA: 0
BREAST MASS: 0
CONSTIPATION: 0
COUGH: 0
NERVOUS/ANXIOUS: 0
HEMATURIA: 0
SHORTNESS OF BREATH: 0
ABDOMINAL PAIN: 0
DIARRHEA: 0
EYE PAIN: 0

## 2023-03-15 ENCOUNTER — OFFICE VISIT (OUTPATIENT)
Dept: INTERNAL MEDICINE | Facility: CLINIC | Age: 63
End: 2023-03-15
Payer: COMMERCIAL

## 2023-03-15 VITALS
SYSTOLIC BLOOD PRESSURE: 142 MMHG | OXYGEN SATURATION: 99 % | TEMPERATURE: 98 F | HEART RATE: 75 BPM | DIASTOLIC BLOOD PRESSURE: 88 MMHG | WEIGHT: 158 LBS | BODY MASS INDEX: 29.61 KG/M2

## 2023-03-15 DIAGNOSIS — E11.9 TYPE 2 DIABETES MELLITUS WITHOUT COMPLICATION, WITHOUT LONG-TERM CURRENT USE OF INSULIN (H): ICD-10-CM

## 2023-03-15 DIAGNOSIS — Z00.00 ROUTINE HISTORY AND PHYSICAL EXAMINATION OF ADULT: Primary | ICD-10-CM

## 2023-03-15 DIAGNOSIS — Z87.891 PERSONAL HISTORY OF TOBACCO USE, PRESENTING HAZARDS TO HEALTH: ICD-10-CM

## 2023-03-15 DIAGNOSIS — Z23 NEED FOR VACCINATION: ICD-10-CM

## 2023-03-15 DIAGNOSIS — Z23 HIGH PRIORITY FOR 2019-NCOV VACCINE: ICD-10-CM

## 2023-03-15 DIAGNOSIS — Z12.11 SPECIAL SCREENING FOR MALIGNANT NEOPLASMS, COLON: ICD-10-CM

## 2023-03-15 DIAGNOSIS — Z12.31 ENCOUNTER FOR SCREENING MAMMOGRAM FOR BREAST CANCER: ICD-10-CM

## 2023-03-15 PROBLEM — R03.0 WHITE COAT SYNDROME WITHOUT HYPERTENSION: Status: ACTIVE | Noted: 2023-03-15

## 2023-03-15 PROBLEM — E66.9 OBESITY (BMI 30-39.9): Status: RESOLVED | Noted: 2022-03-08 | Resolved: 2023-03-15

## 2023-03-15 LAB
ALBUMIN SERPL BCG-MCNC: 4.4 G/DL (ref 3.5–5.2)
ALP SERPL-CCNC: 85 U/L (ref 35–104)
ALT SERPL W P-5'-P-CCNC: 15 U/L (ref 10–35)
ANION GAP SERPL CALCULATED.3IONS-SCNC: 12 MMOL/L (ref 7–15)
AST SERPL W P-5'-P-CCNC: 18 U/L (ref 10–35)
BILIRUB SERPL-MCNC: 0.5 MG/DL
BUN SERPL-MCNC: 15.5 MG/DL (ref 8–23)
CALCIUM SERPL-MCNC: 10.2 MG/DL (ref 8.8–10.2)
CHLORIDE SERPL-SCNC: 106 MMOL/L (ref 98–107)
CHOLEST SERPL-MCNC: 185 MG/DL
CREAT SERPL-MCNC: 0.79 MG/DL (ref 0.51–0.95)
CREAT UR-MCNC: 59.5 MG/DL
DEPRECATED HCO3 PLAS-SCNC: 24 MMOL/L (ref 22–29)
ERYTHROCYTE [DISTWIDTH] IN BLOOD BY AUTOMATED COUNT: 15 % (ref 10–15)
GFR SERPL CREATININE-BSD FRML MDRD: 83 ML/MIN/1.73M2
GLUCOSE SERPL-MCNC: 127 MG/DL (ref 70–99)
HBA1C MFR BLD: 7.6 % (ref 0–5.6)
HCT VFR BLD AUTO: 44.5 % (ref 35–47)
HDLC SERPL-MCNC: 83 MG/DL
HGB BLD-MCNC: 14.3 G/DL (ref 11.7–15.7)
LDLC SERPL CALC-MCNC: 87 MG/DL
MCH RBC QN AUTO: 30.4 PG (ref 26.5–33)
MCHC RBC AUTO-ENTMCNC: 32.1 G/DL (ref 31.5–36.5)
MCV RBC AUTO: 95 FL (ref 78–100)
MICROALBUMIN UR-MCNC: <12 MG/L
MICROALBUMIN/CREAT UR: NORMAL MG/G{CREAT}
NONHDLC SERPL-MCNC: 102 MG/DL
PLATELET # BLD AUTO: 217 10E3/UL (ref 150–450)
POTASSIUM SERPL-SCNC: 4.1 MMOL/L (ref 3.4–5.3)
PROT SERPL-MCNC: 7 G/DL (ref 6.4–8.3)
RBC # BLD AUTO: 4.7 10E6/UL (ref 3.8–5.2)
SODIUM SERPL-SCNC: 142 MMOL/L (ref 136–145)
TRIGL SERPL-MCNC: 76 MG/DL
WBC # BLD AUTO: 7.1 10E3/UL (ref 4–11)

## 2023-03-15 PROCEDURE — 36415 COLL VENOUS BLD VENIPUNCTURE: CPT | Performed by: INTERNAL MEDICINE

## 2023-03-15 PROCEDURE — 90471 IMMUNIZATION ADMIN: CPT | Performed by: INTERNAL MEDICINE

## 2023-03-15 PROCEDURE — 90750 HZV VACC RECOMBINANT IM: CPT | Performed by: INTERNAL MEDICINE

## 2023-03-15 PROCEDURE — 99396 PREV VISIT EST AGE 40-64: CPT | Mod: 25 | Performed by: INTERNAL MEDICINE

## 2023-03-15 PROCEDURE — 0124A COVID-19 VACCINE BIVALENT BOOSTER 12+ (PFIZER): CPT | Performed by: INTERNAL MEDICINE

## 2023-03-15 PROCEDURE — 83036 HEMOGLOBIN GLYCOSYLATED A1C: CPT | Performed by: INTERNAL MEDICINE

## 2023-03-15 PROCEDURE — 85027 COMPLETE CBC AUTOMATED: CPT | Performed by: INTERNAL MEDICINE

## 2023-03-15 PROCEDURE — 82570 ASSAY OF URINE CREATININE: CPT | Performed by: INTERNAL MEDICINE

## 2023-03-15 PROCEDURE — 82043 UR ALBUMIN QUANTITATIVE: CPT | Performed by: INTERNAL MEDICINE

## 2023-03-15 PROCEDURE — 80053 COMPREHEN METABOLIC PANEL: CPT | Performed by: INTERNAL MEDICINE

## 2023-03-15 PROCEDURE — 80061 LIPID PANEL: CPT | Performed by: INTERNAL MEDICINE

## 2023-03-15 PROCEDURE — 91312 COVID-19 VACCINE BIVALENT BOOSTER 12+ (PFIZER): CPT | Performed by: INTERNAL MEDICINE

## 2023-03-15 NOTE — PROGRESS NOTES
ASSESSMENT/PLAN                                                       (Z00.00) Routine history and physical examination of adult  (primary encounter diagnosis)  Comment: PMH, PSH, FH, SH, medications, allergies, immunizations, and preventative health measures reviewed and updated as appropriate.  Plan: see below for plans.      (E11.9) Type 2 diabetes mellitus without complication, without long-term current use of insulin (H)  Plan: nonfasting diabetic labs and urine microalbumin today; recommendations to follow; referred for annual diabetic eye exam  - patient will be contacted to schedule.      (Z23) Need for vaccination  Plan: Shingrix #2 given today.    (Z23) High priority for 2019-nCoV vaccine  Plan: COVID-19 booster given today.    (Z12.31) Encounter for screening mammogram for breast cancer  Plan: screening mammogram ordered - patient to schedule.     (Z12.11) Special screening for malignant neoplasms, colon  Plan: Cologuard ordered.    (Z87.891) Personal history of tobacco use, presenting hazards to health  Plan: CT Chest Lung Cancer Screen Low Dose Without ordered - patient to schedule.     Amy Ruiz MD   [address] [address]  T: [phone], F: [phone]    SUBJECTIVE                                                      Martha Neal is a very pleasant 62 year old female who presents for a physical.    ROS:  Constitutional: no unintentional weight loss or gain reported; no fevers, chills, or sweats reported  Cardiovascular: no chest pain, palpitations, or edema reported  Respiratory: no cough, wheezing, shortness of breath, or dyspnea on exertion reported  Gastrointestinal: no nausea, vomiting, constipation, diarrhea, or abdominal pain reported  Genitourinary: no urinary frequency, urgency, dysuria, or hematuria reported  Integumentary: no rash or pruritus reported  Musculoskeletal: no back pain, muscle pain, joint pain, or joint swelling  reported  Neurologic: no focal weakness, numbness, or tingling reported  Hematologic: no easy bruising or bleeding reported  Endocrine: no heat or cold intolerance reported; no polyuria or polydipsia reported  Psychiatric: no anxiety or depression reported    Past Medical History:   Diagnosis Date     Type 2 diabetes mellitus (H)      White coat syndrome without hypertension      Past Surgical History:   Procedure Laterality Date     APPENDECTOMY      incidental during hysterectomy     HYSTERECTOMY, PAP NO LONGER INDICATED      fibroids + ovarian cysts; TAHBSO     Family History   Problem Relation Age of Onset     Coronary Artery Disease Father         MI and CABG in his 60s     Cerebrovascular Disease Father         60s/70s     Hyperlipidemia Father      Breast Cancer Father      Diabetes No family hx of      Coronary Artery Disease Early Onset No family hx of      Ovarian Cancer No family hx of      Colon Cancer No family hx of      Social History     Occupational History     Occupation: Retired - [employer]   Tobacco Use     Smoking status: Every Day     Packs/day: 1.00     Years: 46.00     Pack years: 46.00     Types: Cigarettes     Smokeless tobacco: Never     Tobacco comments:     46 years (as of 2023); 5-6 cigs/day (as of 2023)   Vaping Use     Vaping Use: Never used   Substance and Sexual Activity     Alcohol use: No     Drug use: No     Sexual activity: Not Currently   Social History Narrative    Single.    No kids.    Walks 4 miles/day.     No Known Allergies     Current Outpatient Medications   Medication Sig     aspirin (ASA) 81 MG EC tablet Take 1 tablet (81 mg) by mouth daily     atorvastatin (LIPITOR) 10 MG tablet Take 1 tablet (10 mg) by mouth daily     calcium carbonate-vitamin D (OSCAL W/D) 500-200 MG-UNIT tablet Take 1 tablet by mouth daily     cholecalciferol 25 MCG (1000 UT) TABS Take 1,000 Units by mouth daily     Chromium-Cinnamon (CINNAMON PLUS CHROMIUM) 100-500 MCG-MG CAPS      Omega-3 Fatty  Acids (FISH OIL) 1200 MG CAPS Take 1 capsule by mouth daily      vitamin C (ASCORBIC ACID) 500 MG tablet Take 500 mg by mouth daily     Immunization History   Administered Date(s) Administered     COVID-19 Vaccine (Pooja) 04/06/2021     COVID-19 Vaccine 12+ (Pfizer 2022) 06/16/2022     COVID-19 Vaccine 12+ (Pfizer) 10/26/2021     COVID-19 Vaccine Bivalent Booster 12+ (Pfizer) 03/15/2023     Pneumococcal 23 valent 01/13/2022     Tdap (Adacel,Boostrix) 04/26/2014     Zoster vaccine recombinant adjuvanted (SHINGRIX) 01/13/2022, 03/15/2023     PREVENTATIVE HEALTH                                                      BMI: overweight  Blood pressure: elevated - not on medication (blood pressure readings at home are within normal limits)  Breast CA screening: DUE  Cervical CA screening: n/a   Colon CA screening: DUE  Lung CA screening: DUE  Dexa: not medically indicated at this time   Screening cholesterol: n/a - already being treated for this condition  Screening diabetes: n/a - already being treated for this condition  STD testing: not sexually active  Alcohol misuse screening: alcohol use reviewed - no intervention indicated at this time  Immunizations: reviewed; Shingrix #2 and COVID-19 booster DUE and Prevnar 20 DUE - patient declines    OBJECTIVE                                                      BP (!) 142/88   Pulse 75   Temp 98  F (36.7  C) (Tympanic)   Wt 71.7 kg (158 lb)   LMP  (LMP Unknown)   SpO2 99%   BMI 29.61 kg/m    Constitutional: well-appearing  Head, Ears, and Eyes: normocephalic; normal external auditory canal and pinna; tympanic membranes visualized and normal; normal lids and conjunctivae  Neck: supple, symmetric, no thyromegaly or lymphadenopathy  Respiratory: normal respiratory effort; clear to auscultation bilaterally  Cardiovascular: regular rate and rhythm; no edema  Gastrointestinal: soft, non-tender, and non-distended; no organomegaly or masses  Musculoskeletal: normal gait and  station  Psych: normal judgment and insight; normal mood and affect; recent and remote memory intact    ---  (Note was completed, in part, with [employer] voice-recognition software. Documentation was reviewed, but some grammatical, spelling, and word errors may remain.)

## 2023-03-15 NOTE — PATIENT INSTRUCTIONS
COVID-19 booster and shingles vaccine today.    Please stop at the  to schedule your mammogram on the way out.    Nonfasting labs and urine sample today.  Recommendations to follow.    ---    Please call [phone] to schedule your CT lung cancer screening.    You will be contacted to schedule your annual diabetic eye exam. If you don't hear from a representative within 2 business days, please call [phone].    Cologuard (stool screen for colon cancer) will be mailed to your home.

## 2023-03-21 ENCOUNTER — ANCILLARY PROCEDURE (OUTPATIENT)
Dept: MAMMOGRAPHY | Facility: CLINIC | Age: 63
End: 2023-03-21
Attending: INTERNAL MEDICINE
Payer: COMMERCIAL

## 2023-03-21 DIAGNOSIS — Z12.31 VISIT FOR SCREENING MAMMOGRAM: ICD-10-CM

## 2023-03-21 DIAGNOSIS — Z12.31 ENCOUNTER FOR SCREENING MAMMOGRAM FOR BREAST CANCER: ICD-10-CM

## 2023-03-21 PROCEDURE — 77067 SCR MAMMO BI INCL CAD: CPT | Mod: TC | Performed by: STUDENT IN AN ORGANIZED HEALTH CARE EDUCATION/TRAINING PROGRAM

## 2023-03-27 ENCOUNTER — TRANSFERRED RECORDS (OUTPATIENT)
Dept: INTERNAL MEDICINE | Facility: CLINIC | Age: 63
End: 2023-03-27
Payer: COMMERCIAL

## 2023-03-27 LAB — RETINOPATHY: NEGATIVE

## 2023-03-29 DIAGNOSIS — R19.5 POSITIVE COLORECTAL CANCER SCREENING USING COLOGUARD TEST: Primary | ICD-10-CM

## 2023-03-29 LAB — NONINV COLON CA DNA+OCC BLD SCRN STL QL: POSITIVE

## 2023-06-01 ENCOUNTER — OFFICE VISIT (OUTPATIENT)
Dept: URGENT CARE | Facility: URGENT CARE | Age: 63
End: 2023-06-01
Payer: COMMERCIAL

## 2023-06-01 VITALS
HEART RATE: 82 BPM | BODY MASS INDEX: 29.61 KG/M2 | SYSTOLIC BLOOD PRESSURE: 161 MMHG | WEIGHT: 158 LBS | TEMPERATURE: 98.8 F | DIASTOLIC BLOOD PRESSURE: 88 MMHG | RESPIRATION RATE: 18 BRPM | OXYGEN SATURATION: 98 %

## 2023-06-01 DIAGNOSIS — S50.811A ABRASION OF RIGHT FOREARM, INITIAL ENCOUNTER: ICD-10-CM

## 2023-06-01 DIAGNOSIS — S80.211A ABRASION, KNEE, RIGHT, INITIAL ENCOUNTER: Primary | ICD-10-CM

## 2023-06-01 PROCEDURE — 99213 OFFICE O/P EST LOW 20 MIN: CPT | Performed by: PHYSICIAN ASSISTANT

## 2023-06-01 NOTE — PROGRESS NOTES
URGENT CARE VISIT:    SUBJECTIVE:   Chief Complaint   Patient presents with     Urgent Care     Fell today and injured her right knee and right arm       Martha Neal is a 63 year old female who presents with a chief complaint of right knee and arm pain.  Symptoms began today when she tripped on side of curb. Pain exacerbated by movement. Relieved by rest.  She treated it initially with alcohol on wounds. This is the first time this type of injury has occurred to this patient.     PMH:   Past Medical History:   Diagnosis Date     Type 2 diabetes mellitus (H)      White coat syndrome without hypertension      Allergies: Patient has no known allergies.   Medications:   Current Outpatient Medications   Medication Sig Dispense Refill     aspirin (ASA) 81 MG EC tablet Take 1 tablet (81 mg) by mouth daily 90 tablet 3     atorvastatin (LIPITOR) 20 MG tablet Take 1 tablet (20 mg) by mouth daily 90 tablet 3     calcium carbonate-vitamin D (OSCAL W/D) 500-200 MG-UNIT tablet Take 1 tablet by mouth daily       cholecalciferol 25 MCG (1000 UT) TABS Take 1,000 Units by mouth daily       Chromium-Cinnamon (CINNAMON PLUS CHROMIUM) 100-500 MCG-MG CAPS        metFORMIN (GLUCOPHAGE XR) 500 MG 24 hr tablet Take 1 tablet (500 mg) by mouth daily (with dinner) for 14 days, THEN 2 tablets (1,000 mg) daily (with dinner) for 76 days. 166 tablet 0     Omega-3 Fatty Acids (FISH OIL) 1200 MG CAPS Take 1 capsule by mouth daily        vitamin C (ASCORBIC ACID) 500 MG tablet Take 500 mg by mouth daily       Social History:   Social History     Tobacco Use     Smoking status: Every Day     Packs/day: 1.00     Years: 46.00     Pack years: 46.00     Types: Cigarettes     Smokeless tobacco: Never     Tobacco comments:     46 years (as of 2023); 5-6 cigs/day (as of 2023)   Vaping Use     Vaping status: Never Used   Substance Use Topics     Alcohol use: No       ROS:  Review of systems negative except as stated above.    OBJECTIVE:  BP (!) 161/88    Pulse 82   Temp 98.8  F (37.1  C)   Resp 18   Wt 71.7 kg (158 lb)   LMP  (LMP Unknown)   SpO2 98%   BMI 29.61 kg/m    GENERAL APPEARANCE: healthy, alert and no distress  MUSCULOSKELETAL: mild TTP over right patella. Gait is normal. No TTP over right forearm or elbow. FROM right shoulder, elbow, and wrist.   EXTREMITIES: peripheral pulses normal  SKIN: multiple small abrasions on right forearm and elbow. One coalesced abrasion on right patella with mild bleeding  NEURO: sensation intact       ASSESSMENT:    ICD-10-CM    1. Abrasion, knee, right, initial encounter  S80.211A       2. Abrasion of right forearm, initial encounter  S50.811A           PLAN:  Patient Instructions   Patient was educated on the natural course of injury. Low suspicion for fracture as exam is benign. Keep wound dry and clean. Wash area with soap and water. Watch for signs of infection such as redness or purulent drainage. Conservative measures discussed including over-the-counter Tylenol as needed for pain. See your primary care provider in 5 days if there is no improvement or sooner as needed. Seek emergency care if you develop fever, streaking, severe pain or rapidly spreading redness.     Patient verbalized understanding and is agreeable to plan. The patient was discharged ambulatory and in stable condition.    Anusha Garcia PA-C on 6/1/2023 at 11:58 AM

## 2023-06-04 DIAGNOSIS — E11.9 TYPE 2 DIABETES MELLITUS WITHOUT COMPLICATION, WITHOUT LONG-TERM CURRENT USE OF INSULIN (H): ICD-10-CM

## 2023-06-05 RX ORDER — METFORMIN HCL 500 MG
TABLET, EXTENDED RELEASE 24 HR ORAL
Qty: 166 TABLET | Refills: 0 | Status: SHIPPED | OUTPATIENT
Start: 2023-06-05 | End: 2023-06-28

## 2023-06-12 ENCOUNTER — PATIENT OUTREACH (OUTPATIENT)
Dept: GERIATRIC MEDICINE | Facility: CLINIC | Age: 63
End: 2023-06-12
Payer: COMMERCIAL

## 2023-06-12 NOTE — PROGRESS NOTES
[facility] Care Coordination Contact    Member became effective with  [facility] on 6/1/2023 with Mathieu MSC+.  Previous Health Plan: MA/Fee For Service  Previous Care System: n/a  Previous care coordinators name and number: n/a  Waiver Type: N/A  Last MMIS Entry: Date n/a and Type n/a  MMIS visit date (and type) if different from above: n/a  Services Listed in MMIS:   UTF received: No UTF to request  Address/Phone discrepancy:   [facility] has phone #[phone]    Adeola Em  Case Management Specialist  [facility]  [phone]

## 2023-06-12 NOTE — LETTER
June 12, 2023    MARTHA SOLORZANO  [address]  [place] [address]    Dear  Martha,    Welcome to [facility] s MSC+ health program. My name is Lourdes Jackson RN. I am your MSC+ care coordinator. You are eligible for Care Coordination through [facility] MSC+ plan.    As your care coordinator, we ll:  Meet to go over your care coordination benefits  Talk about your physical and mental health care needs   Review your preventative care needs  Create a plan that meets your needs with the services you choose    What happens next?  I ll call you soon to introduce myself and tell you more about my role. We ll then plan time to go over your health and safety needs. Our goal is to keep you as healthy and independent as possible.    Soon, you will receive a new MSC+ member identification (ID) card from [facility]. When you receive it, please use this card along with your [place] [facility] card and Prescription Drug Coverage Program card. When you receive, it please use this card where you get your health services. If you have Medicare, you will need to show your Medicare card when you get health services.    The MSC+ care coordination program is voluntary and offered to you at no cost. If you wish to stop being in the care coordination program or have questions, call me at [phone]. If you reach my voicemail, leave a message and your phone number. TTY users, call the [place] [employer] at 228 or 1-235.629.2045 (brilzr-ze-qohdmy relay service).    Sincerely,      Lourdes Jackson RN    Phone: [phone]   E-mail: [email or web address]    [id number] accepted   [id number]       [id number] (07/2022)

## 2023-06-15 ENCOUNTER — PATIENT OUTREACH (OUTPATIENT)
Dept: GERIATRIC MEDICINE | Facility: CLINIC | Age: 63
End: 2023-06-15
Payer: COMMERCIAL

## 2023-06-15 NOTE — Clinical Note
Dr. Ruiz, I am this patient's new care coordinator with [facility] [place]. I have communicated with Martha and she does not feel the need to complete an annual health risk assessment at this time. I will reach out to her in 6 months to follow up. If there are any questions or concerns, please don't hesitate to reach out.  Thank you for your time,  Lourdes Jackson RN [facility] [place] [phone]

## 2023-06-15 NOTE — PROGRESS NOTES
[facility] Care Coordination Contact    CC received a return call from the member stating she requested from the [facility] last month to be taken off MA. She then received a letter from the [facility], dated 5/8/2023, to inform the member her MA has been closed.    CC informed CMS supervisor of this who has reached out to the health plan.    Lourdes Jackson RN  [facility]  [phone]

## 2023-06-20 NOTE — PROGRESS NOTES
"[facility] Care Coordination Contact    CC called member to inform her that [facility] is sending this issue to the  to investigate how MA was reopened. CMS supervisor sent a message to [facility] to inform them of the issue and asked what they request from [facility], which is to continue following protocol, per usual. [facility] is also forwarding this issue to their Clinical Liaison to see if they are aware of this \"unique issue\".   Member was informed this will be treated as a \"refusal of home annual health risk assessment\" and she will be receiving a letter in the mail from [facility] stating so. This will not effect her coverage, we are just proceeding as usual.   Member verbalizes understanding.    Lourdes Jackson RN  [facility]  [phone]    "

## 2023-06-28 RX ORDER — METFORMIN HCL 500 MG
1000 TABLET, EXTENDED RELEASE 24 HR ORAL
Qty: 180 TABLET | Refills: 1 | Status: SHIPPED | OUTPATIENT
Start: 2023-06-28 | End: 2024-03-07

## 2023-06-28 NOTE — TELEPHONE ENCOUNTER
Pt called stating the metformin was refilled with old sig:     metFORMIN (GLUCOPHAGE XR) 500 MG 24 hr tablet 166 tablet 0 6/5/2023  No   Sig: TAKE 1 TABLET DAILY WITH DINNER FOR 14 DAYS, THEN 2 TABLETS DAILY WITH DINNER FOR 76 DAYS.     She did pick it up and will be taking 2 tablets daily, noting she will be 14 days short. Advised to continue taking 2 tablets daily and call clinic when she runs out so Dr. Ruiz can send an Rx with correct sig then.

## 2023-07-08 ENCOUNTER — HEALTH MAINTENANCE LETTER (OUTPATIENT)
Age: 63
End: 2023-07-08

## 2023-08-04 ENCOUNTER — PATIENT OUTREACH (OUTPATIENT)
Dept: GERIATRIC MEDICINE | Facility: CLINIC | Age: 63
End: 2023-08-04
Payer: COMMERCIAL

## 2023-08-04 NOTE — PROGRESS NOTES
[facility] Care Coordination Contact    No longer active with [facility] community case management effective 07/31/2023.  Reason for community disenrollment: Other:  Member changed to Medicare Advantage.    Lourdes Jackson RN  [facility]  [phone]

## 2023-08-30 DIAGNOSIS — E11.9 TYPE 2 DIABETES MELLITUS WITHOUT COMPLICATION, WITHOUT LONG-TERM CURRENT USE OF INSULIN (H): ICD-10-CM

## 2023-08-30 RX ORDER — METFORMIN HCL 500 MG
TABLET, EXTENDED RELEASE 24 HR ORAL
Qty: 166 TABLET | OUTPATIENT
Start: 2023-08-30

## 2023-08-30 NOTE — TELEPHONE ENCOUNTER
Pt called stating she got a notification from pharmacy stating metformin medication was denied by pharmacy d/t early refill. Pt states she picked up medication recently and still has 90 day supply on hand. No further action needed at this time.  CHAPIS FANG RN on 8/30/2023 at 2:42 PM

## 2023-09-15 ENCOUNTER — OFFICE VISIT (OUTPATIENT)
Dept: INTERNAL MEDICINE | Facility: CLINIC | Age: 63
End: 2023-09-15
Payer: COMMERCIAL

## 2023-09-15 VITALS
WEIGHT: 144.8 LBS | OXYGEN SATURATION: 98 % | SYSTOLIC BLOOD PRESSURE: 134 MMHG | BODY MASS INDEX: 27.34 KG/M2 | DIASTOLIC BLOOD PRESSURE: 80 MMHG | RESPIRATION RATE: 18 BRPM | HEIGHT: 61 IN | HEART RATE: 89 BPM

## 2023-09-15 DIAGNOSIS — K11.20 INFECTION OF PAROTID GLAND: Primary | ICD-10-CM

## 2023-09-15 DIAGNOSIS — R60.0 SWELLING OF LEFT PAROTID GLAND: ICD-10-CM

## 2023-09-15 PROCEDURE — 99213 OFFICE O/P EST LOW 20 MIN: CPT | Performed by: PHYSICIAN ASSISTANT

## 2023-09-15 NOTE — PROGRESS NOTES
"  Assessment & Plan     Infection of parotid gland    - amoxicillin-clavulanate (AUGMENTIN) 875-125 MG tablet; Take 1 tablet by mouth 2 times daily  - Adult ENT  Referral; Future    Swelling of left parotid gland    - amoxicillin-clavulanate (AUGMENTIN) 875-125 MG tablet; Take 1 tablet by mouth 2 times daily  - Adult ENT  Referral; Future             Nicotine/Tobacco Cessation:  She reports that she has been smoking cigarettes. She has a 46.00 pack-year smoking history. She has never used smokeless tobacco.  Nicotine/Tobacco Cessation Plan:   Per PCP      BMI:   Estimated body mass index is 27.14 kg/m  as calculated from the following:    Height as of this encounter: 1.556 m (5' 1.25\").    Weight as of this encounter: 65.7 kg (144 lb 12.8 oz).       See Patient Instructions    OCHOA De Anda [facility] GEORGIE Thomas is a 63 year old, presenting for the following health issues:  Mouth Problem      History of Present Illness       Reason for visit:  Blocked salivary gland  Symptom onset:  1-3 days ago  Symptom intensity:  Moderate  Symptom progression:  Staying the same  Had these symptoms before:  Yes  What makes it worse:  No  What makes it better:  Warm compress    She eats 0-1 servings of fruits and vegetables daily.She consumes 1 sweetened beverage(s) daily.She exercises with enough effort to increase her heart rate 60 or more minutes per day.  She exercises with enough effort to increase her heart rate 5 days per week.   She is taking medications regularly.               Review of Systems   Constitutional, HEENT, cardiovascular, pulmonary, gi and gu systems are negative, except as otherwise noted.      Objective    /80   Pulse 89   Resp 18   Ht 1.556 m (5' 1.25\")   Wt 65.7 kg (144 lb 12.8 oz)   LMP  (LMP Unknown)   SpO2 98%   BMI 27.14 kg/m    Body mass index is 27.14 kg/m .  Physical Exam   GENERAL: healthy, alert and no " distress  HENT: normal cephalic/atraumatic, oropharynx clear, oral mucous membranes moist, and swelling left parotid gland and to the submandibular glands/ lymphnodes on the left   NECK: swelling left submandibular area   RESP: lungs clear to auscultation - no rales, rhonchi or wheezes  CV: regular rates and rhythm  MS: no gross musculoskeletal defects noted, no edema  SKIN: no suspicious lesions or rashes

## 2023-09-16 ENCOUNTER — HEALTH MAINTENANCE LETTER (OUTPATIENT)
Age: 63
End: 2023-09-16

## 2024-02-14 ENCOUNTER — PATIENT OUTREACH (OUTPATIENT)
Dept: CARE COORDINATION | Facility: CLINIC | Age: 64
End: 2024-02-14
Payer: COMMERCIAL

## 2024-02-20 ENCOUNTER — PATIENT OUTREACH (OUTPATIENT)
Dept: CARE COORDINATION | Facility: CLINIC | Age: 64
End: 2024-02-20
Payer: COMMERCIAL

## 2024-02-22 DIAGNOSIS — E11.9 TYPE 2 DIABETES MELLITUS WITHOUT COMPLICATION, WITHOUT LONG-TERM CURRENT USE OF INSULIN (H): ICD-10-CM

## 2024-02-22 RX ORDER — ATORVASTATIN CALCIUM 20 MG/1
20 TABLET, FILM COATED ORAL DAILY
Qty: 90 TABLET | Refills: 0 | Status: SHIPPED | OUTPATIENT
Start: 2024-02-22 | End: 2024-04-01

## 2024-02-23 DIAGNOSIS — E11.9 TYPE 2 DIABETES MELLITUS WITHOUT COMPLICATION, WITHOUT LONG-TERM CURRENT USE OF INSULIN (H): ICD-10-CM

## 2024-02-23 RX ORDER — METFORMIN HCL 500 MG
1000 TABLET, EXTENDED RELEASE 24 HR ORAL
Qty: 180 TABLET | Refills: 1 | OUTPATIENT
Start: 2024-02-23

## 2024-02-23 NOTE — TELEPHONE ENCOUNTER
She is overdue for her diabetes follow-up. Please ask her to schedule this appointment ASAP. Can refill medication temporarily if she anticipates running out prior to scheduled appointment.     Thank you.     ---    May use any virtual or virtual release spot for an in-person visit.     Patient may also be seen at noon (arrival time 11:40am) Mondays, Wednesdays, Thursdays, and Fridays OR at 1:00pm (arrival time 12:40pm) on Thursdays (during the huddle).    Please do not schedule in a same day, next day, or hospital follow-up slot.    Okay to double book lunch slot (but patient should still arrive by 11:40am).

## 2024-02-27 ENCOUNTER — TELEPHONE (OUTPATIENT)
Dept: INTERNAL MEDICINE | Facility: CLINIC | Age: 64
End: 2024-02-27
Payer: COMMERCIAL

## 2024-02-27 NOTE — TELEPHONE ENCOUNTER
Medication Question or Refill    Contacts         Type Contact Phone/Fax    02/27/2024 11:10 AM CST Phone (Incoming) Martha Neal (Self) [phone] (H)            What medication are you calling about (include dose and sig)?: Metformin    Preferred Pharmacy:   [facility] [address] [place] [facility] [address]   [address] [place] [facility] [place] [address]  Phone: [phone] Fax: [phone]      Controlled Substance Agreement on file:   CSA -- Patient Level:    CSA: None found at the patient level.       Who prescribed the medication?: PCP    Do you need a refill? Yes    When did you use the medication last? Yesterday    Patient offered an appointment? Yes: None available with PCP until July. Is scheduled to see other provider on 3/20/24 but only has a few weeks left.    Do you have any questions or concerns?  No      Could we send this information to you in [employer] or would you prefer to receive a phone call?:   Patient would prefer a phone call   Okay to leave a detailed message?: Yes at Home number on file [phone] (home)

## 2024-02-28 ENCOUNTER — PATIENT OUTREACH (OUTPATIENT)
Dept: CARE COORDINATION | Facility: CLINIC | Age: 64
End: 2024-02-28
Payer: COMMERCIAL

## 2024-03-07 DIAGNOSIS — E11.9 TYPE 2 DIABETES MELLITUS WITHOUT COMPLICATION, WITHOUT LONG-TERM CURRENT USE OF INSULIN (H): ICD-10-CM

## 2024-03-07 RX ORDER — METFORMIN HCL 500 MG
1000 TABLET, EXTENDED RELEASE 24 HR ORAL
Qty: 180 TABLET | Refills: 1 | Status: SHIPPED | OUTPATIENT
Start: 2024-03-07 | End: 2024-04-01

## 2024-03-19 ENCOUNTER — PATIENT OUTREACH (OUTPATIENT)
Dept: CARE COORDINATION | Facility: CLINIC | Age: 64
End: 2024-03-19
Payer: COMMERCIAL

## 2024-04-01 ENCOUNTER — OFFICE VISIT (OUTPATIENT)
Dept: INTERNAL MEDICINE | Facility: CLINIC | Age: 64
End: 2024-04-01
Payer: COMMERCIAL

## 2024-04-01 VITALS
TEMPERATURE: 98.3 F | BODY MASS INDEX: 26.56 KG/M2 | HEIGHT: 60 IN | HEART RATE: 77 BPM | WEIGHT: 135.3 LBS | SYSTOLIC BLOOD PRESSURE: 161 MMHG | OXYGEN SATURATION: 98 % | DIASTOLIC BLOOD PRESSURE: 81 MMHG

## 2024-04-01 DIAGNOSIS — Z23 NEED FOR VACCINATION: ICD-10-CM

## 2024-04-01 DIAGNOSIS — Z12.31 ENCOUNTER FOR SCREENING MAMMOGRAM FOR BREAST CANCER: ICD-10-CM

## 2024-04-01 DIAGNOSIS — E11.9 TYPE 2 DIABETES MELLITUS WITHOUT COMPLICATION, WITHOUT LONG-TERM CURRENT USE OF INSULIN (H): ICD-10-CM

## 2024-04-01 DIAGNOSIS — Z00.00 WELCOME TO MEDICARE PREVENTIVE VISIT: Primary | ICD-10-CM

## 2024-04-01 DIAGNOSIS — E55.9 VITAMIN D DEFICIENCY: ICD-10-CM

## 2024-04-01 DIAGNOSIS — Z87.891 PERSONAL HISTORY OF TOBACCO USE, PRESENTING HAZARDS TO HEALTH: ICD-10-CM

## 2024-04-01 DIAGNOSIS — Z78.0 ASYMPTOMATIC MENOPAUSE: ICD-10-CM

## 2024-04-01 DIAGNOSIS — R03.0 WHITE COAT SYNDROME WITHOUT HYPERTENSION: ICD-10-CM

## 2024-04-01 LAB
ALBUMIN SERPL BCG-MCNC: 4.5 G/DL (ref 3.5–5.2)
ALP SERPL-CCNC: 92 U/L (ref 40–150)
ALT SERPL W P-5'-P-CCNC: 30 U/L (ref 0–50)
ANION GAP SERPL CALCULATED.3IONS-SCNC: 11 MMOL/L (ref 7–15)
AST SERPL W P-5'-P-CCNC: 26 U/L (ref 0–45)
BILIRUB SERPL-MCNC: 0.5 MG/DL
BUN SERPL-MCNC: 16.1 MG/DL (ref 8–23)
CALCIUM SERPL-MCNC: 10.1 MG/DL (ref 8.8–10.2)
CHLORIDE SERPL-SCNC: 105 MMOL/L (ref 98–107)
CHOLEST SERPL-MCNC: 128 MG/DL
CREAT SERPL-MCNC: 0.73 MG/DL (ref 0.51–0.95)
CREAT UR-MCNC: 84.8 MG/DL
DEPRECATED HCO3 PLAS-SCNC: 27 MMOL/L (ref 22–29)
EGFRCR SERPLBLD CKD-EPI 2021: >90 ML/MIN/1.73M2
ERYTHROCYTE [DISTWIDTH] IN BLOOD BY AUTOMATED COUNT: 14 % (ref 10–15)
FASTING STATUS PATIENT QL REPORTED: NO
GLUCOSE SERPL-MCNC: 132 MG/DL (ref 70–99)
HBA1C MFR BLD: 7.1 % (ref 0–5.6)
HCT VFR BLD AUTO: 44.5 % (ref 35–47)
HDLC SERPL-MCNC: 74 MG/DL
HGB BLD-MCNC: 14.2 G/DL (ref 11.7–15.7)
LDLC SERPL CALC-MCNC: 43 MG/DL
MCH RBC QN AUTO: 30.7 PG (ref 26.5–33)
MCHC RBC AUTO-ENTMCNC: 31.9 G/DL (ref 31.5–36.5)
MCV RBC AUTO: 96 FL (ref 78–100)
MICROALBUMIN UR-MCNC: 16.7 MG/L
MICROALBUMIN/CREAT UR: 19.69 MG/G CR (ref 0–25)
NONHDLC SERPL-MCNC: 54 MG/DL
PLATELET # BLD AUTO: 206 10E3/UL (ref 150–450)
POTASSIUM SERPL-SCNC: 4.1 MMOL/L (ref 3.4–5.3)
PROT SERPL-MCNC: 7.1 G/DL (ref 6.4–8.3)
RBC # BLD AUTO: 4.62 10E6/UL (ref 3.8–5.2)
SODIUM SERPL-SCNC: 143 MMOL/L (ref 135–145)
TRIGL SERPL-MCNC: 54 MG/DL
VIT D+METAB SERPL-MCNC: 62 NG/ML (ref 20–50)
WBC # BLD AUTO: 6.6 10E3/UL (ref 4–11)

## 2024-04-01 PROCEDURE — 85027 COMPLETE CBC AUTOMATED: CPT | Performed by: INTERNAL MEDICINE

## 2024-04-01 PROCEDURE — G0009 ADMIN PNEUMOCOCCAL VACCINE: HCPCS | Performed by: INTERNAL MEDICINE

## 2024-04-01 PROCEDURE — 82043 UR ALBUMIN QUANTITATIVE: CPT | Performed by: INTERNAL MEDICINE

## 2024-04-01 PROCEDURE — 82570 ASSAY OF URINE CREATININE: CPT | Performed by: INTERNAL MEDICINE

## 2024-04-01 PROCEDURE — 80053 COMPREHEN METABOLIC PANEL: CPT | Performed by: INTERNAL MEDICINE

## 2024-04-01 PROCEDURE — 99207 PR FOOT EXAM NO CHARGE: CPT | Performed by: INTERNAL MEDICINE

## 2024-04-01 PROCEDURE — 83036 HEMOGLOBIN GLYCOSYLATED A1C: CPT | Performed by: INTERNAL MEDICINE

## 2024-04-01 PROCEDURE — 36415 COLL VENOUS BLD VENIPUNCTURE: CPT | Performed by: INTERNAL MEDICINE

## 2024-04-01 PROCEDURE — G0402 INITIAL PREVENTIVE EXAM: HCPCS | Performed by: INTERNAL MEDICINE

## 2024-04-01 PROCEDURE — 82306 VITAMIN D 25 HYDROXY: CPT | Performed by: INTERNAL MEDICINE

## 2024-04-01 PROCEDURE — 90677 PCV20 VACCINE IM: CPT | Performed by: INTERNAL MEDICINE

## 2024-04-01 PROCEDURE — 80061 LIPID PANEL: CPT | Performed by: INTERNAL MEDICINE

## 2024-04-01 PROCEDURE — 99214 OFFICE O/P EST MOD 30 MIN: CPT | Mod: 25 | Performed by: INTERNAL MEDICINE

## 2024-04-01 RX ORDER — ATORVASTATIN CALCIUM 20 MG/1
20 TABLET, FILM COATED ORAL DAILY
Qty: 90 TABLET | Refills: 0 | Status: SHIPPED | OUTPATIENT
Start: 2024-04-01 | End: 2024-08-14

## 2024-04-01 RX ORDER — METFORMIN HCL 500 MG
1000 TABLET, EXTENDED RELEASE 24 HR ORAL
Qty: 180 TABLET | Refills: 1 | Status: SHIPPED | OUTPATIENT
Start: 2024-04-01

## 2024-04-01 SDOH — HEALTH STABILITY: PHYSICAL HEALTH: ON AVERAGE, HOW MANY DAYS PER WEEK DO YOU ENGAGE IN MODERATE TO STRENUOUS EXERCISE (LIKE A BRISK WALK)?: 5 DAYS

## 2024-04-01 ASSESSMENT — SOCIAL DETERMINANTS OF HEALTH (SDOH): HOW OFTEN DO YOU GET TOGETHER WITH FRIENDS OR RELATIVES?: ONCE A WEEK

## 2024-04-01 NOTE — PROGRESS NOTES
ASSESSMENT/PLAN                                                       (Z00.00) Welcome to Medicare preventive visit  (primary encounter diagnosis)  Comment: PMH, PSH, FH, SH, medications, allergies, immunizations, and preventative health measures reviewed and updated as appropriate.  Plan: see below for plans.      (E11.9) Type 2 diabetes mellitus without complication, without long-term current use of insulin (H)  Plan: non-fasting labs today; recommendations to follow.    (E55.9) Vitamin D deficiency  Plan: vitamin D level today.     (Z23) Need for vaccination  Comment: Prevnar 20 given today.    (Z78.0) Asymptomatic menopause  Plan: DEXA ordered - patient to schedule.     (Z12.31) Encounter for screening mammogram for breast cancer  Plan: screening mammogram ordered - patient to schedule.     (Z87.891) Personal history of tobacco use, presenting hazards to health  Plan: CT Chest Lung Cancer Screen Low Dose Without ordered - patient to schedule.     (R03.0) White coat syndrome without hypertension  Comment: patient reports normal blood pressures at home.   Plan: patient to continue to monitor blood pressures at home and contact MD if readings are elevated.     Appropriate preventive services were discussed with this patient, including applicable screening as appropriate for cardiovascular disease, diabetes, osteopenia/osteoporosis, and glaucoma.  As appropriate for age/gender, discussed screening for colorectal cancer, prostate cancer, breast cancer, and cervical cancer.     Reviewed patients plan of care and provided an AVS. The Basic Care Plan (routine screening as documented in Health Maintenance) for Deepika meets the Care Plan requirement. This Care Plan has been established and reviewed with the Patient.    Amy Ruiz MD   [address] [address]  T: [phone], F: [phone]    BRAYAN Neal  is a very pleasant 63 year old female who presents for her Welcome to Medicare visit:    PMH, PSH, FH, SH, medications, allergies, immunizations, preventative health, and health risk assessment reviewed and updated as appropriate.    Past Medical History:   Diagnosis Date    Type 2 diabetes mellitus (H)     White coat syndrome without hypertension      Past Surgical History:   Procedure Laterality Date    APPENDECTOMY      incidental during hysterectomy    HYSTERECTOMY, PAP NO LONGER INDICATED      fibroids + ovarian cysts; TAHBSO     Family History   Problem Relation Age of Onset    Coronary Artery Disease Father         MI and CABG in his 60s    Cerebrovascular Disease Father         60s/70s    Hyperlipidemia Father     Breast Cancer Father     Diabetes No family hx of     Coronary Artery Disease Early Onset No family hx of     Ovarian Cancer No family hx of     Colon Cancer No family hx of      Social History     Occupational History    Occupation: Retired - [employer]   Tobacco Use    Smoking status: Every Day     Packs/day: 1.00     Years: 47.00     Additional pack years: 0.00     Total pack years: 47.00     Types: Cigarettes    Smokeless tobacco: Never    Tobacco comments:     47 years (as of 2024); 12 cigs/day (as of 2024); 1ppd at most   Vaping Use    Vaping Use: Never used   Substance and Sexual Activity    Alcohol use: No    Drug use: No    Sexual activity: Not Currently   Social History Narrative    Single.    No kids.    Walks 4 miles/day.     No Known Allergies    Current Outpatient Medications   Medication Sig    aspirin (ASA) 81 MG EC tablet Take 1 tablet (81 mg) by mouth daily    atorvastatin (LIPITOR) 20 MG tablet TAKE 1 TABLET BY MOUTH EVERY DAY    calcium carbonate-vitamin D (OSCAL W/D) 500-200 MG-UNIT tablet Take 1 tablet by mouth daily    cholecalciferol 25 MCG (1000 UT) TABS Take 1,000 Units by mouth daily    metFORMIN (GLUCOPHAGE XR) 500 MG 24 hr tablet TAKE 2 TABLETS BY MOUTH DAILY WITH DINNER     vitamin C (ASCORBIC ACID) 500 MG tablet Take 500 mg by mouth daily     Immunization History   Administered Date(s) Administered    COVID-19 12+ (2023-24) (Pfizer) 10/26/2023    COVID-19 Bivalent 12+ (Pfizer) 03/15/2023    COVID-19 MONOVALENT 12+ (Pfizer) 10/26/2021    COVID-19 Monovalent 12+ (Pfizer 2022) 06/16/2022    COVID-19 Vaccine (Pooja) 04/06/2021    Pneumococcal 23 valent 01/13/2022    TDAP (Adacel,Boostrix) 04/26/2014    Zoster recombinant adjuvanted (SHINGRIX) 01/13/2022, 03/15/2023     Have you ever done Advance Care Planning? (For example, a Health Directive, POLST, or a discussion with a medical provider or your loved ones about your wishes): Yes    PREVENTATIVE HEALTH                                                      BMI: within normal limits   Blood pressure: elevated - not on medication - known white coat hypertension  Breast CA screening: DUE  Colon CA screening: DUE - patient declines  Lung CA screening: DUE  Dexa: DUE  Screening cholesterol: n/a - already being treated for this condition  Screening diabetes: n/a - already being treated for this condition  Alcohol misuse screening: alcohol use reviewed - no intervention indicated at this time  Immunizations: reviewed;  Prevnar 20 DUE    HEALTH RISK ASSESSMENT                                                      In general, how would you rate your overall physical health? good  Outside of work, how many days during the week do you exercise? 5 days/week  Outside of work, approximately how many minutes a day do you exercise? 30-45 minutes    If you drink alcohol do you typically have >3 drinks per day or >7 drinks per week? No     Assistance with daily activities: No    Safety concerns: No    Fall risk assessment: completed today (see ambulatory assessments)    Hearing concerns: No    In the past 6 months, have you been bothered by leaking of urine: No    Do you have a current opioid prescription? No  Do you use any other controlled substances  "or medications that are not prescribed by a provider? None    PHQ-2/PHQ-9 assessment: completed today (see ambulatory assessments)    Additional concerns today: No    OBJECTIVE                                                      BP (!) 161/81   Pulse 77   Temp 98.3  F (36.8  C) (Temporal)   Ht 1.518 m (4' 11.75\")   Wt 61.4 kg (135 lb 4.8 oz)   LMP  (LMP Unknown)   SpO2 98%   BMI 26.65 kg/m    Constitutional: well-appearing  Head, Ears, and Eyes: normocephalic; normal external auditory canal and pinna; tympanic membranes visualized and normal; normal lids and conjunctivae  Neck: supple, symmetric, no thyromegaly or lymphadenopathy  Respiratory: normal respiratory effort; clear to auscultation bilaterally  Cardiovascular: regular rate and rhythm; no edema  Gastrointestinal: soft, non-tender, and non-distended; no organomegaly or masses  Musculoskeletal: normal gait and station  Foot exam: feet intact - no lesions or ulcers; sensation intact to monofilament  Psych: normal judgment and insight; normal mood and affect; recent and remote memory intact    Visual Acuity:  deferred - patient will be seeing eye provider in the near future     ---    (Note was completed, in part, with [email or web address] voice-recognition software. Documentation was reviewed, but some grammatical, spelling, and word errors may remain.)  "

## 2024-04-03 ENCOUNTER — TRANSFERRED RECORDS (OUTPATIENT)
Dept: HEALTH INFORMATION MANAGEMENT | Facility: CLINIC | Age: 64
End: 2024-04-03
Payer: COMMERCIAL

## 2024-04-03 LAB — RETINOPATHY: NEGATIVE

## 2024-05-09 ENCOUNTER — ANCILLARY PROCEDURE (OUTPATIENT)
Dept: MAMMOGRAPHY | Facility: CLINIC | Age: 64
End: 2024-05-09
Attending: INTERNAL MEDICINE
Payer: COMMERCIAL

## 2024-05-09 ENCOUNTER — ANCILLARY PROCEDURE (OUTPATIENT)
Dept: BONE DENSITY | Facility: CLINIC | Age: 64
End: 2024-05-09
Attending: INTERNAL MEDICINE
Payer: COMMERCIAL

## 2024-05-09 DIAGNOSIS — Z12.31 VISIT FOR SCREENING MAMMOGRAM: ICD-10-CM

## 2024-05-09 DIAGNOSIS — Z12.31 ENCOUNTER FOR SCREENING MAMMOGRAM FOR BREAST CANCER: ICD-10-CM

## 2024-05-09 DIAGNOSIS — Z78.0 ASYMPTOMATIC MENOPAUSE: ICD-10-CM

## 2024-05-09 PROCEDURE — 77080 DXA BONE DENSITY AXIAL: CPT | Mod: TC

## 2024-05-09 PROCEDURE — 77067 SCR MAMMO BI INCL CAD: CPT | Mod: TC | Performed by: RADIOLOGY

## 2024-07-05 ENCOUNTER — APPOINTMENT (OUTPATIENT)
Dept: GENERAL RADIOLOGY | Facility: CLINIC | Age: 64
DRG: 389 | End: 2024-07-05
Payer: COMMERCIAL

## 2024-07-05 ENCOUNTER — APPOINTMENT (OUTPATIENT)
Dept: CT IMAGING | Facility: CLINIC | Age: 64
DRG: 389 | End: 2024-07-05
Attending: EMERGENCY MEDICINE
Payer: COMMERCIAL

## 2024-07-05 ENCOUNTER — HOSPITAL ENCOUNTER (INPATIENT)
Facility: CLINIC | Age: 64
LOS: 4 days | Discharge: HOME | DRG: 389 | End: 2024-07-09
Attending: EMERGENCY MEDICINE | Admitting: STUDENT IN AN ORGANIZED HEALTH CARE EDUCATION/TRAINING PROGRAM
Payer: COMMERCIAL

## 2024-07-05 DIAGNOSIS — K56.609 SMALL BOWEL OBSTRUCTION (H): ICD-10-CM

## 2024-07-05 DIAGNOSIS — R09.82 PND (POST-NASAL DRIP): Primary | ICD-10-CM

## 2024-07-05 DIAGNOSIS — K52.9 COLITIS: ICD-10-CM

## 2024-07-05 LAB
ABO/RH(D): NORMAL
ALBUMIN SERPL BCG-MCNC: 4.3 G/DL (ref 3.5–5.2)
ALBUMIN UR-MCNC: NEGATIVE MG/DL
ALP SERPL-CCNC: 89 U/L (ref 40–150)
ALT SERPL W P-5'-P-CCNC: 22 U/L (ref 0–50)
ANION GAP SERPL CALCULATED.3IONS-SCNC: 10 MMOL/L (ref 7–15)
ANTIBODY SCREEN: NEGATIVE
APPEARANCE UR: CLEAR
APTT PPP: 28 SECONDS (ref 22–38)
AST SERPL W P-5'-P-CCNC: 18 U/L (ref 0–45)
BASOPHILS # BLD AUTO: 0 10E3/UL (ref 0–0.2)
BASOPHILS NFR BLD AUTO: 0 %
BILIRUB SERPL-MCNC: 0.8 MG/DL
BILIRUB UR QL STRIP: NEGATIVE
BUN SERPL-MCNC: 21.1 MG/DL (ref 8–23)
CALCIUM SERPL-MCNC: 10.1 MG/DL (ref 8.8–10.2)
CHLORIDE SERPL-SCNC: 100 MMOL/L (ref 98–107)
COLOR UR AUTO: ABNORMAL
CREAT SERPL-MCNC: 0.81 MG/DL (ref 0.51–0.95)
CRP SERPL-MCNC: 6.57 MG/L
DEPRECATED HCO3 PLAS-SCNC: 29 MMOL/L (ref 22–29)
EGFRCR SERPLBLD CKD-EPI 2021: 80 ML/MIN/1.73M2
EOSINOPHIL # BLD AUTO: 0 10E3/UL (ref 0–0.7)
EOSINOPHIL NFR BLD AUTO: 0 %
ERYTHROCYTE [DISTWIDTH] IN BLOOD BY AUTOMATED COUNT: 13.7 % (ref 10–15)
GLUCOSE BLDC GLUCOMTR-MCNC: 163 MG/DL (ref 70–99)
GLUCOSE BLDC GLUCOMTR-MCNC: 200 MG/DL (ref 70–99)
GLUCOSE SERPL-MCNC: 177 MG/DL (ref 70–99)
GLUCOSE UR STRIP-MCNC: NEGATIVE MG/DL
HCT VFR BLD AUTO: 45.8 % (ref 35–47)
HGB BLD-MCNC: 15.6 G/DL (ref 11.7–15.7)
HGB UR QL STRIP: NEGATIVE
IMM GRANULOCYTES # BLD: 0 10E3/UL
IMM GRANULOCYTES NFR BLD: 0 %
INR PPP: 1.05 (ref 0.85–1.15)
KETONES UR STRIP-MCNC: NEGATIVE MG/DL
LACTATE SERPL-SCNC: 1 MMOL/L (ref 0.7–2)
LEUKOCYTE ESTERASE UR QL STRIP: NEGATIVE
LIPASE SERPL-CCNC: 58 U/L (ref 13–60)
LYMPHOCYTES # BLD AUTO: 1.4 10E3/UL (ref 0.8–5.3)
LYMPHOCYTES NFR BLD AUTO: 14 %
MAGNESIUM SERPL-MCNC: 2.1 MG/DL (ref 1.7–2.3)
MCH RBC QN AUTO: 31.7 PG (ref 26.5–33)
MCHC RBC AUTO-ENTMCNC: 34.1 G/DL (ref 31.5–36.5)
MCV RBC AUTO: 93 FL (ref 78–100)
MONOCYTES # BLD AUTO: 0.9 10E3/UL (ref 0–1.3)
MONOCYTES NFR BLD AUTO: 9 %
NEUTROPHILS # BLD AUTO: 7.5 10E3/UL (ref 1.6–8.3)
NEUTROPHILS NFR BLD AUTO: 76 %
NITRATE UR QL: NEGATIVE
NRBC # BLD AUTO: 0 10E3/UL
NRBC BLD AUTO-RTO: 0 /100
PH UR STRIP: 8 [PH] (ref 5–7)
PHOSPHATE SERPL-MCNC: 2.1 MG/DL (ref 2.5–4.5)
PLATELET # BLD AUTO: 218 10E3/UL (ref 150–450)
POTASSIUM SERPL-SCNC: 3.9 MMOL/L (ref 3.4–5.3)
PROT SERPL-MCNC: 7.2 G/DL (ref 6.4–8.3)
RBC # BLD AUTO: 4.92 10E6/UL (ref 3.8–5.2)
RBC URINE: <1 /HPF
SODIUM SERPL-SCNC: 139 MMOL/L (ref 135–145)
SP GR UR STRIP: 1 (ref 1–1.03)
SPECIMEN EXPIRATION DATE: NORMAL
UROBILINOGEN UR STRIP-MCNC: NORMAL MG/DL
WBC # BLD AUTO: 9.9 10E3/UL (ref 4–11)
WBC URINE: <1 /HPF

## 2024-07-05 PROCEDURE — 258N000003 HC RX IP 258 OP 636

## 2024-07-05 PROCEDURE — 83690 ASSAY OF LIPASE: CPT | Performed by: EMERGENCY MEDICINE

## 2024-07-05 PROCEDURE — 999N000065 XR ABDOMEN PORT 1 VIEW

## 2024-07-05 PROCEDURE — 250N000011 HC RX IP 250 OP 636: Performed by: EMERGENCY MEDICINE

## 2024-07-05 PROCEDURE — 250N000011 HC RX IP 250 OP 636

## 2024-07-05 PROCEDURE — 86900 BLOOD TYPING SEROLOGIC ABO: CPT

## 2024-07-05 PROCEDURE — 87040 BLOOD CULTURE FOR BACTERIA: CPT

## 2024-07-05 PROCEDURE — 96376 TX/PRO/DX INJ SAME DRUG ADON: CPT

## 2024-07-05 PROCEDURE — 83735 ASSAY OF MAGNESIUM: CPT

## 2024-07-05 PROCEDURE — 81001 URINALYSIS AUTO W/SCOPE: CPT | Performed by: EMERGENCY MEDICINE

## 2024-07-05 PROCEDURE — 96374 THER/PROPH/DIAG INJ IV PUSH: CPT

## 2024-07-05 PROCEDURE — 93005 ELECTROCARDIOGRAM TRACING: CPT

## 2024-07-05 PROCEDURE — 83605 ASSAY OF LACTIC ACID: CPT

## 2024-07-05 PROCEDURE — 250N000009 HC RX 250: Performed by: EMERGENCY MEDICINE

## 2024-07-05 PROCEDURE — 85610 PROTHROMBIN TIME: CPT

## 2024-07-05 PROCEDURE — 258N000003 HC RX IP 258 OP 636: Performed by: EMERGENCY MEDICINE

## 2024-07-05 PROCEDURE — 80053 COMPREHEN METABOLIC PANEL: CPT | Performed by: EMERGENCY MEDICINE

## 2024-07-05 PROCEDURE — 85730 THROMBOPLASTIN TIME PARTIAL: CPT

## 2024-07-05 PROCEDURE — 74177 CT ABD & PELVIS W/CONTRAST: CPT

## 2024-07-05 PROCEDURE — 99223 1ST HOSP IP/OBS HIGH 75: CPT

## 2024-07-05 PROCEDURE — 85048 AUTOMATED LEUKOCYTE COUNT: CPT | Performed by: EMERGENCY MEDICINE

## 2024-07-05 PROCEDURE — 36415 COLL VENOUS BLD VENIPUNCTURE: CPT | Performed by: EMERGENCY MEDICINE

## 2024-07-05 PROCEDURE — 250N000009 HC RX 250

## 2024-07-05 PROCEDURE — 250N000013 HC RX MED GY IP 250 OP 250 PS 637: Performed by: EMERGENCY MEDICINE

## 2024-07-05 PROCEDURE — 99285 EMERGENCY DEPT VISIT HI MDM: CPT | Mod: 25

## 2024-07-05 PROCEDURE — 99222 1ST HOSP IP/OBS MODERATE 55: CPT | Mod: GC | Performed by: SURGERY

## 2024-07-05 PROCEDURE — 250N000012 HC RX MED GY IP 250 OP 636 PS 637

## 2024-07-05 PROCEDURE — 84100 ASSAY OF PHOSPHORUS: CPT

## 2024-07-05 PROCEDURE — 120N000001 HC R&B MED SURG/OB

## 2024-07-05 PROCEDURE — 86140 C-REACTIVE PROTEIN: CPT | Performed by: EMERGENCY MEDICINE

## 2024-07-05 PROCEDURE — 36415 COLL VENOUS BLD VENIPUNCTURE: CPT

## 2024-07-05 PROCEDURE — 96361 HYDRATE IV INFUSION ADD-ON: CPT

## 2024-07-05 RX ORDER — ONDANSETRON 2 MG/ML
4 INJECTION INTRAMUSCULAR; INTRAVENOUS EVERY 30 MIN PRN
Status: DISCONTINUED | OUTPATIENT
Start: 2024-07-05 | End: 2024-07-05

## 2024-07-05 RX ORDER — NALOXONE HYDROCHLORIDE 0.4 MG/ML
0.2 INJECTION, SOLUTION INTRAMUSCULAR; INTRAVENOUS; SUBCUTANEOUS
Status: DISCONTINUED | OUTPATIENT
Start: 2024-07-05 | End: 2024-07-09 | Stop reason: HOSPADM

## 2024-07-05 RX ORDER — HYDROMORPHONE HCL IN WATER/PF 6 MG/30 ML
0.4 PATIENT CONTROLLED ANALGESIA SYRINGE INTRAVENOUS
Status: DISCONTINUED | OUTPATIENT
Start: 2024-07-05 | End: 2024-07-09 | Stop reason: HOSPADM

## 2024-07-05 RX ORDER — AMOXICILLIN 250 MG
1 CAPSULE ORAL 2 TIMES DAILY PRN
Status: DISCONTINUED | OUTPATIENT
Start: 2024-07-05 | End: 2024-07-09 | Stop reason: HOSPADM

## 2024-07-05 RX ORDER — ONDANSETRON 2 MG/ML
4 INJECTION INTRAMUSCULAR; INTRAVENOUS EVERY 6 HOURS PRN
Status: DISCONTINUED | OUTPATIENT
Start: 2024-07-05 | End: 2024-07-09 | Stop reason: HOSPADM

## 2024-07-05 RX ORDER — ACETAMINOPHEN 650 MG/1
650 SUPPOSITORY RECTAL EVERY 4 HOURS PRN
Status: DISCONTINUED | OUTPATIENT
Start: 2024-07-05 | End: 2024-07-09 | Stop reason: HOSPADM

## 2024-07-05 RX ORDER — POLYETHYLENE GLYCOL 3350 17 G/17G
17 POWDER, FOR SOLUTION ORAL 2 TIMES DAILY PRN
Status: DISCONTINUED | OUTPATIENT
Start: 2024-07-05 | End: 2024-07-09 | Stop reason: HOSPADM

## 2024-07-05 RX ORDER — CALCIUM CARBONATE 500 MG/1
1000 TABLET, CHEWABLE ORAL 4 TIMES DAILY PRN
Status: DISCONTINUED | OUTPATIENT
Start: 2024-07-05 | End: 2024-07-09 | Stop reason: HOSPADM

## 2024-07-05 RX ORDER — LIDOCAINE HYDROCHLORIDE 20 MG/ML
10 JELLY TOPICAL ONCE
Status: COMPLETED | OUTPATIENT
Start: 2024-07-05 | End: 2024-07-05

## 2024-07-05 RX ORDER — LIDOCAINE 40 MG/G
CREAM TOPICAL
Status: DISCONTINUED | OUTPATIENT
Start: 2024-07-05 | End: 2024-07-09 | Stop reason: HOSPADM

## 2024-07-05 RX ORDER — ATORVASTATIN CALCIUM 20 MG/1
20 TABLET, FILM COATED ORAL DAILY
Status: DISCONTINUED | OUTPATIENT
Start: 2024-07-05 | End: 2024-07-09 | Stop reason: HOSPADM

## 2024-07-05 RX ORDER — AMOXICILLIN 250 MG
2 CAPSULE ORAL 2 TIMES DAILY PRN
Status: DISCONTINUED | OUTPATIENT
Start: 2024-07-05 | End: 2024-07-09 | Stop reason: HOSPADM

## 2024-07-05 RX ORDER — HYDROMORPHONE HYDROCHLORIDE 1 MG/ML
0.5 INJECTION, SOLUTION INTRAMUSCULAR; INTRAVENOUS; SUBCUTANEOUS ONCE
Status: COMPLETED | OUTPATIENT
Start: 2024-07-05 | End: 2024-07-05

## 2024-07-05 RX ORDER — ACETAMINOPHEN 325 MG/1
650 TABLET ORAL EVERY 4 HOURS PRN
Status: DISCONTINUED | OUTPATIENT
Start: 2024-07-05 | End: 2024-07-09 | Stop reason: HOSPADM

## 2024-07-05 RX ORDER — DEXTROSE MONOHYDRATE 25 G/50ML
25-50 INJECTION, SOLUTION INTRAVENOUS
Status: DISCONTINUED | OUTPATIENT
Start: 2024-07-05 | End: 2024-07-09 | Stop reason: HOSPADM

## 2024-07-05 RX ORDER — HYDROMORPHONE HCL IN WATER/PF 6 MG/30 ML
0.2 PATIENT CONTROLLED ANALGESIA SYRINGE INTRAVENOUS
Status: DISCONTINUED | OUTPATIENT
Start: 2024-07-05 | End: 2024-07-09 | Stop reason: HOSPADM

## 2024-07-05 RX ORDER — CALCIUM CARBONATE/VITAMIN D3 600 MG-10
2 TABLET ORAL DAILY
COMMUNITY

## 2024-07-05 RX ORDER — ASPIRIN 81 MG/1
81 TABLET ORAL DAILY
Status: DISCONTINUED | OUTPATIENT
Start: 2024-07-05 | End: 2024-07-09 | Stop reason: HOSPADM

## 2024-07-05 RX ORDER — NICOTINE POLACRILEX 4 MG
15-30 LOZENGE BUCCAL
Status: DISCONTINUED | OUTPATIENT
Start: 2024-07-05 | End: 2024-07-09 | Stop reason: HOSPADM

## 2024-07-05 RX ORDER — MAGNESIUM HYDROXIDE/ALUMINUM HYDROXICE/SIMETHICONE 120; 1200; 1200 MG/30ML; MG/30ML; MG/30ML
30 SUSPENSION ORAL ONCE
Status: DISCONTINUED | OUTPATIENT
Start: 2024-07-05 | End: 2024-07-05

## 2024-07-05 RX ORDER — ONDANSETRON 4 MG/1
4 TABLET, ORALLY DISINTEGRATING ORAL EVERY 6 HOURS PRN
Status: DISCONTINUED | OUTPATIENT
Start: 2024-07-05 | End: 2024-07-09 | Stop reason: HOSPADM

## 2024-07-05 RX ORDER — NICOTINE 21 MG/24HR
1 PATCH, TRANSDERMAL 24 HOURS TRANSDERMAL DAILY PRN
Status: DISCONTINUED | OUTPATIENT
Start: 2024-07-05 | End: 2024-07-09 | Stop reason: HOSPADM

## 2024-07-05 RX ORDER — HYDRALAZINE HYDROCHLORIDE 20 MG/ML
10 INJECTION INTRAMUSCULAR; INTRAVENOUS EVERY 4 HOURS PRN
Status: DISCONTINUED | OUTPATIENT
Start: 2024-07-05 | End: 2024-07-09 | Stop reason: HOSPADM

## 2024-07-05 RX ORDER — PROCHLORPERAZINE 25 MG
12.5 SUPPOSITORY, RECTAL RECTAL EVERY 12 HOURS PRN
Status: DISCONTINUED | OUTPATIENT
Start: 2024-07-05 | End: 2024-07-09 | Stop reason: HOSPADM

## 2024-07-05 RX ORDER — HYDROMORPHONE HYDROCHLORIDE 2 MG/1
2 TABLET ORAL EVERY 4 HOURS PRN
Status: DISCONTINUED | OUTPATIENT
Start: 2024-07-05 | End: 2024-07-09 | Stop reason: HOSPADM

## 2024-07-05 RX ORDER — NALOXONE HYDROCHLORIDE 0.4 MG/ML
0.4 INJECTION, SOLUTION INTRAMUSCULAR; INTRAVENOUS; SUBCUTANEOUS
Status: DISCONTINUED | OUTPATIENT
Start: 2024-07-05 | End: 2024-07-09 | Stop reason: HOSPADM

## 2024-07-05 RX ORDER — SODIUM CHLORIDE 9 MG/ML
INJECTION, SOLUTION INTRAVENOUS CONTINUOUS
Status: DISCONTINUED | OUTPATIENT
Start: 2024-07-05 | End: 2024-07-08

## 2024-07-05 RX ORDER — LABETALOL HYDROCHLORIDE 5 MG/ML
10 INJECTION, SOLUTION INTRAVENOUS
Status: DISCONTINUED | OUTPATIENT
Start: 2024-07-05 | End: 2024-07-09 | Stop reason: HOSPADM

## 2024-07-05 RX ORDER — IOPAMIDOL 755 MG/ML
68 INJECTION, SOLUTION INTRAVASCULAR ONCE
Status: DISCONTINUED | OUTPATIENT
Start: 2024-07-05 | End: 2024-07-05

## 2024-07-05 RX ORDER — PROCHLORPERAZINE MALEATE 5 MG
5 TABLET ORAL EVERY 6 HOURS PRN
Status: DISCONTINUED | OUTPATIENT
Start: 2024-07-05 | End: 2024-07-09 | Stop reason: HOSPADM

## 2024-07-05 RX ADMIN — TOPICAL ANESTHETIC 2.5 ML: 200 SPRAY DENTAL; PERIODONTAL at 15:18

## 2024-07-05 RX ADMIN — ONDANSETRON 4 MG: 2 INJECTION INTRAMUSCULAR; INTRAVENOUS at 07:18

## 2024-07-05 RX ADMIN — SODIUM CHLORIDE 60 ML: 9 INJECTION, SOLUTION INTRAVENOUS at 08:03

## 2024-07-05 RX ADMIN — INSULIN ASPART 1 UNITS: 100 INJECTION, SOLUTION INTRAVENOUS; SUBCUTANEOUS at 21:11

## 2024-07-05 RX ADMIN — SODIUM CHLORIDE 1000 ML: 9 INJECTION, SOLUTION INTRAVENOUS at 07:18

## 2024-07-05 RX ADMIN — SODIUM CHLORIDE: 9 INJECTION, SOLUTION INTRAVENOUS at 10:20

## 2024-07-05 RX ADMIN — ONDANSETRON 4 MG: 2 INJECTION INTRAMUSCULAR; INTRAVENOUS at 16:47

## 2024-07-05 RX ADMIN — PROCHLORPERAZINE EDISYLATE 5 MG: 5 INJECTION INTRAMUSCULAR; INTRAVENOUS at 17:22

## 2024-07-05 RX ADMIN — ALUMINUM HYDROXIDE, MAGNESIUM HYDROXIDE, AND SIMETHICONE 30 ML: 200; 200; 20 SUSPENSION ORAL at 08:36

## 2024-07-05 RX ADMIN — IOPAMIDOL 68 ML: 755 INJECTION, SOLUTION INTRAVENOUS at 08:03

## 2024-07-05 RX ADMIN — LIDOCAINE HYDROCHLORIDE 10 ML: 20 JELLY TOPICAL at 15:18

## 2024-07-05 RX ADMIN — SODIUM CHLORIDE: 9 INJECTION, SOLUTION INTRAVENOUS at 16:54

## 2024-07-05 RX ADMIN — SODIUM PHOSPHATE, MONOBASIC, MONOHYDRATE AND SODIUM PHOSPHATE, DIBASIC, ANHYDROUS 9 MMOL: 142; 276 INJECTION, SOLUTION INTRAVENOUS at 23:03

## 2024-07-05 RX ADMIN — HYDROMORPHONE HYDROCHLORIDE 0.5 MG: 1 INJECTION, SOLUTION INTRAMUSCULAR; INTRAVENOUS; SUBCUTANEOUS at 14:14

## 2024-07-05 RX ADMIN — ONDANSETRON 4 MG: 2 INJECTION INTRAMUSCULAR; INTRAVENOUS at 08:36

## 2024-07-05 ASSESSMENT — ACTIVITIES OF DAILY LIVING (ADL)
ADLS_ACUITY_SCORE: 38
ADLS_ACUITY_SCORE: 40
ADLS_ACUITY_SCORE: 38

## 2024-07-05 ASSESSMENT — COLUMBIA-SUICIDE SEVERITY RATING SCALE - C-SSRS
6. HAVE YOU EVER DONE ANYTHING, STARTED TO DO ANYTHING, OR PREPARED TO DO ANYTHING TO END YOUR LIFE?: NO
1. IN THE PAST MONTH, HAVE YOU WISHED YOU WERE DEAD OR WISHED YOU COULD GO TO SLEEP AND NOT WAKE UP?: NO
2. HAVE YOU ACTUALLY HAD ANY THOUGHTS OF KILLING YOURSELF IN THE PAST MONTH?: NO

## 2024-07-05 NOTE — ED NOTES
[facility]  ED Nurse Handoff Report    ED Chief complaint: Nausea & Vomiting      ED Diagnosis:   Final diagnoses:   None       Code Status: Full Code    Allergies: No Known Allergies    Patient Story: Martha comes to the  ER today for intermittent abdominal pain. CT shows an sbo, she's had one liter of NS and 2 doses of zofran.      Treatments and/or interventions provided: ivf/pain control  Patient's response to treatments and/or interventions: good    To be done/followed up on inpatient unit:  surgery consult    Does this patient have any cognitive concerns?:  none    Activity level - Baseline/Home:  Independent  Activity Level - Current:   Independent    Patient's Preferred language: English   Needed?: No    Isolation: None  Infection: Not Applicable  Patient tested for COVID 19 prior to admission: NO  Bariatric?: No    Vital Signs:   Vitals:    07/05/24 0655 07/05/24 0815 07/05/24 0816   BP: (!) 152/82 (!) 148/90    Pulse: (!) 137  91   Resp: 20 14    Temp: 97.4  F (36.3  C)     TempSrc: Temporal     SpO2: 96% 99%        Cardiac Rhythm:     Was the PSS-3 completed:   Yes  What interventions are required if any?               Family Comments: family at bedside  OBS brochure/video discussed/provided to patient/family: No              Name of person given brochure if not patient: na              Relationship to patient: na    For the majority of the shift this patient's behavior was Green.   Behavioral interventions performed were na.    ED NURSE PHONE NUMBER: [phone]

## 2024-07-05 NOTE — ED TRIAGE NOTES
Nausea and vomiting on and off for 6 days with pain across the abdomen.      Triage Assessment (Adult)       Row Name 07/05/24 0657          Triage Assessment    Airway WDL WDL        Respiratory WDL    Respiratory WDL WDL        Skin Circulation/Temperature WDL    Skin Circulation/Temperature WDL WDL        Cardiac WDL    Cardiac WDL X;rhythm     Pulse Rate & Regularity tachycardic         Peripheral/Neurovascular WDL    Peripheral Neurovascular WDL WDL        Cognitive/Neuro/Behavioral WDL    Cognitive/Neuro/Behavioral WDL X;mood/behavior     Mood/Behavior anxious        Mary Coma Scale    Best Eye Response 4-->(E4) spontaneous     Best Motor Response 6-->(M6) obeys commands     Best Verbal Response 5-->(V5) oriented     Hamer Coma Scale Score 15                     
Patient requests all Lab, Cardiology, and Radiology Results on their Discharge Instructions

## 2024-07-05 NOTE — CONSULTS
General Surgery Consultation    Martha Neal MRN#: [id number]   Age: 64 year old YOB: 1960     The surgical service was consulted by Kayce Holbrook PA-C to evaluate and/or treat this patient for SBO.    Date of Admission:          7/5/2024       Assessment:   64 year old female with PMH of T2DM, HLD, hysterectomy who presented on 7/5/2024 with abdominal pain, nausea, and vomiting x 5 days.  She reports initially the pain and nausea improved, but then worsened yesterday prompting her to come to the ED.  CT scan was consistent with SBO with transition point.         Plan:   -- Agree with admission  -- No emergent indication for operation.   -- Strict NPO.  IVF while NPO  -- Patient has significant gastric distention on CT scan.  Would recommend NG tube to LIS  -- Gastrograffin challenge tonight vs. Tomorrow AM   -- Frequent abdominal examinations    ___________________________________________________________________         Chief Complaint:     Chief Complaint   Patient presents with    Nausea & Vomiting          History of Present Illness:   64 year old female with PMHx T2DM, HLD, hysterectomy who presented today with abdominal pain, nausea, and vomiting intermittently x 5 days.  She reports she has never had something like this in the past.  She reports she did have a small formed bowel movement this morning and is currently feeling hungry although has not eaten much the past few days due to nausea.  She reports mild abdominal pain with palpation around her umbilicus. Patient is afebrile with lactate of 1 and WBC count of 9.9.  Electrolytes are WNL.           Past Medical History:     Past Medical History:   Diagnosis Date    Osteopenia     Type 2 diabetes mellitus (H)     White coat syndrome without hypertension           Past Surgical History:     Past Surgical History:   Procedure Laterality Date    APPENDECTOMY      incidental during hysterectomy    HYSTERECTOMY, PAP NO LONGER INDICATED       fibroids + ovarian cysts; TAHBSO            Medications:     Prior to Admission medications    Medication Sig Start Date End Date Taking? Authorizing Provider   aspirin (ASA) 81 MG EC tablet Take 1 tablet (81 mg) by mouth daily 1/13/22  Yes Amy Ruiz MD   atorvastatin (LIPITOR) 20 MG tablet Take 1 tablet (20 mg) by mouth daily 4/1/24  Yes Amy Ruiz MD   calcium carbonate-vitamin D (CALTRATE) 600-10 MG-MCG per tablet Take 2 tablets by mouth daily   Yes Unknown, Entered By History   cholecalciferol 25 MCG (1000 UT) TABS Take 1,000 Units by mouth daily   Yes Unknown, Entered By History   metFORMIN (GLUCOPHAGE XR) 500 MG 24 hr tablet Take 2 tablets (1,000 mg) by mouth daily (with dinner) 4/1/24  Yes Amy Ruiz MD   vitamin C (ASCORBIC ACID) 500 MG tablet Take 500 mg by mouth daily   Yes Unknown, Entered By History          Current Medications:         Current Facility-Administered Medications   Medication Dose Route Frequency Provider Last Rate Last Admin     Current Facility-Administered Medications   Medication Dose Route Frequency Provider Last Rate Last Admin    ondansetron (ZOFRAN) injection 4 mg  4 mg Intravenous Q30 Min PRN Cristiana Menard MD   4 mg at 07/05/24 0836            Allergies:   No Known Allergies       Social History:     Social History     Tobacco Use    Smoking status: Every Day     Current packs/day: 1.00     Average packs/day: 1 pack/day for 47.0 years (47.0 ttl pk-yrs)     Types: Cigarettes    Smokeless tobacco: Never    Tobacco comments:     47 years (as of 2024); 12 cigs/day (as of 2024); 1ppd at most   Substance Use Topics    Alcohol use: No          Family History:     Family History   Problem Relation Age of Onset    Coronary Artery Disease Father         MI and CABG in his 60s    Cerebrovascular Disease Father         60s/70s    Hyperlipidemia Father     Breast Cancer Father     Diabetes No family hx of     Coronary Artery Disease Early Onset No family hx of      Ovarian Cancer No family hx of     Colon Cancer No family hx of              Review of Systems:   The 12 point Review of Systems is negative other than noted in the HPI.         Physical Exam:   Blood pressure (!) 148/90, pulse 91, temperature 97.4  F (36.3  C), temperature source Temporal, resp. rate 14, SpO2 99%, not currently breastfeeding.  No intake/output data recorded.  General: female of stated age, no acute distress  HEENT: Normocephalic.   Neck: Supple  Lungs: Non-labored breathing on room air  Heart: Regular rate & rhythm   Abdomen: Soft, non-distended, mild tenderness to palpation in periumbilical region  Extremities: Moves all extremities. No edema  Neurologic: No focal deficits   Skin: Warm and dry         Data:   Labs:  Recent Labs   Lab Test 07/05/24  0715 04/01/24  1128 03/15/23  1212   WBC 9.9 6.6 7.1   HGB 15.6 14.2 14.3   HCT 45.8 44.5 44.5    206 217     Recent Labs   Lab Test 07/05/24  0715 04/01/24  1128 03/15/23  1212   POTASSIUM 3.9 4.1 4.1   CHLORIDE 100 105 106   CO2 29 27 24   BUN 21.1 16.1 15.5   CR 0.81 0.73 0.79   * 132* 127*     Recent Labs   Lab Test 07/05/24  0715 04/01/24  1128 03/15/23  1212   BILITOTAL 0.8 0.5 0.5   ALT 22 30 15   AST 18 26 18   ALKPHOS 89 92 85   LIPASE 58  --   --      Recent Labs   Lab Test 07/05/24  1020   INR 1.05   PTT 28       CT scan of the abdomen:   Multiple dilated loops of small bowel throughout abdomen with transition point in central abdomen compatible with small bowel obstruction.   Mild wall thickening along sigmoid and descending colon with small volume free fluid suggestive of colitis      I have discussed the history, physical, and plan with Dr. Herbert, who has independently interviewed and examined the patient and agrees with the plan as stated.     Janki Jackson DO on 7/5/2024 at 11:15 AM  General Surgery Resident PGY4

## 2024-07-05 NOTE — H&P
History and Physical - Hospitalist Service       Date of Admission:  7/5/2024    Assessment & Plan      Martha Neal is a 64 year old female who has a past medical history of DM II, paroxysmal supraventricular tachycardia, dyslipidemia, tobacco abuse and family history of BRCA gene mutation, among others, who was admitted on 7/5/2024 for further evaluation of abdominal pain with associated nausea and vomiting.     Patient presented to the ED hemodynamically stable, although hypertensive (/90). She was afebrile. Reports she initially developed diffuse abdominal pain 06/30 with associated N/V, chills, intermittent dizziness and low grade fever, which progressively worsened in severity until 07/01 morning when she had one episode of bilious vomiting that seemed to improve her symptoms briefly until returning 07/02. She describes her pain as a 10/10, cramping pain that is most severe surrounding her umbilicus, left upper and lower quadrants. Last bowel movement small and soft this morning. No recent history of melena, RBPR, bowel obstruction or colitis. Cologuard positive 03/2023. She declined follow-up colonoscopy. Current every day smoker (~39 pack-year history). She reports surgical history of total hysterectomy and appendectomy. No additional abdominal surgeries. Family history of breast cancer (father). Sister received genetic testing following his diagnosis and is BRCA positive. Patient reports she declined genetic testing. Labs notable for no leukocytosis or significant electrolyte abnormalities. No decrease in PO intake, high grade fevers, chest pain, SOB, Lipase 58. UA pending. CRP pending. CT abdomen and pelvis demonstrated multiple dilated loops of small bowel seen throughout the abdomen with transition point seen along the central abdomen. Mild wall thickening seen along the sigmoid and descending colon with small volume free fluid noted along the left  paracolic gutter. Findings are suggestive of colitis. Small volume free fluid is seen in the pelvis. Patient given 1L NS bolus and zofran with minimal relief of her symptoms.     Small bowel obstruction   Abdominal pain, nausea and vomiting secondary to above   Suspected colitis on CT   *CT scan was consistent with SBO with transition point. There was mild wall thickening seen along the sigmoid and descending colon with small volume free fluid noted along the left paracolic gutter/pelvis.   *Surgical history notable for appendectomy and hysterectomy.   *Cologuard positive 03/2023. No subsequent colonoscopy since positive cologuard. Fecal colorectal cancer screens negative 06/2017, 08/2018, 12/2019. Denies recent history of melena or RBPR. Endorses 15 lb weight loss following starting metformin in 2023. Family history of breast cancer (father). Sister completed genetic testing and BRCA gene positive. Patient every day smoker (~39 pack-year history).   - Admit to inpatient.   - General surgery consult requested, appreciate the cares. NG tube placed with LIS. Suspect gastrografin challenge AM, per general surgery.   - Strict NPO; hold PTA medications.  - IV fluids at 125 ml/hr.    - Pain management:               --PRN APAP 650 mg every 4 hours.                 --PRN PO Dilaudid 1-2 mg every 4 hours based off pain severity.                 --PRN IV dilaudid 0.2-0.4 mg every 2 hours based off severity.     --Ice packs applied to painful area as needed.     --Hot pads available PRN or Aqua-K pad ordered with nursing care ordered for utilization to painful area.     - Continue to monitor with morning labs.     Type 2 diabetes mellitus without complication, without long-term current use of insulin   Recent Labs   Lab Test 04/01/24  1128   A1C 7.1*    PTA regimen includes:  - Hold PTA metformin. Resume at discharge.    - Medium insulin sliding scale ordered.  - Glucose checks QID.    - Hypoglycemic protocol in place.       Hypophosphatemia   *Phos 2.1 07/05.   - Start standard phosphorus replacement therapy per protocol.     Family history of BRCA   *Sister received genetic testing following father's breast cancer diagnosis and subsequent mastectomy. Sister BRCA gene positive. Patient did not receive individual genetic testing.   - Follow-up with PCP outpatient for continued yearly mammograms (recommend MRI) and appropriate cancer screening.     Tobacco Abuse  History of suspected undiagnosed COPD   *Current every day smoker >15 cigarettes since 13 years old (~39 pack-year history).   - Tobacco cessation advised.   - Nicotine patch available PRN.     Paroxysmal supraventricular tachycardia   Hyperlipidemia   *Zio Patch 2018 demonstrated less than 1% of the time with longest episode 15 beats. No treatment indicated at time given infrequent symptoms.   - Telemetry.   - Hold PTA statin and ASA 81 mg.   - Continue to monitor for new or worsening symptoms.      Vitamin D deficiency   *Vitamin D 62 04/2024.   - Continue to monitor outpatient.     White coat syndrome without hypertension   - Hydralazine and labetalol PRN for SBP >180.   - Continue to monitor.         Diet: NPO per Anesthesia Guidelines for Procedure/Surgery Except for: No Exceptions  DVT Prophylaxis: Pneumatic Compression Devices  La Catheter: Not present  Lines: None     Cardiac Monitoring: ACTIVE order. Indication: Hx of pSVT  Code Status: No CPR- Do NOT Intubate    Disposition Plan     Medically Ready for Discharge: Anticipated in 2-4 Days     The patient's care was discussed with the Attending Physician, Dr. Juliette Parra .    Kayce Holbrook PA-C  Hospitalist Service  [facility]  Securely message with disco volante (more info)  Text page via [facility] Paging/Directory   ______________________________________________________________________    Chief Complaint   Abdominal pain, nausea and vomiting     History is obtained from the  patient    History of Present Illness   Martha Neal is a 64 year old female who has a past medical history of DM II, paroxysmal supraventricular tachycardia, dyslipidemia, tobacco abuse and family history of BRCA gene mutation, among others, who was admitted on 7/5/2024 for further evaluation of abdominal pain with associated nausea and vomiting.     Patient presented to the ED hemodynamically stable, although hypertensive (/90). She was afebrile. Reports she initially developed diffuse abdominal pain 06/30 with associated N/V, chills, intermittent dizziness and low grade fever, which progressively worsened in severity until 07/01 morning when she had one episode of bilious vomiting that seemed to improve her symptoms briefly until returning 07/02. She describes her pain as a 10/10, cramping pain that is most severe surrounding her umbilicus, left upper and lower quadrants. Last bowel movement small and soft this morning. No recent history of melena, RBPR, bowel obstruction or colitis. Cologuard positive 03/2023. She declined follow-up colonoscopy. Current every day smoker (~39 pack-year history). She reports surgical history of total hysterectomy and appendectomy. No additional abdominal surgeries. Family history of breast cancer (father). Sister received genetic testing following his diagnosis and is BRCA positive. Patient reports she declined genetic testing. Labs notable for no leukocytosis or significant electrolyte abnormalities. No decrease in PO intake, high grade fevers, chest pain, SOB, Lipase 58. UA pending. CRP pending. CT abdomen and pelvis demonstrated multiple dilated loops of small bowel seen throughout the abdomen with transition point seen along the central abdomen. Mild wall thickening seen along the sigmoid and descending colon with small volume free fluid noted along the left paracolic gutter. Findings are suggestive of colitis. Small volume free fluid is seen in the pelvis. Patient  given 1L NS bolus and zofran with minimal relief of her symptoms.       Past Medical History    Past Medical History:   Diagnosis Date    Osteopenia     Type 2 diabetes mellitus (H)     White coat syndrome without hypertension        Past Surgical History   Past Surgical History:   Procedure Laterality Date    APPENDECTOMY      incidental during hysterectomy    HYSTERECTOMY, PAP NO LONGER INDICATED      fibroids + ovarian cysts; TAHBSO     Prior to Admission Medications   Prior to Admission Medications   Prescriptions Last Dose Informant Patient Reported? Taking?   aspirin (ASA) 81 MG EC tablet 7/4/2024  No Yes   Sig: Take 1 tablet (81 mg) by mouth daily   atorvastatin (LIPITOR) 20 MG tablet 7/4/2024  No Yes   Sig: Take 1 tablet (20 mg) by mouth daily   calcium carbonate-vitamin D (CALTRATE) 600-10 MG-MCG per tablet 7/4/2024  Yes Yes   Sig: Take 2 tablets by mouth daily   cholecalciferol 25 MCG (1000 UT) TABS 7/4/2024 Self Yes Yes   Sig: Take 1,000 Units by mouth daily   metFORMIN (GLUCOPHAGE XR) 500 MG 24 hr tablet 7/4/2024  No Yes   Sig: Take 2 tablets (1,000 mg) by mouth daily (with dinner)   vitamin C (ASCORBIC ACID) 500 MG tablet 7/4/2024 Self Yes Yes   Sig: Take 500 mg by mouth daily      Facility-Administered Medications: None      Allergies   No Known Allergies     Physical Exam   Vital Signs: Temp: 97.2  F (36.2  C) Temp src: Oral BP: (!) 161/85 Pulse: 89   Resp: 18 SpO2: 95 % O2 Device: None (Room air)    Weight: 0 lbs 0 oz    GENERAL:  Pleasant, cooperative, alert. Sitting in bed comfortably with sister at bedside.   HEENT: Normocephalic, atraumatic.  Extra occular mm intact.  Sclera clear. PERRL.  Mucous membranes moist.  No erythema; uvula midline.  Neck supple with no adenopathy.   PULMONOLOGY: Clear to auscultation bilaterally.  CARDIAC: Regular rate and rhythm.  No appreciated murmur.    ABDOMEN: Soft, non distended. Mild tenderness to diffuse palpation most insignificant near umbilicus and left  upper and lower quadrants. No peritoneal signs.   MUSCULOSKELETAL:  Moving x 4 spontaneously with CMS intact x4.  Normal bulk and tone.  No LE edema.  Radial pulses 2+ bilaterally.    NEURO: Alert and oriented x3. Nonfocal exam.    Medical Decision Making       64 MINUTES SPENT BY ME on the date of service doing chart review, history, exam, documentation & further activities per the note.      Data   ------------------------- PAST 24 HR DATA REVIEWED -----------------------------------------------    I have personally reviewed the following data over the past 24 hrs:    9.9  \   15.6   / 218     139 100 21.1 /  200 (H)   3.9 29 0.81 \     ALT: 22 AST: 18 AP: 89 TBILI: 0.8   ALB: 4.3 TOT PROTEIN: 7.2 LIPASE: 58     Procal: N/A CRP: 6.57 (H) Lactic Acid: 1.0       INR:  1.05 PTT:  28   D-dimer:  N/A Fibrinogen:  N/A       Imaging results reviewed over the past 24 hrs:   Recent Results (from the past 24 hour(s))   CT Abdomen Pelvis w Contrast    Narrative    CT ABDOMEN PELVIS W CONTRAST 7/5/2024 8:19 AM    CLINICAL HISTORY: Nausea vomiting and abdominal pain for 5 days.   History of appendectomy and hysterectomy    TECHNIQUE: CT scan of the abdomen and pelvis was performed following  injection of IV contrast. Multiplanar reformats were obtained. Dose  reduction techniques were used.  CONTRAST: 68mL [id number]-370    COMPARISON: None.    FINDINGS:   LOWER CHEST: Unremarkable.    HEPATOBILIARY: Small cyst is noted along the medial left hepatic lobe.  The gallbladder appears contracted.    PANCREAS: No significant mass, duct dilatation, or inflammatory  change.    SPLEEN: Normal size.    ADRENAL GLANDS: Mild thickening is seen along the adrenal glands  suggestive of hyperplasia.    KIDNEYS/BLADDER: No significant mass, stones, or hydronephrosis.    BOWEL: Multiple dilated loops of small bowel are seen throughout the  abdomen measuring up to 4.0 cm in diameter along the left upper  quadrant (series 3, image 33). Transition  point is seen in the central  abdomen (series 5, image 21). Mild wall thickening is noted along the  sigmoid and descending colon which appear contracted. There is also  small volume ascites is noted along the left paracolic gutter.    PELVIC ORGANS: Uterus is surgically absent. Small volume ascites seen  in the pelvis.    ADDITIONAL FINDINGS: Scattered vascular calcifications are seen in the  abdominal aorta and iliac branches.    MUSCULOSKELETAL: Multilevel degenerative changes are seen in the  spine.      Impression    IMPRESSION:   1.  Multiple dilated loops of small bowel seen throughout the abdomen  with transition point seen along the central abdomen. Findings are  compatible with small bowel obstruction.  2.  Mild wall thickening seen along the sigmoid and descending colon  with small volume free fluid noted along the left paracolic gutter.  Findings are suggestive of colitis.  3.  Small volume free fluid is seen in the pelvis.    KWABENA ARANGO MD         SYSTEM ID:  [id number]   XR Abdomen Port 1 View    Narrative    EXAM: XR ABDOMEN PORT 1 VIEW  LOCATION: [facility]  DATE: 7/5/2024    INDICATION: Verify NG tube placement  COMPARISON: CT 7/5/2024 at 0807 hours      Impression    IMPRESSION: An NG tube sidehole is in the distal esophagus. Recommend advancing at least 6 cm. No distended air-filled loops of small bowel.

## 2024-07-05 NOTE — PHARMACY-ADMISSION MEDICATION HISTORY
Pharmacist Admission Medication History    Admission medication history is complete. The information provided in this note is only as accurate as the sources available at the time of the update.    Information Source(s): Patient and CareEverywhere/SureScripts via in-person    Changes made to PTA medication list:  Added: None  Deleted: None  Changed: None    Allergies reviewed with patient and updates made in EHR: no    Medication History Completed By: Bisi Cooper [facility] 7/5/2024 9:37 AM    PTA Med List   Medication Sig Last Dose    aspirin (ASA) 81 MG EC tablet Take 1 tablet (81 mg) by mouth daily 7/4/2024    atorvastatin (LIPITOR) 20 MG tablet Take 1 tablet (20 mg) by mouth daily 7/4/2024    calcium carbonate-vitamin D (CALTRATE) 600-10 MG-MCG per tablet Take 2 tablets by mouth daily 7/4/2024    cholecalciferol 25 MCG (1000 UT) TABS Take 1,000 Units by mouth daily 7/4/2024    metFORMIN (GLUCOPHAGE XR) 500 MG 24 hr tablet Take 2 tablets (1,000 mg) by mouth daily (with dinner) 7/4/2024    vitamin C (ASCORBIC ACID) 500 MG tablet Take 500 mg by mouth daily 7/4/2024

## 2024-07-05 NOTE — ED PROVIDER NOTES
Emergency Department Note      History of Present Illness     Chief Complaint   Nausea & Vomiting    HPI   Martha Neal is a 64 year old female with a history of Type 2 Diabetes, Osteopenia, who presents to the ED with her sister Radha for an evaluation of nausea and vomiting. The patient reports on and off symptoms for the past 6 days since Sunday morning. She recalls feeling dizzy with chills, fevers, body ached, and abdominal pain all afternoon. The patient describes her pain as a crampy pain. At around 1 am Tuesday morning, the patient continued to have episodes of vomit. Wednesday she continued to feel a little better up until later at about 8:30 pm that evening, she started having worse episodes of pain. She reports eating properly with no bother. Denies any bowel obstruction. No fevers since Sunday and no travel in the past couple of weeks.     Independent Historian   patient as detailed above.    Review of External Notes   I reviewed her office note with her doctor from 4/1/2024  Past Medical History     Medical History and Problem List   Osteopenia   Type 2 Diabetes   White coat syndrome without hypertension     Medications   Aspirin 81 mg   Atorvastatin   Cholecalciferol   Metformin     Surgical History   Appendectomy   Hysterectomy    Physical Exam     Patient Vitals for the past 24 hrs:   BP Temp Temp src Pulse Resp SpO2   07/05/24 0816 -- -- -- 91 -- --   07/05/24 0815 (!) 148/90 -- -- -- 14 99 %   07/05/24 0655 (!) 152/82 97.4  F (36.3  C) Temporal (!) 137 20 96 %     Physical Exam  Nursing note and vitals reviewed.    Constitutional:  Appears somewhat uncomfortable.  HENT:                Nose normal.  No discharge.      Oral mucosa is dry.  Eyes:    Conjunctivae are normal without injection.  Pupils are equal.  Cardiovascular:  Tachycardic, regular rhythm with normal S1 and S2.      Normal heart sounds and peripheral pulses 2+ and equal.    Pulmonary:  Effort normal and breath sounds clear to  auscultation bilaterally.    No respiratory distress.    GI:    Mild epigastric tenderness. Mild diffuse lower abdominal tenderness.       No flank pain.    Musculoskeletal:  Normal range of motion. No extremity deformity.     No edema and no tenderness.    Neurological:   Alert and oriented. No focal weakness. Mouth is dry.   Skin:    Skin is warm and dry. No rash noted.   Psychiatric:   Behavior is normal. Appropriate mood and affect.     Judgment and thought content normal.      Diagnostics     Lab Results   Labs Ordered and Resulted from Time of ED Arrival to Time of ED Departure   COMPREHENSIVE METABOLIC PANEL - Abnormal       Result Value    Sodium 139      Potassium 3.9      Carbon Dioxide (CO2) 29      Anion Gap 10      Urea Nitrogen 21.1      Creatinine 0.81      GFR Estimate 80      Calcium 10.1      Chloride 100      Glucose 177 (*)     Alkaline Phosphatase 89      AST 18      ALT 22      Protein Total 7.2      Albumin 4.3      Bilirubin Total 0.8     CRP INFLAMMATION - Abnormal    CRP Inflammation 6.57 (*)    LIPASE - Normal    Lipase 58     CBC WITH PLATELETS AND DIFFERENTIAL    WBC Count 9.9      RBC Count 4.92      Hemoglobin 15.6      Hematocrit 45.8      MCV 93      MCH 31.7      MCHC 34.1      RDW 13.7      Platelet Count 218      % Neutrophils 76      % Lymphocytes 14      % Monocytes 9      % Eosinophils 0      % Basophils 0      % Immature Granulocytes 0      NRBCs per 100 WBC 0      Absolute Neutrophils 7.5      Absolute Lymphocytes 1.4      Absolute Monocytes 0.9      Absolute Eosinophils 0.0      Absolute Basophils 0.0      Absolute Immature Granulocytes 0.0      Absolute NRBCs 0.0     ROUTINE UA WITH MICROSCOPIC REFLEX TO CULTURE   LACTIC ACID WHOLE BLOOD   INR   PARTIAL THROMBOPLASTIN TIME   MAGNESIUM   PHOSPHORUS   BLOOD CULTURE   BLOOD CULTURE   ABO/RH TYPE AND SCREEN       Imaging   CT Abdomen Pelvis w Contrast   Final Result   IMPRESSION:    1.  Multiple dilated loops of small bowel  seen throughout the abdomen   with transition point seen along the central abdomen. Findings are   compatible with small bowel obstruction.   2.  Mild wall thickening seen along the sigmoid and descending colon   with small volume free fluid noted along the left paracolic gutter.   Findings are suggestive of colitis.   3.  Small volume free fluid is seen in the pelvis.      KWABENA ARANGO MD            SYSTEM ID:  [id number]        EKG     Independent Interpretation   None    ED Course      Medications Administered   Medications   ondansetron (ZOFRAN) injection 4 mg (4 mg Intravenous $Given 7/5/24 0836)   sodium chloride 0.9 % infusion (has no administration in time range)   sodium chloride 0.9% BOLUS 1,000 mL (0 mLs Intravenous Stopped 7/5/24 0801)   Saline (60 mLs As instructed $Given 7/5/24 0803)   iopamidol (ISOVUE-370) solution 68 mL (68 mLs Intravenous $Given 7/5/24 0803)   alum & mag hydroxide-simethicone (MAALOX) suspension 30 mL (30 mLs Oral $Given 7/5/24 0836)       Procedures   Procedures     Discussion of Management   Admitting Hospitalist, Dr Parra    ED Course   ED Course as of 07/05/24 0953   Fri Jul 05, 2024   0706 I obtained history and examined the patient as noted above.    0830 I rechecked and updated the patient. She is feeling a little better.    0830 GFR Estimate: 80   0859 I rechecked and updated the patient. She is having a small bowel obstruction.    0907 I spoke to Dr. Herbert regarding the patient.    0924 I spoke with the JARED Holbrook who is working for Dr. Parra of the hospitalist service regarding admission of the patient.        Optional/Additional Documentation  None    Medical Decision Making / Diagnosis     CMS Diagnoses: None    MIPS       None    MDM   Martha Neal is a 64 year old female who presents with abdominal pain and intermittent vomiting for the past 5 to 6 days.  Zofran fluids and labs were obtained.  Her CRP is up a little bit at 6.57 but comprehensive panel is  normal, lipase normal CBC shows normal white count 9.9.  CT of the abdomen shows evidence may be of some colitis but there is a small bowel obstruction with a mid transition point.  I talked with Dr. Herbert from surgery who will be seeing the patient and will hold on a NG tube at this time but he will determine that if it is necessary.  I have talked with Meme from the hospitalist service and Dr. Parra will be the hospitalist who will be admitting the patient.  Patient did not want anything for pain at this time.  Before I got the results back of her test, she felt like she had some heartburn so I gave her some Maalox and it did help a little bit.  She did not vomit it up.    Disposition   The patient was admitted to the hospital.     Diagnosis     ICD-10-CM    1. Small bowel obstruction (H)  K56.609       2. Colitis  K52.9            Discharge Medications   New Prescriptions    No medications on file       Scribe Disclosure:  I, Zenobia Bowman, am serving as a scribe at 7:10 AM on 7/5/2024 to document services personally performed by Cristiana Menard MD based on my observations and the provider's statements to me.          Cristiana Menard MD  07/05/24 6570

## 2024-07-06 ENCOUNTER — APPOINTMENT (OUTPATIENT)
Dept: GENERAL RADIOLOGY | Facility: CLINIC | Age: 64
DRG: 389 | End: 2024-07-06
Attending: INTERNAL MEDICINE
Payer: COMMERCIAL

## 2024-07-06 LAB
ALBUMIN SERPL BCG-MCNC: 3.5 G/DL (ref 3.5–5.2)
ALP SERPL-CCNC: 68 U/L (ref 40–150)
ALT SERPL W P-5'-P-CCNC: 14 U/L (ref 0–50)
ANION GAP SERPL CALCULATED.3IONS-SCNC: 8 MMOL/L (ref 7–15)
AST SERPL W P-5'-P-CCNC: 15 U/L (ref 0–45)
BILIRUB SERPL-MCNC: 0.8 MG/DL
BUN SERPL-MCNC: 14.5 MG/DL (ref 8–23)
CALCIUM SERPL-MCNC: 8.8 MG/DL (ref 8.8–10.2)
CHLORIDE SERPL-SCNC: 109 MMOL/L (ref 98–107)
CREAT SERPL-MCNC: 0.83 MG/DL (ref 0.51–0.95)
DEPRECATED HCO3 PLAS-SCNC: 28 MMOL/L (ref 22–29)
EGFRCR SERPLBLD CKD-EPI 2021: 77 ML/MIN/1.73M2
ERYTHROCYTE [DISTWIDTH] IN BLOOD BY AUTOMATED COUNT: 13.9 % (ref 10–15)
GLUCOSE BLDC GLUCOMTR-MCNC: 107 MG/DL (ref 70–99)
GLUCOSE BLDC GLUCOMTR-MCNC: 121 MG/DL (ref 70–99)
GLUCOSE BLDC GLUCOMTR-MCNC: 130 MG/DL (ref 70–99)
GLUCOSE BLDC GLUCOMTR-MCNC: 133 MG/DL (ref 70–99)
GLUCOSE BLDC GLUCOMTR-MCNC: 138 MG/DL (ref 70–99)
GLUCOSE BLDC GLUCOMTR-MCNC: 144 MG/DL (ref 70–99)
GLUCOSE SERPL-MCNC: 144 MG/DL (ref 70–99)
HCT VFR BLD AUTO: 37.9 % (ref 35–47)
HGB BLD-MCNC: 12.2 G/DL (ref 11.7–15.7)
MAGNESIUM SERPL-MCNC: 2 MG/DL (ref 1.7–2.3)
MCH RBC QN AUTO: 31.1 PG (ref 26.5–33)
MCHC RBC AUTO-ENTMCNC: 32.2 G/DL (ref 31.5–36.5)
MCV RBC AUTO: 97 FL (ref 78–100)
PHOSPHATE SERPL-MCNC: 2.3 MG/DL (ref 2.5–4.5)
PLATELET # BLD AUTO: 147 10E3/UL (ref 150–450)
POTASSIUM SERPL-SCNC: 3.5 MMOL/L (ref 3.4–5.3)
PROT SERPL-MCNC: 5.7 G/DL (ref 6.4–8.3)
RBC # BLD AUTO: 3.92 10E6/UL (ref 3.8–5.2)
SODIUM SERPL-SCNC: 145 MMOL/L (ref 135–145)
WBC # BLD AUTO: 9.4 10E3/UL (ref 4–11)

## 2024-07-06 PROCEDURE — 250N000011 HC RX IP 250 OP 636: Performed by: INTERNAL MEDICINE

## 2024-07-06 PROCEDURE — 36415 COLL VENOUS BLD VENIPUNCTURE: CPT

## 2024-07-06 PROCEDURE — 84100 ASSAY OF PHOSPHORUS: CPT

## 2024-07-06 PROCEDURE — 85027 COMPLETE CBC AUTOMATED: CPT

## 2024-07-06 PROCEDURE — 258N000003 HC RX IP 258 OP 636

## 2024-07-06 PROCEDURE — 99232 SBSQ HOSP IP/OBS MODERATE 35: CPT | Performed by: INTERNAL MEDICINE

## 2024-07-06 PROCEDURE — 120N000001 HC R&B MED SURG/OB

## 2024-07-06 PROCEDURE — 74018 RADEX ABDOMEN 1 VIEW: CPT

## 2024-07-06 PROCEDURE — 80053 COMPREHEN METABOLIC PANEL: CPT

## 2024-07-06 PROCEDURE — 250N000009 HC RX 250: Performed by: INTERNAL MEDICINE

## 2024-07-06 PROCEDURE — 83735 ASSAY OF MAGNESIUM: CPT

## 2024-07-06 PROCEDURE — 258N000003 HC RX IP 258 OP 636: Performed by: INTERNAL MEDICINE

## 2024-07-06 RX ADMIN — SODIUM CHLORIDE: 9 INJECTION, SOLUTION INTRAVENOUS at 04:38

## 2024-07-06 RX ADMIN — INSULIN ASPART 1 UNITS: 100 INJECTION, SOLUTION INTRAVENOUS; SUBCUTANEOUS at 13:15

## 2024-07-06 RX ADMIN — SODIUM PHOSPHATE, MONOBASIC, MONOHYDRATE AND SODIUM PHOSPHATE, DIBASIC, ANHYDROUS 9 MMOL: 142; 276 INJECTION, SOLUTION INTRAVENOUS at 15:18

## 2024-07-06 RX ADMIN — SODIUM CHLORIDE: 9 INJECTION, SOLUTION INTRAVENOUS at 13:14

## 2024-07-06 RX ADMIN — PANTOPRAZOLE SODIUM 40 MG: 40 INJECTION, POWDER, FOR SOLUTION INTRAVENOUS at 11:51

## 2024-07-06 RX ADMIN — SODIUM CHLORIDE: 9 INJECTION, SOLUTION INTRAVENOUS at 20:23

## 2024-07-06 ASSESSMENT — ACTIVITIES OF DAILY LIVING (ADL)
ADLS_ACUITY_SCORE: 25
ADLS_ACUITY_SCORE: 42
ADLS_ACUITY_SCORE: 42
ADLS_ACUITY_SCORE: 25
ADLS_ACUITY_SCORE: 42
ADLS_ACUITY_SCORE: 40
ADLS_ACUITY_SCORE: 42
ADLS_ACUITY_SCORE: 40
ADLS_ACUITY_SCORE: 40
ADLS_ACUITY_SCORE: 42
ADLS_ACUITY_SCORE: 25
ADLS_ACUITY_SCORE: 42
ADLS_ACUITY_SCORE: 25
ADLS_ACUITY_SCORE: 42
ADLS_ACUITY_SCORE: 40
ADLS_ACUITY_SCORE: 25
ADLS_ACUITY_SCORE: 42

## 2024-07-06 NOTE — PROGRESS NOTES
[facility]  GENERAL SURGERY Progress Note    Admission Date: 2024         Assessment and Plan:     Martha Neal is a 64 year old female with PMH of T2DM, HLD, hysterectomy who presented on 2024 with abdominal pain, nausea, and vomiting x 5 days.  CT scan was consistent with SBO with transition point. NG tube placed overnight for nausea, but some difficulty with advancement into the stomach.  Approximately 450 mL of dark output in canister this morning.  Patient denies passing flatus or having a BM today   - Continue NPO, IVF  - Continue NG tube to LIS  - Pain control- tylenol, dilaudid PRN  - Awaiting AM lab results - CBC and CMP  - Possible gastrografin challenge tomorrow if patient appropriately decompressed    Patient discussed with Dr. Butterfield, staff surgeon.    Janki Jackson,   General Surgery, PGY-4  Pager: [phone]             Interval History:     Pain controlled, no flatus or BM, UO adequate  Meds reviewed.                      Physical Exam:   Blood pressure 134/70, pulse 66, temperature 98.5  F (36.9  C), temperature source Oral, resp. rate 18, weight 61.3 kg (135 lb 2.3 oz), SpO2 94%, not currently breastfeeding.  Temperature Temp  Av.1  F (36.7  C)  Min: 97.2  F (36.2  C)  Max: 98.5  F (36.9  C)   No intake/output data recorded.  Constitutional:  Awake and in no apparent distress.   Lungs: Breathing comfortably on room air   Cardiovascular: Extremities warm and well perfused   Abdomen: Soft, non-distended, mild tenderness in periumbilical area   Extremities: No edema. Moves all 4 extremities          Data:     Recent Labs   Lab Test 24  0715 24  1128 03/15/23  1212   WBC 9.9 6.6 7.1   HGB 15.6 14.2 14.3   HCT 45.8 44.5 44.5    206 217      Recent Labs   Lab Test 24  0715 24  1128 03/15/23  1212    143 142   POTASSIUM 3.9 4.1 4.1   CHLORIDE 100 105 106   CO2 29 27 24   BUN 21.1 16.1 15.5   CR 0.81 0.73 0.79     Recent Labs    Lab Test 07/05/24  0715 04/01/24  1128 03/15/23  1212   BILITOTAL 0.8 0.5 0.5   ALT 22 30 15   AST 18 26 18   ALKPHOS 89 92 85   LIPASE 58  --   --      Recent Labs   Lab Test 07/05/24  1020   INR 1.05   PTT 28

## 2024-07-06 NOTE — PROGRESS NOTES
[facility]    Hospitalist Progress Note    Brief Summary:  Martha Neal is a 64 year old female who has a past medical history of DM II, paroxysmal supraventricular tachycardia, dyslipidemia, tobacco abuse and family history of BRCA gene mutation, among others, who was admitted on 7/5/2024 for further evaluation of abdominal pain with associated nausea and vomiting and found to have small bowel obstruction.     Assessment & Plan        Small bowel obstruction   Abdominal pain, nausea and vomiting secondary to above   Suspected colitis on CT     This is a 64-year-old female with history of diabetes mellitus type 2, hyperlipidemia, history of hysterectomy appendectomy in the past, with nausea vomiting abdominal pain found to small bowel obstruction on CT scan with transition point.  At this point her abdomen is soft and benign  She is status post NG tube placement with about 450 mL output after advancement of NG tube.  Continue IV fluids, keep NPO.  Intake and output  charting Daily weights.  I will start her on IV Protonix 40 mg daily  And nausea vomiting abdominal pain is now improved, passing some gas as well.  General surgery is consulted, considering Gastrografin challenge either today or tomorrow.  Continue with the nonoperative conservative management at this time.  Most likely will improve.  Given antiemetic protocol, pain medication as needed.         Type 2 diabetes mellitus       Recent Labs   Lab Test 04/01/24  1128   A1C 7.1*    PTA regimen includes:  - Hold PTA metformin. Resume at discharge.    - Medium insulin sliding scale ordered.  - Glucose checks QID.    - Hypoglycemic protocol in place.    Blood sugar reasonably controlled at this time.  Agree with holding metformin at this time     Hypophosphatemia   *Phos 2.1 07/05.   - Start standard phosphorus replacement therapy per protocol.      Tobacco Abuse  History of suspected undiagnosed COPD   *Current every day smoker >15  cigarettes since 13 years old (~39 pack-year history).   - Tobacco cessation advised.   - Nicotine patch available PRN.      Paroxysmal supraventricular tachycardia   Hyperlipidemia   *Zio Patch 2018 demonstrated less than 1% of the time with longest episode 15 beats. No treatment indicated at time given infrequent symptoms.   - Telemetry.   - Hold PTA statin and ASA 81 mg.  As n.p.o., can resume it from tomorrow       Vitamin D deficiency   *Vitamin D 62 04/2024.   - Continue to monitor outpatient.      History of White coat syndrome without hypertension   - Hydralazine and labetalol PRN for SBP >180.   - Continue to monitor.   Blood pressure well-controlled at this time       DVT Prophylaxis: Pneumatic Compression Devices  Code Status: No CPR- Do NOT Intubate        Medically Ready for Discharge: Anticipated in 2-4 Days        Willis Bejarano MD, MD  Text Page  (7am - 6pm)    Interval History   Patient seen and evaluated this morning, feeling much better, after advancing her NG tube, she did have some NG output.  She told me she is feeling better as compared to before, no nausea vomiting no abdominal pain, passing some gas.  But no BM yet.    No other significant event overnight    -Data reviewed today: I reviewed all new labs and imaging results over the last 24 hours. I personally reviewed no images or EKG's today.    Physical Exam   Temp: 98.5  F (36.9  C) Temp src: Oral BP: 134/70 Pulse: 66   Resp: 18 SpO2: 94 % O2 Device: None (Room air)    Vitals:    07/05/24 2100 07/06/24 0437   Weight: 58.8 kg (129 lb 9.6 oz) 61.3 kg (135 lb 2.3 oz)     Vital Signs with Ranges  Temp:  [97.2  F (36.2  C)-98.5  F (36.9  C)] 98.5  F (36.9  C)  Pulse:  [] 66  Resp:  [14-18] 18  BP: (133-161)/(70-91) 134/70  SpO2:  [94 %-97 %] 94 %  No intake/output data recorded.    Constitutional: awake, alert, cooperative, no apparent distress, and appears stated age  Eyes: Lids and lashes normal, pupils equal, round and reactive to  light, extra ocular muscles intact, sclera clear, conjunctiva normal  Respiratory: No increased work of breathing, good air exchange, clear to auscultation bilaterally, no crackles or wheezing  Cardiovascular: Normal apical impulse, regular rate and rhythm, normal S1 and S2, no S3 or S4, and no murmur noted  GI: No scars, normal bowel sounds, soft, non-distended, non-tender, no masses palpated, no hepatosplenomegally  Skin: no bruising or bleeding  Musculoskeletal: no lower extremity pitting edema present  Neurologic: No focal deficit    Medications   Current Facility-Administered Medications   Medication Dose Route Frequency Provider Last Rate Last Admin    sodium chloride 0.9 % infusion   Intravenous Continuous Kayce Holbrook PA-C 125 mL/hr at 07/06/24 0959 Rate Verify at 07/06/24 0959     Current Facility-Administered Medications   Medication Dose Route Frequency Provider Last Rate Last Admin    [Held by provider] aspirin EC tablet 81 mg  81 mg Oral Daily Kayce Holbrook PA-C        [Held by provider] atorvastatin (LIPITOR) tablet 20 mg  20 mg Oral Daily Kayce Holbrook PA-C        insulin aspart (NovoLOG) injection (RAPID ACTING)  1-7 Units Subcutaneous Q4H Kayce Holbrook PA-C   1 Units at 07/05/24 2111    sodium chloride (PF) 0.9% PF flush 3 mL  3 mL Intracatheter Q8H Kayce Holbrook PA-C   3 mL at 07/05/24 1655       Data   Recent Labs   Lab 07/06/24  0911 07/06/24  0435 07/06/24  0205 07/05/24  1714 07/05/24  1020 07/05/24  0715   WBC  --   --   --   --   --  9.9   HGB  --   --   --   --   --  15.6   MCV  --   --   --   --   --  93   PLT  --   --   --   --   --  218   INR  --   --   --   --  1.05  --    NA  --   --   --   --   --  139   POTASSIUM  --   --   --   --   --  3.9   CHLORIDE  --   --   --   --   --  100   CO2  --   --   --   --   --  29   BUN  --   --   --   --   --  21.1   CR  --   --   --   --   --  0.81   ANIONGAP  --   --   --   --   --  10    ARLENE  --   --   --   --   --  10.1   * 130* 138*   < >  --  177*   ALBUMIN  --   --   --   --   --  4.3   PROTTOTAL  --   --   --   --   --  7.2   BILITOTAL  --   --   --   --   --  0.8   ALKPHOS  --   --   --   --   --  89   ALT  --   --   --   --   --  22   AST  --   --   --   --   --  18   LIPASE  --   --   --   --   --  58    < > = values in this interval not displayed.       Recent Results (from the past 24 hour(s))   XR Abdomen Port 1 View    Narrative    EXAM: XR ABDOMEN PORT 1 VIEW  LOCATION: [facility]  DATE: 7/5/2024    INDICATION: Verify NG tube placement  COMPARISON: CT 7/5/2024 at 0807 hours      Impression    IMPRESSION: An NG tube sidehole is in the distal esophagus. Recommend advancing at least 6 cm. No distended air-filled loops of small bowel.   XR Abdomen 1 View    Narrative    EXAM: XR ABDOMEN 1 VIEW  LOCATION: [facility]  DATE: 7/6/2024    INDICATION: Confirm NG placement  COMPARISON: July 5, 2024, 1844 hours      Impression    IMPRESSION: Nasogastric tube has its proximal port about 15 mm above the level of the left hemidiaphragm. Consider advancing the tube at least 4 cm.    The stomach is decompressed. There is a single loop of mildly distended air-filled small bowel in the left abdomen, 32 mm. The bowel gas pattern otherwise appears normal. Visualized lung bases are clear.

## 2024-07-06 NOTE — PROGRESS NOTES
X ray completed and per recommendation NG advanced by 4cm with some result, nursing will continue to monitor.

## 2024-07-06 NOTE — PROVIDER NOTIFICATION
MD Notification    Notified Person: MD    Notified Person Name: ESTEFANIA LI MD    Notification Date/Time:7/06/24 0209    Notification Interaction:Amcom    Purpose of Notification: 607 NG tube in place but not suctioning, flashed it and some came out with some movement/repositioning, can you put another order to do the XRAY to use if it's at the right spot?  Thanks    Orders Received:    Comments:

## 2024-07-06 NOTE — PLAN OF CARE
Summary: Abd pain, N/V, SBO, colitis  DATE & TIME: 7/6/24 6891-3144   Cognitive Concerns/ Orientation : A&O x4   BEHAVIOR & AGGRESSION TOOL COLOR: Green, calm and cooperative  ABNL VS/O2: VSS on RA  MOBILITY: Independently moves from bed to chair, but needs assistance disconnecting NG when ambulating to BR. Ambulated in halls x1, steady.   PAIN MANAGMENT: Denies  DIET: NPO  BOWEL/BLADDER: Continent, no BM but reports passing flatus  ABNL LAB/BG: , 144, 107 phosph: 2.3, replaced, recheck in AM.   DRAIN/DEVICES: PIV infusing NS @125ml/hr, NG to LIS, 700 mL brown output  TELEMETRY RHYTHM: NSR  SKIN: WDL  TESTS/PROCEDURES: Possible Gastrograffin challenge tomorrow per GI note  D/C DAY/GOALS/PLACE: Pending improvement  OTHER IMPORTANT INFO: Gen surg following.

## 2024-07-06 NOTE — PLAN OF CARE
Goal Outcome Evaluation:       Summary:Martha Neal is a 64 year old female who was admitted on 7/5/2024 for evaluation of abdominal pain with associated nausea and vomiting. CT scan was consistent with SBO with transition point.  DATE & TIME: 7/5/24 1699-1157    Cognitive Concerns/ Orientation : A&O x4   BEHAVIOR & AGGRESSION TOOL COLOR: Green   ABNL VS/O2: VSS RA  MOBILITY: SBA  PAIN MANAGMENT: Denies  DIET: NPO  BOWEL/BLADDER: Continent  ABNL LAB/BG: /130  DRAIN/DEVICES: PIV infusing NS@125ml/hr  TELEMETRY RHYTHM: NSR  SKIN: WDL  TESTS/PROCEDURES:   D/C DAY/GOALS/PLACE: Pending improvement  OTHER IMPORTANT INFO: pt with SBO, NG in place, xray done this shift to check the placement after no output, advanced and able to see some output

## 2024-07-06 NOTE — PROVIDER NOTIFICATION
MD Notification    Notified Person: MD    Notified Person Name: Kayce Holbrook    Notification Date/Time: 7/5/2024 4829     Notification Interaction: Mauricio     Purpose of Notification: Asked for order for Xray to verify placement of NG     Orders Received: Yes, ( xray noted to advance NG, NG was advanced and still no output in NG canister but nausea improved likely from prior meds.)     Comments:

## 2024-07-06 NOTE — PLAN OF CARE
Goal Outcome Evaluation:      Plan of Care Reviewed With: patient    Overall Patient Progress: improvingOverall Patient Progress: improving         Summary SBO Colitis     Hx DM II, paroxysmal supraventricular tachycardia, dyslipidemia, tobacco abuse and family history of BRCA gene mutation    Orientation A& O x 4    Vitals/Tele VSS on RM except /85 (tele SR patches need readjustment for clear read)      Denies pain but has been very nauseous   Nausea managed with PRN Zofran & then Compazine and ice pack for forehead with effect    IV Access/drains PIV infusing NS @ 125 , NG to low intermittent suction 53 @ nares later advanced to 63 at nares, no output in canister ( will need a new IV for fluids since phos was started.     Diet NPO  , 163   Phos 2.1 replacing now     Mobility Ast 1 up to bedside commode     GI/ Contintnt of B/B no BM this shift     Wound/Skin No adjustments needed     Consults Gen surg ( may do Gastrografin Tomorrow AM )    Discharge Plan Pending       See Flow sheets for assessment

## 2024-07-07 ENCOUNTER — APPOINTMENT (OUTPATIENT)
Dept: GENERAL RADIOLOGY | Facility: CLINIC | Age: 64
DRG: 389 | End: 2024-07-07
Attending: INTERNAL MEDICINE
Payer: COMMERCIAL

## 2024-07-07 LAB
ANION GAP SERPL CALCULATED.3IONS-SCNC: 10 MMOL/L (ref 7–15)
BASOPHILS # BLD AUTO: 0 10E3/UL (ref 0–0.2)
BASOPHILS NFR BLD AUTO: 0 %
BUN SERPL-MCNC: 14.6 MG/DL (ref 8–23)
CALCIUM SERPL-MCNC: 8.8 MG/DL (ref 8.8–10.2)
CHLORIDE SERPL-SCNC: 109 MMOL/L (ref 98–107)
CREAT SERPL-MCNC: 0.69 MG/DL (ref 0.51–0.95)
DEPRECATED HCO3 PLAS-SCNC: 24 MMOL/L (ref 22–29)
EGFRCR SERPLBLD CKD-EPI 2021: >90 ML/MIN/1.73M2
EOSINOPHIL # BLD AUTO: 0 10E3/UL (ref 0–0.7)
EOSINOPHIL NFR BLD AUTO: 0 %
ERYTHROCYTE [DISTWIDTH] IN BLOOD BY AUTOMATED COUNT: 13.7 % (ref 10–15)
GLUCOSE BLDC GLUCOMTR-MCNC: 109 MG/DL (ref 70–99)
GLUCOSE BLDC GLUCOMTR-MCNC: 114 MG/DL (ref 70–99)
GLUCOSE BLDC GLUCOMTR-MCNC: 117 MG/DL (ref 70–99)
GLUCOSE BLDC GLUCOMTR-MCNC: 126 MG/DL (ref 70–99)
GLUCOSE BLDC GLUCOMTR-MCNC: 127 MG/DL (ref 70–99)
GLUCOSE BLDC GLUCOMTR-MCNC: 134 MG/DL (ref 70–99)
GLUCOSE SERPL-MCNC: 119 MG/DL (ref 70–99)
HCT VFR BLD AUTO: 37.1 % (ref 35–47)
HGB BLD-MCNC: 12 G/DL (ref 11.7–15.7)
IMM GRANULOCYTES # BLD: 0 10E3/UL
IMM GRANULOCYTES NFR BLD: 0 %
LYMPHOCYTES # BLD AUTO: 1.5 10E3/UL (ref 0.8–5.3)
LYMPHOCYTES NFR BLD AUTO: 13 %
MCH RBC QN AUTO: 31.4 PG (ref 26.5–33)
MCHC RBC AUTO-ENTMCNC: 32.3 G/DL (ref 31.5–36.5)
MCV RBC AUTO: 97 FL (ref 78–100)
MONOCYTES # BLD AUTO: 1.2 10E3/UL (ref 0–1.3)
MONOCYTES NFR BLD AUTO: 11 %
NEUTROPHILS # BLD AUTO: 8.4 10E3/UL (ref 1.6–8.3)
NEUTROPHILS NFR BLD AUTO: 75 %
NRBC # BLD AUTO: 0 10E3/UL
NRBC BLD AUTO-RTO: 0 /100
PHOSPHATE SERPL-MCNC: 2.3 MG/DL (ref 2.5–4.5)
PLATELET # BLD AUTO: 150 10E3/UL (ref 150–450)
POTASSIUM SERPL-SCNC: 3.4 MMOL/L (ref 3.4–5.3)
RBC # BLD AUTO: 3.82 10E6/UL (ref 3.8–5.2)
SODIUM SERPL-SCNC: 143 MMOL/L (ref 135–145)
WBC # BLD AUTO: 11.2 10E3/UL (ref 4–11)

## 2024-07-07 PROCEDURE — 250N000009 HC RX 250: Performed by: INTERNAL MEDICINE

## 2024-07-07 PROCEDURE — 250N000011 HC RX IP 250 OP 636: Performed by: INTERNAL MEDICINE

## 2024-07-07 PROCEDURE — 258N000003 HC RX IP 258 OP 636

## 2024-07-07 PROCEDURE — 85025 COMPLETE CBC W/AUTO DIFF WBC: CPT | Performed by: INTERNAL MEDICINE

## 2024-07-07 PROCEDURE — 80048 BASIC METABOLIC PNL TOTAL CA: CPT | Performed by: INTERNAL MEDICINE

## 2024-07-07 PROCEDURE — 120N000001 HC R&B MED SURG/OB

## 2024-07-07 PROCEDURE — 84100 ASSAY OF PHOSPHORUS: CPT | Performed by: INTERNAL MEDICINE

## 2024-07-07 PROCEDURE — 258N000003 HC RX IP 258 OP 636: Performed by: INTERNAL MEDICINE

## 2024-07-07 PROCEDURE — 99232 SBSQ HOSP IP/OBS MODERATE 35: CPT | Performed by: INTERNAL MEDICINE

## 2024-07-07 PROCEDURE — 74018 RADEX ABDOMEN 1 VIEW: CPT

## 2024-07-07 PROCEDURE — 250N000013 HC RX MED GY IP 250 OP 250 PS 637: Performed by: INTERNAL MEDICINE

## 2024-07-07 PROCEDURE — 36415 COLL VENOUS BLD VENIPUNCTURE: CPT | Performed by: INTERNAL MEDICINE

## 2024-07-07 RX ADMIN — PANTOPRAZOLE SODIUM 40 MG: 40 INJECTION, POWDER, FOR SOLUTION INTRAVENOUS at 10:07

## 2024-07-07 RX ADMIN — DIATRIZOATE MEGLUMINE AND DIATRIZOATE SODIUM 90 ML: 660; 100 SOLUTION ORAL; RECTAL at 20:24

## 2024-07-07 RX ADMIN — SODIUM CHLORIDE: 9 INJECTION, SOLUTION INTRAVENOUS at 12:00

## 2024-07-07 RX ADMIN — SODIUM CHLORIDE: 9 INJECTION, SOLUTION INTRAVENOUS at 19:58

## 2024-07-07 RX ADMIN — SODIUM PHOSPHATE, MONOBASIC, MONOHYDRATE AND SODIUM PHOSPHATE, DIBASIC, ANHYDROUS 9 MMOL: 142; 276 INJECTION, SOLUTION INTRAVENOUS at 11:42

## 2024-07-07 RX ADMIN — Medication 1 LOZENGE: at 14:21

## 2024-07-07 RX ADMIN — Medication 1 LOZENGE: at 12:00

## 2024-07-07 RX ADMIN — Medication 1 LOZENGE: at 15:46

## 2024-07-07 RX ADMIN — SODIUM CHLORIDE: 9 INJECTION, SOLUTION INTRAVENOUS at 03:48

## 2024-07-07 RX ADMIN — Medication 1 LOZENGE: at 21:49

## 2024-07-07 ASSESSMENT — ACTIVITIES OF DAILY LIVING (ADL)
ADLS_ACUITY_SCORE: 25
ADLS_ACUITY_SCORE: 24
ADLS_ACUITY_SCORE: 25
ADLS_ACUITY_SCORE: 24
ADLS_ACUITY_SCORE: 25
ADLS_ACUITY_SCORE: 24
ADLS_ACUITY_SCORE: 25
ADLS_ACUITY_SCORE: 24
ADLS_ACUITY_SCORE: 25

## 2024-07-07 NOTE — PROGRESS NOTES
[facility]    Hospitalist Progress Note    Brief Summary:  Martha Neal is a 64 year old female who has a past medical history of DM II, paroxysmal supraventricular tachycardia, dyslipidemia, tobacco abuse and family history of BRCA gene mutation, among others, who was admitted on 7/5/2024 for further evaluation of abdominal pain with associated nausea and vomiting and found to have small bowel obstruction.     Assessment & Plan        Small bowel obstruction   Abdominal pain, nausea and vomiting secondary to above   Suspected colitis on CT     This is a 64-year-old female with history of diabetes mellitus type 2, hyperlipidemia, history of hysterectomy appendectomy in the past, with nausea vomiting abdominal pain found to small bowel obstruction on CT scan with transition point.  At this point her abdomen is soft and benign  She is status post NG tube placement, with advancement NG tube, about 1.4 L consult since yesterday  Continue IV fluids, keep NPO.  Intake and output  charting Daily weights.  Started her on IV Protonix 40 daily.  And nausea vomiting abdominal pain is now improved, passing some gas as well.  Will defer Gastrografin challenge to general surgery.  Continue with the nonoperative conservative management at this time.  Most likely will improve.  Given antiemetic protocol, pain medication as needed.         Type 2 diabetes mellitus       Recent Labs   Lab Test 04/01/24  1128   A1C 7.1*    PTA regimen includes:  - Hold PTA metformin. Resume at discharge.    - Medium insulin sliding scale ordered.  - Glucose checks QID.    - Hypoglycemic protocol in place.    Blood sugar reasonably controlled at this time.  Agree with holding metformin at this time     Hypophosphatemia   *Phos 2.1 07/05.  Replaced yesterday but still low 2.3  -She is on electrolyte replacement protocol replace as per protocol     Tobacco Abuse  History of suspected undiagnosed COPD   *Current every day  smoker >15 cigarettes since 13 years old (~39 pack-year history).   - Tobacco cessation advised.   - Nicotine patch available PRN.      Paroxysmal supraventricular tachycardia   Hyperlipidemia   *Zio Patch 2018 demonstrated less than 1% of the time with longest episode 15 beats. No treatment indicated at time given infrequent symptoms.   - Telemetry.   - Hold PTA statin and ASA 81 mg.,  As patient       Vitamin D deficiency   *Vitamin D 62 04/2024.   - Continue to monitor outpatient.      History of White coat syndrome without hypertension   - Hydralazine and labetalol PRN for SBP >180.   - Continue to monitor.   Blood pressure well-controlled at this time       DVT Prophylaxis: Pneumatic Compression Devices  Code Status: No CPR- Do NOT Intubate        Medically Ready for Discharge: Anticipated in 2-4 Days        Willis Bejarano MD, MD  Text Page  (7am - 6pm)    Interval History   Patient walking in the hallway, NG tube slightly out, is bothering her, having some tearing, wanted to have the NG tube out as soon as possible.  She is passing gas but no BM.  Denies any fever chills chest pain abdominal pain at this time    Significant NG output about 1.4 L    No other significant event    -Data reviewed today: I reviewed all new labs and imaging results over the last 24 hours. I personally reviewed no images or EKG's today.    Physical Exam   Temp: 97.8  F (36.6  C) Temp src: Oral BP: 129/64 Pulse: 54   Resp: 16 SpO2: 99 % O2 Device: None (Room air)    Vitals:    07/05/24 2100 07/06/24 0437 07/07/24 0815   Weight: 58.8 kg (129 lb 9.6 oz) 61.3 kg (135 lb 2.3 oz) 61.6 kg (135 lb 11.2 oz)     Vital Signs with Ranges  Temp:  [97.8  F (36.6  C)-98.8  F (37.1  C)] 97.8  F (36.6  C)  Pulse:  [54-75] 54  Resp:  [16-18] 16  BP: (129-140)/(64-81) 129/64  SpO2:  [94 %-99 %] 99 %  I/O last 3 completed shifts:  In: -   Out: 1450 [Emesis/NG output:1450]    Constitutional: awake, alert, cooperative, no apparent distress, and appears  stated age  Eyes: Pupils equal and reactive to light, teary eyes  Conjunctiva normal  Respiratory: No increased work of breathing, good air exchange, clear to auscultation bilaterally, no crackles or wheezing  Cardiovascular: Normal apical impulse, regular rate and rhythm, normal S1 and S2, no S3 or S4, and no murmur noted  GI: No scars, normal bowel sounds, soft, non-distended, non-tender, no masses palpated, no hepatosplenomegally  Skin: no bruising or bleeding  Musculoskeletal: no lower extremity pitting edema present  Neurologic: No focal deficit    Medications   Current Facility-Administered Medications   Medication Dose Route Frequency Provider Last Rate Last Admin    sodium chloride 0.9 % infusion   Intravenous Continuous Kayce Holbrook PA-C 125 mL/hr at 07/07/24 0348 New Bag at 07/07/24 0348     Current Facility-Administered Medications   Medication Dose Route Frequency Provider Last Rate Last Admin    [Held by provider] aspirin EC tablet 81 mg  81 mg Oral Daily Kayce Holbrook PA-C        [Held by provider] atorvastatin (LIPITOR) tablet 20 mg  20 mg Oral Daily Kayce Holbrook PA-C        insulin aspart (NovoLOG) injection (RAPID ACTING)  1-7 Units Subcutaneous Q4H Kayce Holbrook PA-C   1 Units at 07/06/24 1315    pantoprazole (PROTONIX) IV push injection 40 mg  40 mg Intravenous Daily Willis Bejarano MD   40 mg at 07/07/24 1007    sodium chloride (PF) 0.9% PF flush 3 mL  3 mL Intracatheter Q8H Kayce Holbrook PA-C   3 mL at 07/05/24 1655    sodium phosphate 9 mmol in 250 mL NS intermittent infusion  9 mmol Intravenous Once Willis Bejarano MD           Data   Recent Labs   Lab 07/07/24  0844 07/07/24  0820 07/07/24  0403 07/06/24  1305 07/06/24  1228 07/05/24  1714 07/05/24  1020 07/05/24  0715   WBC 11.2*  --   --   --  9.4  --   --  9.9   HGB 12.0  --   --   --  12.2  --   --  15.6   MCV 97  --   --   --  97  --   --  93     --   --   --   147*  --   --  218   INR  --   --   --   --   --   --  1.05  --      --   --   --  145  --   --  139   POTASSIUM 3.4  --   --   --  3.5  --   --  3.9   CHLORIDE 109*  --   --   --  109*  --   --  100   CO2 24  --   --   --  28  --   --  29   BUN 14.6  --   --   --  14.5  --   --  21.1   CR 0.69  --   --   --  0.83  --   --  0.81   ANIONGAP 10  --   --   --  8  --   --  10   ARLENE 8.8  --   --   --  8.8  --   --  10.1   * 109* 134*   < > 144*   < >  --  177*   ALBUMIN  --   --   --   --  3.5  --   --  4.3   PROTTOTAL  --   --   --   --  5.7*  --   --  7.2   BILITOTAL  --   --   --   --  0.8  --   --  0.8   ALKPHOS  --   --   --   --  68  --   --  89   ALT  --   --   --   --  14  --   --  22   AST  --   --   --   --  15  --   --  18   LIPASE  --   --   --   --   --   --   --  58    < > = values in this interval not displayed.       No results found for this or any previous visit (from the past 24 hour(s)).

## 2024-07-07 NOTE — PLAN OF CARE
Summary: Abd pain, N/V, SBO, colitis  DATE & TIME: 7/7/24 8725-1706  Cognitive Concerns/ Orientation : A&O x4   BEHAVIOR & AGGRESSION TOOL COLOR: Green, calm, cooperative and very pleasant  ABNL VS/O2: VSS on RA  MOBILITY: Independent needs assistance with IV and disconnecting NG when ambulating   PAIN MANAGMENT: C/o sore throat, PRN throat lozenges given  DIET: NPO, ok to have few ice chips per MD  BOWEL/BLADDER: Continent, no BM this shift, reports passing flatus, BS audible and normoactive  ABNL LAB/BG: , 117, 126, phosph: 2.3, replaced, recheck in AM   DRAIN/DEVICES: PIV infusing NS @125ml/hr, NG to LIS-- pulled out slightly, advanced to landmark of 56 cm, xray for confirmation of placement  TELEMETRY RHYTHM: NSR  SKIN: WDL  TESTS/PROCEDURES: Gastrograffin challenge this PM  D/C DAY/GOALS/PLACE: Pending improvement  OTHER IMPORTANT INFO: General surgery following.

## 2024-07-07 NOTE — PLAN OF CARE
Goal Outcome Evaluation:         DATE & TIME: 7/6/24 [id number]  Cognitive Concerns/ Orientation : A&O x4   BEHAVIOR & AGGRESSION TOOL COLOR: Green, calm and cooperative  ABNL VS/O2: VSS on RA  MOBILITY: Independently moves from bed to chair, but needs assistance disconnecting NG when ambulating to BR. Ambulated in halls x1 with SBA, steady  PAIN MANAGMENT: Denies  DIET: NPO  BOWEL/BLADDER: Continent, no BM this shift,reports passing flatus  ABNL LAB/BG: ,  phosph: 2.3/replaced, recheck in AM   DRAIN/DEVICES: PIV infusing NS @125ml/hr, NG to LIS, 300 mL brown output  TELEMETRY RHYTHM: NSR  SKIN: WDL  TESTS/PROCEDURES: Possible Gastrograffin challenge tomorrow per GI note  D/C DAY/GOALS/PLACE: Pending improvement  OTHER IMPORTANT INFO: Pt alert/pleasant, denies pain/N/V, abd soft, BS hypoactive, Gen surg following.

## 2024-07-07 NOTE — PROGRESS NOTES
[facility]  GENERAL SURGERY Progress Note    Admission Date: 2024         Assessment and Plan:     Martha Neal is a 64 year old female with PMH of T2DM, HLD, hysterectomy who presented on 2024 with abdominal pain, nausea, and vomiting x 5 days.  CT scan was consistent with SBO with transition point. NG tube placed overnight for nausea, but some difficulty with advancement into the stomach, although once in appropriate position, had approximately 1 L of output.  Passing flatus this morning, but no BM.  - Continue NPO   - Gastrografin challenge today - instill 90 mL of undiluted gastrografin into NG tube, then clamp NG tube.  Obtain XR 8 hours after contrast administration, then place NG tube back to LIS. Will plan to do this overnight.  - NG tube to LIS until gastrografin administered    Patient discussed with Dr. Butterfield, staff surgeon.    Janki Jackson, DO  General Surgery, PGY-4  Pager: [phone]             Interval History:     Pain controlled, + flatus, - BM, UO adequate. Ambulating  Meds reviewed.                      Physical Exam:   Blood pressure 129/64, pulse 54, temperature 97.8  F (36.6  C), temperature source Oral, resp. rate 16, weight 61.6 kg (135 lb 11.2 oz), SpO2 99%, not currently breastfeeding.  Temperature Temp  Av.1  F (36.7  C)  Min: 97.2  F (36.2  C)  Max: 98.5  F (36.9  C)   I/O last 3 completed shifts:  In: -   Out: 1450 [Emesis/NG output:1450]  Constitutional:  Awake and in no apparent distress.   Lungs: Breathing comfortably on room air   Cardiovascular: Extremities warm and well perfused   Abdomen: Soft, non-distended, mild tenderness in periumbilical area   Extremities: No edema. Moves all 4 extremities          Data:     Recent Labs   Lab Test 24  0844 24  1228 24  0715   WBC 11.2* 9.4 9.9   HGB 12.0 12.2 15.6   HCT 37.1 37.9 45.8    147* 218      Recent Labs   Lab Test 24  0844 24  1228 24  0715     145 139   POTASSIUM 3.4 3.5 3.9   CHLORIDE 109* 109* 100   CO2 24 28 29   BUN 14.6 14.5 21.1   CR 0.69 0.83 0.81     Recent Labs   Lab Test 07/06/24  1228 07/05/24  0715 04/01/24  1128   BILITOTAL 0.8 0.8 0.5   ALT 14 22 30   AST 15 18 26   ALKPHOS 68 89 92   LIPASE  --  58  --      Recent Labs   Lab Test 07/05/24  1020   INR 1.05   PTT 28

## 2024-07-07 NOTE — PLAN OF CARE
Goal Outcome Evaluation:       Summary: Abd pain, N/V, SBO, colitis  DATE & TIME: 7/7/24 @3102-5678  Cognitive Concerns/ Orientation : A&O x4   BEHAVIOR & AGGRESSION TOOL COLOR: Green,   ABNL VS/O2: VSS on RA  MOBILITY: Independent needs assistance with IV and disconnecting NG when ambulating   PAIN MANAGMENT: Denies  DIET: NPO, with oral ice swish mouth  BOWEL/BLADDER: Continent, no BM this shift,reports passing flatus, hypo active BS  ABNL LAB/BG: ,  phosph: 2.3/replaced, recheck in AM   DRAIN/DEVICES: PIV infusing NS @125ml/hr, NG to LIS, 300 mL brown output  TELEMETRY RHYTHM: NSR  SKIN: WDL  TESTS/PROCEDURES: Possible Gastrograffin challenge tomorrow per GI note  D/C DAY/GOALS/PLACE: Pending improvement  OTHER IMPORTANT INFO: Pt A&O Luis pain or nausea at this time. NG has dark brown out put, continue on IVF plan to go home pending.

## 2024-07-08 ENCOUNTER — APPOINTMENT (OUTPATIENT)
Dept: GENERAL RADIOLOGY | Facility: CLINIC | Age: 64
DRG: 389 | End: 2024-07-08
Attending: STUDENT IN AN ORGANIZED HEALTH CARE EDUCATION/TRAINING PROGRAM
Payer: COMMERCIAL

## 2024-07-08 LAB
ANION GAP SERPL CALCULATED.3IONS-SCNC: 9 MMOL/L (ref 7–15)
BASOPHILS # BLD AUTO: 0 10E3/UL (ref 0–0.2)
BASOPHILS NFR BLD AUTO: 0 %
BUN SERPL-MCNC: 14.5 MG/DL (ref 8–23)
CALCIUM SERPL-MCNC: 8.6 MG/DL (ref 8.8–10.2)
CHLORIDE SERPL-SCNC: 110 MMOL/L (ref 98–107)
CREAT SERPL-MCNC: 0.62 MG/DL (ref 0.51–0.95)
DEPRECATED HCO3 PLAS-SCNC: 24 MMOL/L (ref 22–29)
EGFRCR SERPLBLD CKD-EPI 2021: >90 ML/MIN/1.73M2
EOSINOPHIL # BLD AUTO: 0 10E3/UL (ref 0–0.7)
EOSINOPHIL NFR BLD AUTO: 0 %
ERYTHROCYTE [DISTWIDTH] IN BLOOD BY AUTOMATED COUNT: 13.7 % (ref 10–15)
GLUCOSE BLDC GLUCOMTR-MCNC: 117 MG/DL (ref 70–99)
GLUCOSE BLDC GLUCOMTR-MCNC: 120 MG/DL (ref 70–99)
GLUCOSE BLDC GLUCOMTR-MCNC: 125 MG/DL (ref 70–99)
GLUCOSE BLDC GLUCOMTR-MCNC: 131 MG/DL (ref 70–99)
GLUCOSE BLDC GLUCOMTR-MCNC: 135 MG/DL (ref 70–99)
GLUCOSE BLDC GLUCOMTR-MCNC: 136 MG/DL (ref 70–99)
GLUCOSE SERPL-MCNC: 192 MG/DL (ref 70–99)
HCT VFR BLD AUTO: 35.7 % (ref 35–47)
HGB BLD-MCNC: 11.8 G/DL (ref 11.7–15.7)
IMM GRANULOCYTES # BLD: 0.1 10E3/UL
IMM GRANULOCYTES NFR BLD: 1 %
LYMPHOCYTES # BLD AUTO: 1.4 10E3/UL (ref 0.8–5.3)
LYMPHOCYTES NFR BLD AUTO: 10 %
MCH RBC QN AUTO: 31.4 PG (ref 26.5–33)
MCHC RBC AUTO-ENTMCNC: 33.1 G/DL (ref 31.5–36.5)
MCV RBC AUTO: 95 FL (ref 78–100)
MONOCYTES # BLD AUTO: 1.4 10E3/UL (ref 0–1.3)
MONOCYTES NFR BLD AUTO: 10 %
NEUTROPHILS # BLD AUTO: 10.6 10E3/UL (ref 1.6–8.3)
NEUTROPHILS NFR BLD AUTO: 78 %
NRBC # BLD AUTO: 0 10E3/UL
NRBC BLD AUTO-RTO: 0 /100
PHOSPHATE SERPL-MCNC: 1.5 MG/DL (ref 2.5–4.5)
PHOSPHATE SERPL-MCNC: 1.5 MG/DL (ref 2.5–4.5)
PLATELET # BLD AUTO: 143 10E3/UL (ref 150–450)
POTASSIUM SERPL-SCNC: 3.2 MMOL/L (ref 3.4–5.3)
POTASSIUM SERPL-SCNC: 3.4 MMOL/L (ref 3.4–5.3)
RBC # BLD AUTO: 3.76 10E6/UL (ref 3.8–5.2)
SODIUM SERPL-SCNC: 143 MMOL/L (ref 135–145)
WBC # BLD AUTO: 13.5 10E3/UL (ref 4–11)

## 2024-07-08 PROCEDURE — 74018 RADEX ABDOMEN 1 VIEW: CPT

## 2024-07-08 PROCEDURE — 250N000013 HC RX MED GY IP 250 OP 250 PS 637: Performed by: INTERNAL MEDICINE

## 2024-07-08 PROCEDURE — 36415 COLL VENOUS BLD VENIPUNCTURE: CPT | Performed by: INTERNAL MEDICINE

## 2024-07-08 PROCEDURE — 258N000003 HC RX IP 258 OP 636: Performed by: INTERNAL MEDICINE

## 2024-07-08 PROCEDURE — 84100 ASSAY OF PHOSPHORUS: CPT | Performed by: INTERNAL MEDICINE

## 2024-07-08 PROCEDURE — 250N000013 HC RX MED GY IP 250 OP 250 PS 637: Performed by: SURGERY

## 2024-07-08 PROCEDURE — 250N000011 HC RX IP 250 OP 636: Performed by: INTERNAL MEDICINE

## 2024-07-08 PROCEDURE — 99232 SBSQ HOSP IP/OBS MODERATE 35: CPT | Performed by: INTERNAL MEDICINE

## 2024-07-08 PROCEDURE — 80048 BASIC METABOLIC PNL TOTAL CA: CPT | Performed by: INTERNAL MEDICINE

## 2024-07-08 PROCEDURE — 120N000001 HC R&B MED SURG/OB

## 2024-07-08 PROCEDURE — 250N000009 HC RX 250: Performed by: INTERNAL MEDICINE

## 2024-07-08 PROCEDURE — 84132 ASSAY OF SERUM POTASSIUM: CPT | Performed by: INTERNAL MEDICINE

## 2024-07-08 PROCEDURE — 85025 COMPLETE CBC W/AUTO DIFF WBC: CPT | Performed by: INTERNAL MEDICINE

## 2024-07-08 RX ORDER — POTASSIUM CHLORIDE 1.5 G/1.58G
40 POWDER, FOR SOLUTION ORAL ONCE
Status: COMPLETED | OUTPATIENT
Start: 2024-07-08 | End: 2024-07-08

## 2024-07-08 RX ORDER — POTASSIUM CHLORIDE 1500 MG/1
40 TABLET, EXTENDED RELEASE ORAL ONCE
Status: COMPLETED | OUTPATIENT
Start: 2024-07-08 | End: 2024-07-08

## 2024-07-08 RX ORDER — LORATADINE 10 MG/1
10 TABLET ORAL DAILY
Status: DISCONTINUED | OUTPATIENT
Start: 2024-07-08 | End: 2024-07-09 | Stop reason: HOSPADM

## 2024-07-08 RX ORDER — FLUTICASONE PROPIONATE 50 MCG
1 SPRAY, SUSPENSION (ML) NASAL DAILY
Status: DISCONTINUED | OUTPATIENT
Start: 2024-07-08 | End: 2024-07-09 | Stop reason: HOSPADM

## 2024-07-08 RX ADMIN — FLUTICASONE PROPIONATE 1 SPRAY: 50 SPRAY, METERED NASAL at 11:43

## 2024-07-08 RX ADMIN — ASPIRIN 81 MG: 81 TABLET, COATED ORAL at 11:44

## 2024-07-08 RX ADMIN — Medication 1 LOZENGE: at 11:45

## 2024-07-08 RX ADMIN — POTASSIUM & SODIUM PHOSPHATES POWDER PACK 280-160-250 MG 2 PACKET: 280-160-250 PACK at 21:55

## 2024-07-08 RX ADMIN — ATORVASTATIN CALCIUM 20 MG: 20 TABLET, FILM COATED ORAL at 11:44

## 2024-07-08 RX ADMIN — POTASSIUM CHLORIDE 40 MEQ: 1.5 POWDER, FOR SOLUTION ORAL at 12:35

## 2024-07-08 RX ADMIN — PANTOPRAZOLE SODIUM 40 MG: 40 INJECTION, POWDER, FOR SOLUTION INTRAVENOUS at 08:07

## 2024-07-08 RX ADMIN — POTASSIUM CHLORIDE 40 MEQ: 1.5 POWDER, FOR SOLUTION ORAL at 21:55

## 2024-07-08 RX ADMIN — SODIUM PHOSPHATE, MONOBASIC, MONOHYDRATE AND SODIUM PHOSPHATE, DIBASIC, ANHYDROUS 15 MMOL: 142; 276 INJECTION, SOLUTION INTRAVENOUS at 12:53

## 2024-07-08 RX ADMIN — LORATADINE 10 MG: 10 TABLET ORAL at 11:44

## 2024-07-08 ASSESSMENT — ACTIVITIES OF DAILY LIVING (ADL)
ADLS_ACUITY_SCORE: 24
ADLS_ACUITY_SCORE: 25
ADLS_ACUITY_SCORE: 24
ADLS_ACUITY_SCORE: 25
ADLS_ACUITY_SCORE: 25
ADLS_ACUITY_SCORE: 24
ADLS_ACUITY_SCORE: 25
ADLS_ACUITY_SCORE: 24
ADLS_ACUITY_SCORE: 25
ADLS_ACUITY_SCORE: 25
ADLS_ACUITY_SCORE: 24
ADLS_ACUITY_SCORE: 25

## 2024-07-08 NOTE — PLAN OF CARE
Goal Outcome Evaluation:  Summary: Abd pain, N/V, SBO, colitis  DATE & TIME: 7/7-07/08/24 0709-8981  Cognitive Concerns/ Orientation : A&O x4   BEHAVIOR & AGGRESSION TOOL COLOR: Green, calm, cooperative and very pleasant  ABNL VS/O2: VSS on RA  MOBILITY: Independent needs assistance with IV and disconnecting NG when ambulating.   PAIN MANAGMENT: C/O sore throat, PRN throat lozenges given  DIET: NPO, ok to have few ice chips per MD  BOWEL/BLADDER: Continent, 1 small BM this shift, reports passing flatus, BS hypoactive  ABNL LAB/BG: , 125, phosph recheck is in AM   DRAIN/DEVICES: PIV infusing NS @125ml/hr, Gastrograffin given NG clamped; Xray taken this shift. NG tube placed back on LIS.   TELEMETRY RHYTHM: NSR  SKIN: WDL  TESTS/PROCEDURES: Gastrograffin challenge started  D/C DAY/GOALS/PLACE: Pending improvement  OTHER IMPORTANT INFO: General surgery following.

## 2024-07-08 NOTE — PLAN OF CARE
Goal Outcome Evaluation:  DATE & TIME:07/08/24 3222-8737  Cognitive Concerns/ Orientation : A&O x4   BEHAVIOR & AGGRESSION TOOL COLOR: Green, calm, cooperative and very pleasant  ABNL VS/O2: VSS on RA,Ghulam  MOBILITY: Independent .  PAIN MANAGMENT: C/O sore throat, PRN throat lozenges given  DIET:CL, tolerating.  BOWEL/BLADDER: Continent, 1 small BM this shift, reports passing flatus, BS hypoactive  ABNL LAB/BG: /35, K 3.2, replaced recheck at 1600 phos 1.5, replacement in progress, recheck at 1900  DRAIN/DEVICES: PIV SL, NG discontinued.   TELEMETRY RHYTHM: NA  SKIN: WDL  TESTS/PROCEDURES:none  D/C DAY/GOALS/PLACE: Pending improvement 1-2 days.  OTHER IMPORTANT INFO: General surgery following. Plan to advance diet  and discharge tomorrow if stable.

## 2024-07-08 NOTE — PROGRESS NOTES
[facility]    Hospitalist Progress Note    Brief Summary:  Martha Neal is a 64 year old female who has a past medical history of DM II, paroxysmal supraventricular tachycardia, dyslipidemia, tobacco abuse and family history of BRCA gene mutation, among others, who was admitted on 7/5/2024 for further evaluation of abdominal pain with associated nausea and vomiting and found to have small bowel obstruction.     Assessment & Plan        Small bowel obstruction: Resolved  Abdominal pain, nausea and vomiting secondary to above   Suspected colitis on CT     This is a 64-year-old female with history of diabetes mellitus type 2, hyperlipidemia, history of hysterectomy appendectomy in the past, with nausea vomiting abdominal pain found to small bowel obstruction on CT scan with transition point.  At this point her abdomen is soft and benign  She is status post NG tube placement, with advancement NG tube, about 1.4 L consult since yesterday  Continue IV fluids,   Gastrografin challenge on 7/7/2024, posterior and abdominal x-ray shows contrast material within the colon post administration no dilated loops of small bowel.  NG tube removed 7/8/2024, clear liquid diet as tolerated.  Intake and output  charting Daily weights.  Started her on IV Protonix 40 daily.    Overall feeling better, small bowel obstruction is resolved at this time, will see how she tolerates oral diet.  Advance diet as tolerated to low fiber diet       Type 2 diabetes mellitus       Recent Labs   Lab Test 04/01/24  1128   A1C 7.1*    PTA regimen includes:  - Hold PTA metformin. Resume at discharge.    - Medium insulin sliding scale ordered.  - Glucose checks QID.    - Hypoglycemic protocol in place.    Blood sugar reasonably controlled at this time.  Agree with holding metformin at this time     Hypophosphatemia   *Phos 2.1 07/05.  Replaced yesterday but still low 2.3  -She is on electrolyte replacement protocol replace as per  protocol     Tobacco Abuse  History of suspected undiagnosed COPD   *Current every day smoker >15 cigarettes since 13 years old (~39 pack-year history).   - Tobacco cessation advised.   - Nicotine patch available PRN.      Paroxysmal supraventricular tachycardia   Hyperlipidemia   *Zio Patch 2018 demonstrated less than 1% of the time with longest episode 15 beats. No treatment indicated at time given infrequent symptoms.   - Telemetry.   - Hold PTA statin and ASA 81 mg.  Resume aspirin and PTA statin       Vitamin D deficiency   *Vitamin D 62 04/2024.   - Continue to monitor outpatient.      History of White coat syndrome without hypertension   - Hydralazine and labetalol PRN for SBP >180.   - Continue to monitor.   Blood pressure well-controlled at this time    Postnasal drip  Patient has significant postnasal drip and nasal congestion  Started on Flonase nasal spray and Claritin 10 mg daily    DVT Prophylaxis: Pneumatic Compression Devices  Code Status: No CPR- Do NOT Intubate        Medically Ready for Discharge: Anticipated Tomorrow        Willis Bejarano MD, MD  Text Page  (7am - 6pm)    Interval History   Patient seen and evaluated this morning, she had a Gastrografin challenge yesterday, had 3 with BM since 3 AM in the morning, passing gas.  NG tube is out early this morning.  She is having some sore throat postnasal drip nasal congestion and teary eyes.    No fever chills chest pain abdominal pain nausea vomiting headache dizziness or lightheadedness    No other significant event over the    -Data reviewed today: I reviewed all new labs and imaging results over the last 24 hours. I personally reviewed no images or EKG's today.    Physical Exam   Temp: 98.1  F (36.7  C) Temp src: Oral BP: (!) 144/68 Pulse: 58   Resp: 18 SpO2: 97 % O2 Device: None (Room air)    Vitals:    07/06/24 0437 07/07/24 0815 07/08/24 0537   Weight: 61.3 kg (135 lb 2.3 oz) 61.6 kg (135 lb 11.2 oz) 62.7 kg (138 lb 4.8 oz)     Vital Signs  with Ranges  Temp:  [97.8  F (36.6  C)-99.6  F (37.6  C)] 98.1  F (36.7  C)  Pulse:  [52-65] 58  Resp:  [16-18] 18  BP: (129-144)/(64-77) 144/68  SpO2:  [97 %-99 %] 97 %  I/O last 3 completed shifts:  In: -   Out: 650 [Emesis/NG output:650]    Constitutional: awake, alert, cooperative, no apparent distress, and appears stated age  Eyes/ENT: Pupils equal and reactive to light, teary eyes, bilateral nasal congestion, postnasal  Conjunctiva normal  Respiratory: No increased work of breathing, good air exchange, clear to auscultation bilaterally, no crackles or wheezing  Cardiovascular: Normal apical impulse, regular rate and rhythm, normal S1 and S2, no S3 or S4, and no murmur noted  GI: No scars, normal bowel sounds, soft, non-distended, non-tender, no masses palpated, no hepatosplenomegally  Skin: no bruising or bleeding  Musculoskeletal: no lower extremity pitting edema present  Neurologic: No focal deficit    Medications   Current Facility-Administered Medications   Medication Dose Route Frequency Provider Last Rate Last Admin     Current Facility-Administered Medications   Medication Dose Route Frequency Provider Last Rate Last Admin    [Held by provider] aspirin EC tablet 81 mg  81 mg Oral Daily Kayce Holbrook PA-C        [Held by provider] atorvastatin (LIPITOR) tablet 20 mg  20 mg Oral Daily Kayce Holbrook PA-C        fluticasone (FLONASE) 50 MCG/ACT spray 1 spray  1 spray Both Nostrils Daily Willis Bejarano MD        insulin aspart (NovoLOG) injection (RAPID ACTING)  1-7 Units Subcutaneous Q4H Kayce Holbrook PA-C   1 Units at 07/06/24 1315    loratadine (CLARITIN) tablet 10 mg  10 mg Oral Daily Willis Bejarano MD        pantoprazole (PROTONIX) IV push injection 40 mg  40 mg Intravenous Daily Willis Bejarano MD   40 mg at 07/08/24 0807    sodium chloride (PF) 0.9% PF flush 3 mL  3 mL Intracatheter Q8H Kayce Holbrook PA-C   3 mL at 07/08/24 0808       Data    Recent Labs   Lab 07/08/24  0819 07/08/24  0511 07/08/24  0138 07/07/24  1222 07/07/24  0844 07/06/24  1305 07/06/24  1228 07/05/24  1714 07/05/24  1020 07/05/24  0715   WBC  --   --   --   --  11.2*  --  9.4  --   --  9.9   HGB  --   --   --   --  12.0  --  12.2  --   --  15.6   MCV  --   --   --   --  97  --  97  --   --  93   PLT  --   --   --   --  150  --  147*  --   --  218   INR  --   --   --   --   --   --   --   --  1.05  --    NA  --   --   --   --  143  --  145  --   --  139   POTASSIUM  --   --   --   --  3.4  --  3.5  --   --  3.9   CHLORIDE  --   --   --   --  109*  --  109*  --   --  100   CO2  --   --   --   --  24  --  28  --   --  29   BUN  --   --   --   --  14.6  --  14.5  --   --  21.1   CR  --   --   --   --  0.69  --  0.83  --   --  0.81   ANIONGAP  --   --   --   --  10  --  8  --   --  10   ARLENE  --   --   --   --  8.8  --  8.8  --   --  10.1   * 117* 125*   < > 119*   < > 144*   < >  --  177*   ALBUMIN  --   --   --   --   --   --  3.5  --   --  4.3   PROTTOTAL  --   --   --   --   --   --  5.7*  --   --  7.2   BILITOTAL  --   --   --   --   --   --  0.8  --   --  0.8   ALKPHOS  --   --   --   --   --   --  68  --   --  89   ALT  --   --   --   --   --   --  14  --   --  22   AST  --   --   --   --   --   --  15  --   --  18   LIPASE  --   --   --   --   --   --   --   --   --  58    < > = values in this interval not displayed.       Recent Results (from the past 24 hour(s))   XR Abdomen 1 View    Narrative    EXAM: XR ABDOMEN 1 VIEW  LOCATION: [facility]  DATE: 7/7/2024    INDICATION: NG tube placement  COMPARISON: 07/06/2024.      Impression    IMPRESSION: Interval advancement of the gastric suction tube with distal tip projecting at the gastric body and proximal side port at the gastric fundus. Bowel gas pattern is incompletely imaged but appears nonobstructive.   XR Gastrografin Challenge    Narrative    EXAM: XR GASTROGRAFIN CHALLENGE  LOCATION:   [facility]  DATE: 7/8/2024    INDICATION: Small Bowel Obstruction  COMPARISON: 7/6/2024  TECHNIQUE: Routine water soluble contrast follow-through challenge.      Impression    IMPRESSION:  1.  Water soluble contrast material IS identified within the colon post administration.  2.  No dilated loops of small bowel.

## 2024-07-08 NOTE — PROGRESS NOTES
[facility]  GENERAL SURGERY Progress Note    Admission Date: 2024         Assessment and Plan:     Martha Neal is a 64 year old female with PMH of T2DM, HLD, hysterectomy who presented on 2024 with abdominal pain, nausea, and vomiting x 5 days.  CT scan was consistent with SBO with transition point. NG tube placed overnight for nausea, but some difficulty with advancement into the stomach, although once in appropriate position, had approximately 1 L of output.      Yesterday, had gastrografin challenge an dhad multiple stools.  AXR after with contrast in colon and no dilated loops of bowel.    -Remove NGT this am.    -Start liquids.  -Likely home tomorrow if continued progress.    Ino Canada PA-C  [phone]             Interval History:     Pain controlled, + flatus, + BM, UO adequate. Ambulating  Feels much better.  Throat irritated from NGT.                     Physical Exam:   Blood pressure (!) 144/68, pulse 58, temperature 98.1  F (36.7  C), temperature source Oral, resp. rate 18, weight 62.7 kg (138 lb 4.8 oz), SpO2 97%, not currently breastfeeding.  Temperature Temp  Av.1  F (36.7  C)  Min: 97.2  F (36.2  C)  Max: 98.5  F (36.9  C)   I/O last 3 completed shifts:  In: -   Out: 650 [Emesis/NG output:650]  Constitutional:  Awake and in no apparent distress.   Lungs: Breathing comfortably on room air   Cardiovascular: Extremities warm and well perfused   Abdomen: Soft, non-distended, mild tenderness in periumbilical area   Extremities: No edema. Moves all 4 extremities          Data:     Recent Labs   Lab Test 24  0844 24  1228 24  0715   WBC 11.2* 9.4 9.9   HGB 12.0 12.2 15.6   HCT 37.1 37.9 45.8    147* 218      Recent Labs   Lab Test 24  0844 24  1228 24  0715    145 139   POTASSIUM 3.4 3.5 3.9   CHLORIDE 109* 109* 100   CO2 24 28 29   BUN 14.6 14.5 21.1   CR 0.69 0.83 0.81     Recent Labs   Lab Test 24  1224  07/05/24  0715 04/01/24  1128   BILITOTAL 0.8 0.8 0.5   ALT 14 22 30   AST 15 18 26   ALKPHOS 68 89 92   LIPASE  --  58  --      Recent Labs   Lab Test 07/05/24  1020   INR 1.05   PTT 28

## 2024-07-09 VITALS
OXYGEN SATURATION: 94 % | BODY MASS INDEX: 27.24 KG/M2 | RESPIRATION RATE: 18 BRPM | WEIGHT: 138.3 LBS | SYSTOLIC BLOOD PRESSURE: 143 MMHG | TEMPERATURE: 97.6 F | HEART RATE: 56 BPM | DIASTOLIC BLOOD PRESSURE: 82 MMHG

## 2024-07-09 LAB
ALBUMIN SERPL BCG-MCNC: 3.2 G/DL (ref 3.5–5.2)
ANION GAP SERPL CALCULATED.3IONS-SCNC: 5 MMOL/L (ref 7–15)
BASOPHILS # BLD AUTO: 0 10E3/UL (ref 0–0.2)
BASOPHILS NFR BLD AUTO: 0 %
BUN SERPL-MCNC: 6.8 MG/DL (ref 8–23)
CALCIUM SERPL-MCNC: 8.9 MG/DL (ref 8.8–10.2)
CHLORIDE SERPL-SCNC: 108 MMOL/L (ref 98–107)
CREAT SERPL-MCNC: 0.66 MG/DL (ref 0.51–0.95)
DEPRECATED HCO3 PLAS-SCNC: 28 MMOL/L (ref 22–29)
EGFRCR SERPLBLD CKD-EPI 2021: >90 ML/MIN/1.73M2
EOSINOPHIL # BLD AUTO: 0.1 10E3/UL (ref 0–0.7)
EOSINOPHIL NFR BLD AUTO: 1 %
ERYTHROCYTE [DISTWIDTH] IN BLOOD BY AUTOMATED COUNT: 13.7 % (ref 10–15)
GLUCOSE BLDC GLUCOMTR-MCNC: 117 MG/DL (ref 70–99)
GLUCOSE BLDC GLUCOMTR-MCNC: 125 MG/DL (ref 70–99)
GLUCOSE BLDC GLUCOMTR-MCNC: 136 MG/DL (ref 70–99)
GLUCOSE BLDC GLUCOMTR-MCNC: 151 MG/DL (ref 70–99)
GLUCOSE SERPL-MCNC: 139 MG/DL (ref 70–99)
HCT VFR BLD AUTO: 37.4 % (ref 35–47)
HGB BLD-MCNC: 12.2 G/DL (ref 11.7–15.7)
IMM GRANULOCYTES # BLD: 0 10E3/UL
IMM GRANULOCYTES NFR BLD: 1 %
LYMPHOCYTES # BLD AUTO: 1.9 10E3/UL (ref 0.8–5.3)
LYMPHOCYTES NFR BLD AUTO: 24 %
MCH RBC QN AUTO: 30.7 PG (ref 26.5–33)
MCHC RBC AUTO-ENTMCNC: 32.6 G/DL (ref 31.5–36.5)
MCV RBC AUTO: 94 FL (ref 78–100)
MONOCYTES # BLD AUTO: 1 10E3/UL (ref 0–1.3)
MONOCYTES NFR BLD AUTO: 12 %
NEUTROPHILS # BLD AUTO: 4.9 10E3/UL (ref 1.6–8.3)
NEUTROPHILS NFR BLD AUTO: 62 %
NRBC # BLD AUTO: 0 10E3/UL
NRBC BLD AUTO-RTO: 0 /100
PHOSPHATE SERPL-MCNC: 2.7 MG/DL (ref 2.5–4.5)
PHOSPHATE SERPL-MCNC: 2.8 MG/DL (ref 2.5–4.5)
PLATELET # BLD AUTO: 158 10E3/UL (ref 150–450)
POTASSIUM SERPL-SCNC: 3.9 MMOL/L (ref 3.4–5.3)
POTASSIUM SERPL-SCNC: 4.2 MMOL/L (ref 3.4–5.3)
RBC # BLD AUTO: 3.97 10E6/UL (ref 3.8–5.2)
SODIUM SERPL-SCNC: 141 MMOL/L (ref 135–145)
WBC # BLD AUTO: 8 10E3/UL (ref 4–11)

## 2024-07-09 PROCEDURE — 84100 ASSAY OF PHOSPHORUS: CPT | Performed by: INTERNAL MEDICINE

## 2024-07-09 PROCEDURE — 250N000011 HC RX IP 250 OP 636: Performed by: INTERNAL MEDICINE

## 2024-07-09 PROCEDURE — 250N000013 HC RX MED GY IP 250 OP 250 PS 637: Performed by: SURGERY

## 2024-07-09 PROCEDURE — 36415 COLL VENOUS BLD VENIPUNCTURE: CPT | Performed by: INTERNAL MEDICINE

## 2024-07-09 PROCEDURE — 99239 HOSP IP/OBS DSCHRG MGMT >30: CPT | Performed by: INTERNAL MEDICINE

## 2024-07-09 PROCEDURE — 250N000013 HC RX MED GY IP 250 OP 250 PS 637: Performed by: INTERNAL MEDICINE

## 2024-07-09 PROCEDURE — 85025 COMPLETE CBC W/AUTO DIFF WBC: CPT | Performed by: INTERNAL MEDICINE

## 2024-07-09 PROCEDURE — 99231 SBSQ HOSP IP/OBS SF/LOW 25: CPT | Performed by: PHYSICIAN ASSISTANT

## 2024-07-09 PROCEDURE — 80069 RENAL FUNCTION PANEL: CPT | Performed by: INTERNAL MEDICINE

## 2024-07-09 RX ORDER — FLUTICASONE PROPIONATE 50 MCG
1 SPRAY, SUSPENSION (ML) NASAL DAILY
Qty: 15.8 ML | Refills: 0 | Status: SHIPPED | OUTPATIENT
Start: 2024-07-10

## 2024-07-09 RX ORDER — LORATADINE 10 MG/1
10 TABLET ORAL DAILY
Qty: 10 TABLET | Refills: 0 | Status: SHIPPED | OUTPATIENT
Start: 2024-07-10 | End: 2024-07-20

## 2024-07-09 RX ADMIN — POTASSIUM & SODIUM PHOSPHATES POWDER PACK 280-160-250 MG 2 PACKET: 280-160-250 PACK at 01:10

## 2024-07-09 RX ADMIN — FLUTICASONE PROPIONATE 1 SPRAY: 50 SPRAY, METERED NASAL at 07:58

## 2024-07-09 RX ADMIN — POTASSIUM & SODIUM PHOSPHATES POWDER PACK 280-160-250 MG 2 PACKET: 280-160-250 PACK at 05:08

## 2024-07-09 RX ADMIN — Medication 1 LOZENGE: at 07:58

## 2024-07-09 RX ADMIN — ATORVASTATIN CALCIUM 20 MG: 20 TABLET, FILM COATED ORAL at 07:57

## 2024-07-09 RX ADMIN — LORATADINE 10 MG: 10 TABLET ORAL at 07:57

## 2024-07-09 RX ADMIN — PANTOPRAZOLE SODIUM 40 MG: 40 INJECTION, POWDER, FOR SOLUTION INTRAVENOUS at 07:58

## 2024-07-09 RX ADMIN — ASPIRIN 81 MG: 81 TABLET, COATED ORAL at 07:57

## 2024-07-09 ASSESSMENT — ACTIVITIES OF DAILY LIVING (ADL)
ADLS_ACUITY_SCORE: 24

## 2024-07-09 NOTE — PLAN OF CARE
Goal Outcome Evaluation:    Summary: Abd pain, N/V, SBO, colitis  DATE & TIME:07/08/24 6629-7816  Cognitive Concerns/ Orientation : A&O x4   BEHAVIOR & AGGRESSION TOOL COLOR: Green, calm, cooperative and very pleasant  ABNL VS/O2: VSS on RA,  MOBILITY: Independent .  PAIN MANAGMENT: C/O sore throat, PRN throat lozenges available.   DIET:Clear liquid. Tolerating well. No N/V  BOWEL/BLADDER: Continent,   ABNL LAB/BG: , 120. K 3.4, replaced recheck at 0143. phos 1.5, replacement in progress, recheck 0849.  DRAIN/DEVICES: PIV SL, NG discontinued.   TELEMETRY RHYTHM: NA  SKIN: WDL  TESTS/PROCEDURES:none  D/C DAY/GOALS/PLACE: Pending diet tolerance   OTHER IMPORTANT INFO: General surgery following. Plan to advance diet tomorrow and discharge. Still has pain in the throat when swallowing liquid.

## 2024-07-09 NOTE — PROGRESS NOTES
[facility]  GENERAL SURGERY Progress Note    Admission Date: 2024         Assessment and Plan:     Martha Neal is a 64 year old female with PMH of T2DM, HLD, hysterectomy who presented on 2024 with abdominal pain, nausea, and vomiting x 5 days.  CT scan was consistent with SBO with transition point. NG tube placed overnight for nausea, but some difficulty with advancement into the stomach, although once in appropriate position, had approximately 1 L of output.      24, had gastrografin challenge and had multiple stools.  AXR after with contrast in colon and no dilated loops of bowel.  NGT removed 24 and started on liquids.    -Agree with starting full liquids this am.  May advance as tolerated to low fiber diet at lunch (discussed with pt she could go home on full liquids if her throat is too sore to try solids and start solids tomorrow am at home)  -Home today likely.  -No follow up needed with surgery.    Ino Canada PA-C  [phone]             Interval History:     Pain free.  No nausea with liquids.  Passing flatus and + BMs.  Feels ready to get home today.                     Physical Exam:   Blood pressure (!) 143/82, pulse 56, temperature 97.6  F (36.4  C), temperature source Oral, resp. rate 18, weight 62.7 kg (138 lb 4.8 oz), SpO2 94%, not currently breastfeeding.  Temperature Temp  Av.1  F (36.7  C)  Min: 97.2  F (36.2  C)  Max: 98.5  F (36.9  C)   I/O last 3 completed shifts:  In: 1000 [P.O.:1000]  Out: -   Constitutional:  Awake and in no apparent distress.   Lungs: Breathing comfortably on room air   Cardiovascular: Extremities warm and well perfused   Abdomen: Soft, non-distended, non tender   Extremities: No edema. Moves all 4 extremities          Data:     Recent Labs   Lab Test 24  0950 24  0844 24  1228   WBC 13.5* 11.2* 9.4   HGB 11.8 12.0 12.2   HCT 35.7 37.1 37.9   * 150 147*      Recent Labs   Lab Test  07/09/24  0153 07/08/24  1639 07/08/24  0950 07/07/24  0844 07/06/24  1228   NA  --   --  143 143 145   POTASSIUM 4.2 3.4 3.2* 3.4 3.5   CHLORIDE  --   --  110* 109* 109*   CO2  --   --  24 24 28   BUN  --   --  14.5 14.6 14.5   CR  --   --  0.62 0.69 0.83     Recent Labs   Lab Test 07/06/24  1228 07/05/24  0715 04/01/24  1128   BILITOTAL 0.8 0.8 0.5   ALT 14 22 30   AST 15 18 26   ALKPHOS 68 89 92   LIPASE  --  58  --      Recent Labs   Lab Test 07/05/24  1020   INR 1.05   PTT 28

## 2024-07-09 NOTE — PLAN OF CARE
Goal Outcome Evaluation:         DATE & TIME:7/9/24 0700- 12 pm  Cognitive Concerns/ Orientation : A&O x4   BEHAVIOR & AGGRESSION TOOL COLOR: Green, calm, cooperative and  pleasant.  ABNL VS/O2: VSS on RA x sylvia at times  MOBILITY: Independent .  PAIN MANAGMENT: C/O sore throat, PRN throat lozenges available.   DIET:full  liquid. Tolerating well. BOWEL/BLADDER: Continent,   ABNL LAB/BG: . K 4.2 recheck next AM. phos 2.8  DRAIN/DEVICES: none  TELEMETRY RHYTHM: NA  SKIN: WDL  TESTS/PROCEDURES:none  D/C DAY/GOALS/PLACE: plan to discharge today.  OTHER IMPORTANT INFO: General surgery signed off.   Discharge    Patient discharged to Home with  Family  Care plan note;Done  Listed belongings gathered and given to patient (including from security/pharmacy). yes  Care Plan and Patient education resolved: yes  Prescriptions if needed, hard copies sent with patient  yes  Medication Bin checked and emptied on discharge yes  SW/care coordinator/charge RN aware of discharge: yes

## 2024-07-09 NOTE — PLAN OF CARE
Summary: Abd pain, N/V, SBO, colitis  DATE & TIME:07/8/24-7/9/24 7159-5330  Cognitive Concerns/ Orientation : A&O x4   BEHAVIOR & AGGRESSION TOOL COLOR: Green, calm, cooperative and very pleasant  ABNL VS/O2: VSS on RA x sylvia at times  MOBILITY: Independent .  PAIN MANAGMENT: C/O sore throat, PRN throat lozenges available.   DIET:Clear liquid. Tolerating well. No N/V  BOWEL/BLADDER: Continent,   ABNL LAB/BG: , 125. K 4.2 recheck next AM. phos 1.5, replaced, recheck 0849.  DRAIN/DEVICES: PIV SL, NG discontinued.   TELEMETRY RHYTHM: NA  SKIN: WDL  TESTS/PROCEDURES:none  D/C DAY/GOALS/PLACE: Pending diet tolerance   OTHER IMPORTANT INFO: General surgery following. Plan to advance diet today and discharge. Still has pain in the throat when swallowing liquid.

## 2024-07-09 NOTE — DISCHARGE SUMMARY
[facility]    Discharge Summary  Hospitalist    Date of Admission:  7/5/2024  Date of Discharge:  7/9/2024 12:05 PM  Discharging Provider: Willis Bejarano MD, MD  Date of Service (when I saw the patient): 07/09/24    Discharge Diagnoses   Please refer below    History of Present Illness   Martha Neal is an 64 year old female who presented with abdominal pain, nausea and vomiting    Hospital Course   Martha Neal is a 64 year old female who has a past medical history of DM II, paroxysmal supraventricular tachycardia, dyslipidemia, tobacco abuse and family history of BRCA gene mutation, among others, who was admitted on 7/5/2024 for further evaluation of abdominal pain with associated nausea and vomiting and found to have small bowel obstruction.         Plan discharge diagnoses Hospital course     Small bowel obstruction: Resolved  Abdominal pain, nausea and vomiting secondary to above: Resolved  Suspected colitis on CT: Rule out     This is a 64-year-old female with history of diabetes mellitus type 2, hyperlipidemia, history of hysterectomy appendectomy in the past, with nausea vomiting abdominal pain found to small bowel obstruction on CT scan with transition point.  At this point her abdomen is soft and benign  She is status post NG tube placement, with advancement NG tube, significant NG output  She was on IV fluids, and IV Protonix and kept her n.p.o.  Gastrografin challenge on 7/7/2024, posterior and abdominal x-ray shows contrast material within the colon post administration no dilated loops of small bowel.  NG tube removed 7/8/2024, clear liquid diet as tolerated, advance to full code  Intake and output  charting Daily weights.    Patient is doing well at this time, having bowel movements, tolerating full liquid diet well, no nausea vomiting abdominal pain at this time.  Wants to go home.  Diet is advanced to low fiber diet.  She will be discharged home in stable and improved  condition           Type 2 diabetes mellitus    PTA regimen includes:  - Hold PTA metformin. Resume at discharge.         Hypophosphatemia: Replace  *Phos 2.1 07/05.  Replaced yesterday but still low 2.3  -She is on electrolyte replacement protocol replace as per protocol     Tobacco Abuse  History of suspected undiagnosed COPD   *Current every day smoker >15 cigarettes since 13 years old (~39 pack-year history).   - Tobacco cessation advised.   - Nicotine patch available PRN.      Paroxysmal supraventricular tachycardia   Hyperlipidemia   *Zio Patch 2018 demonstrated less than 1% of the time with longest episode 15 beats. No treatment indicated at time given infrequent symptoms.   - Telemetry.   - Hold PTA statin and ASA 81 mg.  Resume aspirin and PTA statin        Vitamin D deficiency   *Vitamin D 62 04/2024.   - Continue to monitor outpatient.      History of White coat syndrome without hypertension   - Hydralazine and labetalol PRN for SBP >180.   - Continue to monitor.   Blood pressure well-controlled at this time     Postnasal drip/throat  Patient has significant postnasal drip and nasal congestion  Started on Flonase nasal spray and Claritin 10 mg daily  Overall improved at this time continue Claritin and Flonase     DVT Prophylaxis: Pneumatic Compression Devices  Code Status: No CPR- Do NOT Intubate     Willis Bejarano MD, MD    Significant Results and Procedures       Pending Results   These results will be followed up by PCP  Unresulted Labs Ordered in the Past 30 Days of this Admission       Date and Time Order Name Status Description    7/5/2024  9:37 AM Blood Culture Peripheral Blood Preliminary     7/5/2024  9:37 AM Blood Culture Peripheral Blood Preliminary             Code Status   DNR / DNI       Primary Care Physician   Amy Ruiz    Physical Exam   Temp: 97.6  F (36.4  C) Temp src: Oral BP: (!) 143/82 Pulse: 56   Resp: 18 SpO2: 94 % O2 Device: None (Room air)    Vitals:    07/06/24 0437  07/07/24 0815 07/08/24 0537   Weight: 61.3 kg (135 lb 2.3 oz) 61.6 kg (135 lb 11.2 oz) 62.7 kg (138 lb 4.8 oz)     Vital Signs with Ranges  Temp:  [97.6  F (36.4  C)-97.9  F (36.6  C)] 97.6  F (36.4  C)  Pulse:  [56-69] 56  Resp:  [16-18] 18  BP: (131-143)/(76-82) 143/82  SpO2:  [94 %-96 %] 94 %  I/O last 3 completed shifts:  In: 1000 [P.O.:1000]  Out: -     Constitutional: awake, alert, cooperative, no apparent distress, and appears stated age  Eyes: Lids and lashes normal, pupils equal, round and reactive to light, extra ocular muscles intact, sclera clear, conjunctiva normal  Respiratory: No increased work of breathing, good air exchange, clear to auscultation bilaterally, no crackles or wheezing  Cardiovascular: Normal apical impulse, regular rate and rhythm, normal S1 and S2, no S3 or S4, and no murmur noted  GI: No scars, normal bowel sounds, soft, non-distended, non-tender, no masses palpated, no hepatosplenomegally  Neurologic: No focal deficit    Discharge Disposition   Discharged to home  Condition at discharge: Stable    Consultations This Hospital Stay   SURGERY GENERAL IP CONSULT    Time Spent on this Encounter   IWillis MD, personally saw the patient today and spent greater than 30 minutes discharging this patient.    Discharge Orders      Reason for your hospital stay    SBO     Follow-up and recommended labs and tests     Follow up with primary care provider, Amy Ruiz, within 7 days for hospital follow- up.  No follow up labs or test are needed.     Activity    Your activity upon discharge: activity as tolerated     Diet    Follow this diet upon discharge: Orders Placed This Encounter     Low Fiber diet     Discharge Medications   Discharge Medication List as of 7/9/2024 11:37 AM        START taking these medications    Details   benzocaine-menthol (CHLORASEPTIC) 6-10 MG lozenge Place 1 lozenge inside cheek every hour as needed for sore throat, Disp-30 lozenge, R-0, E-Prescribe       fluticasone (FLONASE) 50 MCG/ACT nasal spray Spray 1 spray into both nostrils daily, Disp-15.8 mL, R-0, E-Prescribe      loratadine (CLARITIN) 10 MG tablet Take 1 tablet (10 mg) by mouth daily for 10 days, Disp-10 tablet, R-0, E-Prescribe           CONTINUE these medications which have NOT CHANGED    Details   aspirin (ASA) 81 MG EC tablet Take 1 tablet (81 mg) by mouth daily, Disp-90 tablet, R-3, E-Prescribe      atorvastatin (LIPITOR) 20 MG tablet Take 1 tablet (20 mg) by mouth daily, Disp-90 tablet, R-0, E-Prescribe      calcium carbonate-vitamin D (CALTRATE) 600-10 MG-MCG per tablet Take 2 tablets by mouth daily, Historical      cholecalciferol 25 MCG (1000 UT) TABS Take 1,000 Units by mouth daily, Historical      metFORMIN (GLUCOPHAGE XR) 500 MG 24 hr tablet Take 2 tablets (1,000 mg) by mouth daily (with dinner), Disp-180 tablet, R-1, E-Prescribe      vitamin C (ASCORBIC ACID) 500 MG tablet Take 500 mg by mouth daily, Historical           Allergies   No Known Allergies  Data   Most Recent 3 CBC's:  Recent Labs   Lab Test 07/09/24  0821 07/08/24  0950 07/07/24  0844   WBC 8.0 13.5* 11.2*   HGB 12.2 11.8 12.0   MCV 94 95 97    143* 150      Most Recent 3 BMP's:  Recent Labs   Lab Test 07/09/24  1146 07/09/24  0842 07/09/24  0821 07/09/24  0512 07/09/24  0153 07/08/24  1716 07/08/24  1639 07/08/24  1205 07/08/24  0950 07/07/24  1222 07/07/24  0844   NA  --   --  141  --   --   --   --   --  143  --  143   POTASSIUM  --   --  3.9  --  4.2  --  3.4  --  3.2*  --  3.4   CHLORIDE  --   --  108*  --   --   --   --   --  110*  --  109*   CO2  --   --  28  --   --   --   --   --  24  --  24   BUN  --   --  6.8*  --   --   --   --   --  14.5  --  14.6   CR  --   --  0.66  --   --   --   --   --  0.62  --  0.69   ANIONGAP  --   --  5*  --   --   --   --   --  9  --  10   ARLENE  --   --  8.9  --   --   --   --   --  8.6*  --  8.8   * 151* 139*   < >  --    < >  --    < > 192*   < > 119*    < > = values in this  interval not displayed.     Most Recent 2 LFT's:  Recent Labs   Lab Test 07/06/24  1228 07/05/24  0715   AST 15 18   ALT 14 22   ALKPHOS 68 89   BILITOTAL 0.8 0.8     Most Recent INR's and Anticoagulation Dosing History:  Anticoagulation Dose History          Latest Ref Rng & Units 7/5/2024   Recent Dosing and Labs   INR 0.85 - 1.15 1.05       Details                 Most Recent 3 Troponin's:No lab results found.  Most Recent Cholesterol Panel:  Recent Labs   Lab Test 04/01/24  1128   CHOL 128   LDL 43   HDL 74   TRIG 54     Most Recent 6 Bacteria Isolates From Any Culture (See EPIC Reports for Culture Details):  Recent Labs   Lab Test 06/04/17  1011 08/18/16  0949   CULT >100,000 colonies/mL mixed urogenital patricia 50,000 to 100,000 colonies/mL mixed urogenital patricia Susceptibility testing not   routinely done       Most Recent TSH, T4 and A1c Labs:  Recent Labs   Lab Test 04/01/24  1128 12/05/19  0857 08/16/18  0852   TSH  --   --  2.78   A1C 7.1*   < > 6.5*    < > = values in this interval not displayed.     Results for orders placed or performed during the hospital encounter of 07/05/24   CT Abdomen Pelvis w Contrast    Narrative    CT ABDOMEN PELVIS W CONTRAST 7/5/2024 8:19 AM    CLINICAL HISTORY: Nausea vomiting and abdominal pain for 5 days.   History of appendectomy and hysterectomy    TECHNIQUE: CT scan of the abdomen and pelvis was performed following  injection of IV contrast. Multiplanar reformats were obtained. Dose  reduction techniques were used.  CONTRAST: 68mL Isovue-370    COMPARISON: None.    FINDINGS:   LOWER CHEST: Unremarkable.    HEPATOBILIARY: Small cyst is noted along the medial left hepatic lobe.  The gallbladder appears contracted.    PANCREAS: No significant mass, duct dilatation, or inflammatory  change.    SPLEEN: Normal size.    ADRENAL GLANDS: Mild thickening is seen along the adrenal glands  suggestive of hyperplasia.    KIDNEYS/BLADDER: No significant mass, stones, or  hydronephrosis.    BOWEL: Multiple dilated loops of small bowel are seen throughout the  abdomen measuring up to 4.0 cm in diameter along the left upper  quadrant (series 3, image 33). Transition point is seen in the central  abdomen (series 5, image 21). Mild wall thickening is noted along the  sigmoid and descending colon which appear contracted. There is also  small volume ascites is noted along the left paracolic gutter.    PELVIC ORGANS: Uterus is surgically absent. Small volume ascites seen  in the pelvis.    ADDITIONAL FINDINGS: Scattered vascular calcifications are seen in the  abdominal aorta and iliac branches.    MUSCULOSKELETAL: Multilevel degenerative changes are seen in the  spine.      Impression    IMPRESSION:   1.  Multiple dilated loops of small bowel seen throughout the abdomen  with transition point seen along the central abdomen. Findings are  compatible with small bowel obstruction.  2.  Mild wall thickening seen along the sigmoid and descending colon  with small volume free fluid noted along the left paracolic gutter.  Findings are suggestive of colitis.  3.  Small volume free fluid is seen in the pelvis.    KWABENA ARANGO MD         SYSTEM ID:  [id number]   XR Abdomen Port 1 View    Narrative    EXAM: XR ABDOMEN PORT 1 VIEW  LOCATION: [facility]  DATE: 7/5/2024    INDICATION: Verify NG tube placement  COMPARISON: CT 7/5/2024 at 0807 hours      Impression    IMPRESSION: An NG tube sidehole is in the distal esophagus. Recommend advancing at least 6 cm. No distended air-filled loops of small bowel.   XR Abdomen 1 View    Narrative    EXAM: XR ABDOMEN 1 VIEW  LOCATION: [facility]  DATE: 7/6/2024    INDICATION: Confirm NG placement  COMPARISON: July 5, 2024, 1844 hours      Impression    IMPRESSION: Nasogastric tube has its proximal port about 15 mm above the level of the left hemidiaphragm. Consider advancing the tube at least 4 cm.    The  stomach is decompressed. There is a single loop of mildly distended air-filled small bowel in the left abdomen, 32 mm. The bowel gas pattern otherwise appears normal. Visualized lung bases are clear.   XR Abdomen 1 View    Narrative    EXAM: XR ABDOMEN 1 VIEW  LOCATION: [facility]  DATE: 7/7/2024    INDICATION: NG tube placement  COMPARISON: 07/06/2024.      Impression    IMPRESSION: Interval advancement of the gastric suction tube with distal tip projecting at the gastric body and proximal side port at the gastric fundus. Bowel gas pattern is incompletely imaged but appears nonobstructive.   XR Gastrografin Challenge    Narrative    EXAM: XR GASTROGRAFIN CHALLENGE  LOCATION: [facility]  DATE: 7/8/2024    INDICATION: Small Bowel Obstruction  COMPARISON: 7/6/2024  TECHNIQUE: Routine water soluble contrast follow-through challenge.      Impression    IMPRESSION:  1.  Water soluble contrast material IS identified within the colon post administration.  2.  No dilated loops of small bowel.     Most Recent 3 CBC's:  Recent Labs   Lab Test 07/09/24  0821 07/08/24  0950 07/07/24  0844   WBC 8.0 13.5* 11.2*   HGB 12.2 11.8 12.0   MCV 94 95 97    143* 150     Most Recent 3 BMP's:  Recent Labs   Lab Test 07/09/24  1146 07/09/24  0842 07/09/24  0821 07/09/24  0512 07/09/24  0153 07/08/24  1716 07/08/24  1639 07/08/24  1205 07/08/24  0950 07/07/24  1222 07/07/24  0844   NA  --   --  141  --   --   --   --   --  143  --  143   POTASSIUM  --   --  3.9  --  4.2  --  3.4  --  3.2*  --  3.4   CHLORIDE  --   --  108*  --   --   --   --   --  110*  --  109*   CO2  --   --  28  --   --   --   --   --  24  --  24   BUN  --   --  6.8*  --   --   --   --   --  14.5  --  14.6   CR  --   --  0.66  --   --   --   --   --  0.62  --  0.69   ANIONGAP  --   --  5*  --   --   --   --   --  9  --  10   ARLENE  --   --  8.9  --   --   --   --   --  8.6*  --  8.8   * 151* 139*   < >  --     < >  --    < > 192*   < > 119*    < > = values in this interval not displayed.     Most Recent 2 LFT's:  Recent Labs   Lab Test 07/06/24  1228 07/05/24  0715   AST 15 18   ALT 14 22   ALKPHOS 68 89   BILITOTAL 0.8 0.8

## 2024-07-10 ENCOUNTER — PATIENT OUTREACH (OUTPATIENT)
Dept: INTERNAL MEDICINE | Facility: CLINIC | Age: 64
End: 2024-07-10
Payer: COMMERCIAL

## 2024-07-10 LAB
BACTERIA BLD CULT: NO GROWTH
BACTERIA BLD CULT: NO GROWTH

## 2024-07-10 NOTE — TELEPHONE ENCOUNTER
"  Transitions of Care Outreach  Chief Complaint   Patient presents with    Hospital F/U     SBO, PND       Most Recent Admission Date: 7/5/2024   Most Recent Admission Diagnosis: Colitis - K52.9  Small bowel obstruction (H) - K56.609     Most Recent Discharge Date: 7/9/2024   Most Recent Discharge Diagnosis: Small bowel obstruction (H) - K56.609  Colitis - K52.9  PND (post-nasal drip) - R09.82     Transitions of Care Assessment    Discharge Assessment  How are you doing now that you are home?: \"Doing much better, tired and throat is a little sore from the tube. I am eating more solid foods.\"  How are your symptoms? (Red Flag symptoms escalate to triage hotline per guidelines): Improved  Do you know how to contact your clinic care team if you have future questions or changes to your health status? : Yes  Does the patient have their discharge instructions? : Yes  Does the patient have questions regarding their discharge instructions? : No  Were you started on any new medications or were there changes to any of your previous medications? : Yes  Does the patient have all of their medications?: Yes  Do you have questions regarding any of your medications? : No  Do you have all of your needed medical supplies or equipment (DME)?  (i.e. oxygen tank, CPAP, cane, etc.): Yes    Follow up Plan     Discharge Follow-Up  Discharge follow up appointment scheduled in alignment with recommended follow up timeframe or Transitions of Risk Category? (Low = within 30 days; Moderate= within 14 days; High= within 7 days): Yes  Discharge Follow Up Appointment Date: 07/15/24  Discharge Follow Up Appointment Scheduled with?: Primary Care Provider    Future Appointments   Date Time Provider Department Center   7/15/2024  1:30 PM Amy Ruiz MD [facility]       Outpatient Plan as outlined on AVS reviewed with patient.    For any urgent concerns, please contact our 24 hour nurse triage line: [phone] ([phone])       Vanda CRAWFORD" DANITA Anaya

## 2024-07-14 PROBLEM — K56.609 SMALL BOWEL OBSTRUCTION (H): Status: RESOLVED | Noted: 2024-07-05 | Resolved: 2024-07-14

## 2024-07-14 PROBLEM — K52.9 COLITIS: Status: RESOLVED | Noted: 2024-07-05 | Resolved: 2024-07-14

## 2024-07-15 ENCOUNTER — OFFICE VISIT (OUTPATIENT)
Dept: INTERNAL MEDICINE | Facility: CLINIC | Age: 64
End: 2024-07-15
Payer: COMMERCIAL

## 2024-07-15 VITALS
BODY MASS INDEX: 26.05 KG/M2 | SYSTOLIC BLOOD PRESSURE: 159 MMHG | WEIGHT: 132.3 LBS | OXYGEN SATURATION: 99 % | TEMPERATURE: 98.1 F | HEART RATE: 68 BPM | RESPIRATION RATE: 20 BRPM | DIASTOLIC BLOOD PRESSURE: 89 MMHG

## 2024-07-15 DIAGNOSIS — K56.609 SBO (SMALL BOWEL OBSTRUCTION) (H): ICD-10-CM

## 2024-07-15 DIAGNOSIS — R19.5 POSITIVE COLORECTAL CANCER SCREENING USING COLOGUARD TEST: ICD-10-CM

## 2024-07-15 DIAGNOSIS — Z09 HOSPITAL DISCHARGE FOLLOW-UP: Primary | ICD-10-CM

## 2024-07-15 PROCEDURE — 99495 TRANSJ CARE MGMT MOD F2F 14D: CPT | Performed by: INTERNAL MEDICINE

## 2024-07-15 ASSESSMENT — PAIN SCALES - GENERAL: PAINLEVEL: NO PAIN (0)

## 2024-07-15 NOTE — PROGRESS NOTES
ASSESSMENT/PLAN      (Z09) Hospital discharge follow-up  (primary encounter diagnosis)  (K56.609) SBO (small bowel obstruction) (H)  Comment: clinically doing well; no recurrent symptoms.  Plan: if recurrent symptoms, patient to contact MD.    (R19.5) Positive colorectal cancer screening using Cologuard test  Comment: patient declines colonoscopy at this time.    Amy Ruiz MD   [place] [address] [address] [address]  T: [phone], F: [phone]    SUBJECTIVE     Martha Neal is a very pleasant 64 year old female who presents for hospital follow-up:    Hospital: [facility]  Date of Admission: 7/5/2024  Date of Discharge: 7/9/2024  Reason(s) for Admission: small bowel obstruction    Diagnostic tests, treatments/interventions, and discharge summary reviewed.    Summary of hospitalization:  - presented with nausea, vomiting, and abdominal pain  - CT scan demonstrated a small bowel obstruction  - admitted, NG tube placed, and started on IV fluids and IV PPI  - gastrografin challenge 7/7/2024 followed by NG tube removal and diet advancement    Medication changes since discharge: none  Adherent to discharge medications: yes  Problems taking discharge medications: no    Follow-up: n/a     Update since discharge:   - doing well-no concerns or complaints  - no recurrent abdominal pain, nausea, or vomiting  - tolerating her normal diet  - having daily soft, formed stools    Unrelated to above, patient has a history of a positive Cologuard test in 2023.  Colonoscopy recommended but declined by patient.    OBJECTIVE      BP (!) 159/89   Pulse 68   Temp 98.1  F (36.7  C)   Resp 20   Wt 60 kg (132 lb 4.8 oz)   LMP  (LMP Unknown)   SpO2 99%   BMI 26.05 kg/m    Constitutional: well-appearing  Respiratory: normal respiratory effort; clear to auscultation bilaterally  Cardiovascular: regular rate and rhythm; no edema  Gastrointestinal: soft, non-tender, and non-distended;  no organomegaly or masses   Musculoskeletal: normal gait and station  Psych: normal judgment and insight; normal mood and affect; recent and remote memory intact  ---  (Note was completed, in part, with [employer] voice-recognition software. Documentation was reviewed, but some grammatical, spelling, and word errors may remain.)

## 2024-08-14 DIAGNOSIS — E11.9 TYPE 2 DIABETES MELLITUS WITHOUT COMPLICATION, WITHOUT LONG-TERM CURRENT USE OF INSULIN (H): ICD-10-CM

## 2024-08-14 RX ORDER — ATORVASTATIN CALCIUM 20 MG/1
20 TABLET, FILM COATED ORAL DAILY
Qty: 90 TABLET | Refills: 1 | Status: SHIPPED | OUTPATIENT
Start: 2024-08-14

## 2024-11-09 ENCOUNTER — HEALTH MAINTENANCE LETTER (OUTPATIENT)
Age: 64
End: 2024-11-09

## 2024-11-17 DIAGNOSIS — E11.9 TYPE 2 DIABETES MELLITUS WITHOUT COMPLICATION, WITHOUT LONG-TERM CURRENT USE OF INSULIN (H): ICD-10-CM

## 2024-11-18 RX ORDER — METFORMIN HYDROCHLORIDE 500 MG/1
1000 TABLET, EXTENDED RELEASE ORAL
Qty: 180 TABLET | Refills: 1 | Status: SHIPPED | OUTPATIENT
Start: 2024-11-18

## 2025-03-03 ENCOUNTER — PATIENT OUTREACH (OUTPATIENT)
Dept: CARE COORDINATION | Facility: CLINIC | Age: 65
End: 2025-03-03
Payer: COMMERCIAL

## 2025-03-04 ENCOUNTER — OFFICE VISIT (OUTPATIENT)
Dept: INTERNAL MEDICINE | Facility: CLINIC | Age: 65
End: 2025-03-04
Payer: COMMERCIAL

## 2025-03-04 VITALS
TEMPERATURE: 97.1 F | DIASTOLIC BLOOD PRESSURE: 88 MMHG | HEIGHT: 60 IN | BODY MASS INDEX: 25.25 KG/M2 | HEART RATE: 89 BPM | OXYGEN SATURATION: 100 % | WEIGHT: 128.6 LBS | SYSTOLIC BLOOD PRESSURE: 142 MMHG

## 2025-03-04 DIAGNOSIS — E11.9 TYPE 2 DIABETES MELLITUS WITHOUT COMPLICATION, WITHOUT LONG-TERM CURRENT USE OF INSULIN (H): Primary | ICD-10-CM

## 2025-03-04 DIAGNOSIS — Z12.11 SPECIAL SCREENING FOR MALIGNANT NEOPLASMS, COLON: ICD-10-CM

## 2025-03-04 DIAGNOSIS — L98.9 SKIN LESION: ICD-10-CM

## 2025-03-04 LAB
ALBUMIN SERPL BCG-MCNC: 4.3 G/DL (ref 3.5–5.2)
ALP SERPL-CCNC: 85 U/L (ref 40–150)
ALT SERPL W P-5'-P-CCNC: 18 U/L (ref 0–50)
ANION GAP SERPL CALCULATED.3IONS-SCNC: 9 MMOL/L (ref 7–15)
AST SERPL W P-5'-P-CCNC: 22 U/L (ref 0–45)
BILIRUB SERPL-MCNC: 0.4 MG/DL
BUN SERPL-MCNC: 20.8 MG/DL (ref 8–23)
CALCIUM SERPL-MCNC: 11.2 MG/DL (ref 8.8–10.4)
CHLORIDE SERPL-SCNC: 105 MMOL/L (ref 98–107)
CHOLEST SERPL-MCNC: 133 MG/DL
CREAT SERPL-MCNC: 0.74 MG/DL (ref 0.51–0.95)
CREAT UR-MCNC: 23.2 MG/DL
EGFRCR SERPLBLD CKD-EPI 2021: 89 ML/MIN/1.73M2
ERYTHROCYTE [DISTWIDTH] IN BLOOD BY AUTOMATED COUNT: 14 % (ref 10–15)
EST. AVERAGE GLUCOSE BLD GHB EST-MCNC: 157 MG/DL
FASTING STATUS PATIENT QL REPORTED: NO
FASTING STATUS PATIENT QL REPORTED: NO
GLUCOSE SERPL-MCNC: 127 MG/DL (ref 70–99)
HBA1C MFR BLD: 7.1 % (ref 0–5.6)
HCO3 SERPL-SCNC: 28 MMOL/L (ref 22–29)
HCT VFR BLD AUTO: 42.6 % (ref 35–47)
HDLC SERPL-MCNC: 77 MG/DL
HGB BLD-MCNC: 14.2 G/DL (ref 11.7–15.7)
LDLC SERPL CALC-MCNC: 37 MG/DL
MCH RBC QN AUTO: 31.1 PG (ref 26.5–33)
MCHC RBC AUTO-ENTMCNC: 33.3 G/DL (ref 31.5–36.5)
MCV RBC AUTO: 93 FL (ref 78–100)
MICROALBUMIN UR-MCNC: 15.1 MG/L
MICROALBUMIN/CREAT UR: 65.09 MG/G CR (ref 0–25)
NONHDLC SERPL-MCNC: 56 MG/DL
PLATELET # BLD AUTO: 209 10E3/UL (ref 150–450)
POTASSIUM SERPL-SCNC: 4.7 MMOL/L (ref 3.4–5.3)
PROT SERPL-MCNC: 7.1 G/DL (ref 6.4–8.3)
RBC # BLD AUTO: 4.56 10E6/UL (ref 3.8–5.2)
SODIUM SERPL-SCNC: 142 MMOL/L (ref 135–145)
TRIGL SERPL-MCNC: 95 MG/DL
WBC # BLD AUTO: 6.9 10E3/UL (ref 4–11)

## 2025-03-04 PROCEDURE — 85027 COMPLETE CBC AUTOMATED: CPT | Performed by: INTERNAL MEDICINE

## 2025-03-04 PROCEDURE — 80061 LIPID PANEL: CPT | Performed by: INTERNAL MEDICINE

## 2025-03-04 PROCEDURE — 3077F SYST BP >= 140 MM HG: CPT | Performed by: INTERNAL MEDICINE

## 2025-03-04 PROCEDURE — 3079F DIAST BP 80-89 MM HG: CPT | Performed by: INTERNAL MEDICINE

## 2025-03-04 PROCEDURE — 82570 ASSAY OF URINE CREATININE: CPT | Performed by: INTERNAL MEDICINE

## 2025-03-04 PROCEDURE — 83036 HEMOGLOBIN GLYCOSYLATED A1C: CPT | Performed by: INTERNAL MEDICINE

## 2025-03-04 PROCEDURE — 82043 UR ALBUMIN QUANTITATIVE: CPT | Performed by: INTERNAL MEDICINE

## 2025-03-04 PROCEDURE — 99214 OFFICE O/P EST MOD 30 MIN: CPT | Performed by: INTERNAL MEDICINE

## 2025-03-04 PROCEDURE — G2211 COMPLEX E/M VISIT ADD ON: HCPCS | Performed by: INTERNAL MEDICINE

## 2025-03-04 PROCEDURE — 36415 COLL VENOUS BLD VENIPUNCTURE: CPT | Performed by: INTERNAL MEDICINE

## 2025-03-04 PROCEDURE — 99207 PR FOOT EXAM NO CHARGE: CPT | Performed by: INTERNAL MEDICINE

## 2025-03-04 PROCEDURE — 80053 COMPREHEN METABOLIC PANEL: CPT | Performed by: INTERNAL MEDICINE

## 2025-03-04 NOTE — PROGRESS NOTES
ASSESSMENT/PLAN                                                      (E11.9) Type 2 diabetes mellitus without complication, without long-term current use of insulin (H)  (primary encounter diagnosis)  Plan: labs today; recommendations to follow; patient encouraged to schedule her annual diabetic eye exam.    (Z12.11) Special screening for malignant neoplasms, colon  Comment: see discussion below.   Plan: FIT test ordered - patient to pick-up, complete, and mail in when able.     (L98.9) Skin lesion  Plan: Adult Dermatology  Referral placed - patient will be contacted to schedule.      The longitudinal plan of care for the diagnosis(es)/condition(s) as documented were addressed during this visit. Due to the added complexity in care, I will continue to support Martha in the subsequent management and with ongoing continuity of care.     Amy Ruiz MD   [address] [address]  T: [phone], F: [phone]    SUBJECTIVE                                                      Martha Neal is a very pleasant 64 year old female who presents for diabetes follow-up:    Patient's current diabetes regimen is Metformin XR 1000mg daily. She is adherent to her diabetic regimen and is not at risk of hypoglycemia with current medications. She is adherent to her diabetic diet. No diabetic complications. Annual diabetic eye exam is NOT up to date. She is checking her feet regularly. Her pertinent vaccinations (Pneumovax, influenza) are NOT up to date. Declines flu shots. Pneurmonia vaccines are up to date. She is on a daily baby aspirin and a statin. She is NOT on an ACE-I or ARB. Blood pressure is well-controlled at home (always elevated in office).     Unrelated to above, patient is due for colon cancer screening, specifically a colonoscopy due to a positive Cologuard test in 2023. Patient refuses colonoscopy but is willing to perform a stool kit for screening.    Also,  "unrelated to above, patient reports a skin lesion left lower chin. Skin lesion has been present for month and partially improved with OTC clotrimazole.     OBJECTIVE                                                      BP (!) 142/88   Pulse 89   Temp 97.1  F (36.2  C) (Temporal)   Ht 1.518 m (4' 11.75\")   Wt 58.3 kg (128 lb 9.6 oz)   LMP  (LMP Unknown)   SpO2 100%   BMI 25.33 kg/m    Constitutional: well-appearing  Respiratory: normal respiratory effort; clear to auscultation bilaterally  Cardiovascular: regular rate and rhythm; no edema  Gastrointestinal: soft, non-tender, and non-distended; no organomegaly or masses  Musculoskeletal: normal gait and station  Foot exam: feet intact - no lesions or ulcers; sensation intact to monofilament   Psych: normal judgment and insight; normal mood and affect; recent and remote memory intact  Integumentary: pink papule with central scab left chin; hypopigmented, caved-in macule noted next to this    ---    (Note documentation was completed, in part, with [employer] voice-recognition software. Documentation was reviewed, but some grammatical, spelling, and word errors may remain.)    "

## 2025-03-05 DIAGNOSIS — E11.9 TYPE 2 DIABETES MELLITUS WITHOUT COMPLICATION, WITHOUT LONG-TERM CURRENT USE OF INSULIN (H): Primary | ICD-10-CM

## 2025-03-05 RX ORDER — ATORVASTATIN CALCIUM 10 MG/1
10 TABLET, FILM COATED ORAL DAILY
Qty: 90 TABLET | Refills: 3 | Status: SHIPPED | OUTPATIENT
Start: 2025-03-05

## 2025-03-11 LAB — HEMOCCULT STL QL IA: NEGATIVE

## 2025-03-17 ENCOUNTER — PATIENT OUTREACH (OUTPATIENT)
Dept: CARE COORDINATION | Facility: CLINIC | Age: 65
End: 2025-03-17
Payer: COMMERCIAL

## 2025-04-09 ENCOUNTER — PATIENT OUTREACH (OUTPATIENT)
Dept: CARE COORDINATION | Facility: CLINIC | Age: 65
End: 2025-04-09
Payer: COMMERCIAL

## 2025-04-11 ENCOUNTER — TRANSFERRED RECORDS (OUTPATIENT)
Dept: HEALTH INFORMATION MANAGEMENT | Facility: CLINIC | Age: 65
End: 2025-04-11
Payer: COMMERCIAL

## 2025-04-11 LAB — RETINOPATHY: NEGATIVE

## 2025-04-14 ENCOUNTER — MYC MEDICAL ADVICE (OUTPATIENT)
Dept: INTERNAL MEDICINE | Facility: CLINIC | Age: 65
End: 2025-04-14
Payer: COMMERCIAL

## 2025-04-17 ENCOUNTER — PATIENT OUTREACH (OUTPATIENT)
Dept: CARE COORDINATION | Facility: CLINIC | Age: 65
End: 2025-04-17
Payer: COMMERCIAL

## 2025-04-21 ENCOUNTER — OFFICE VISIT (OUTPATIENT)
Dept: DERMATOLOGY | Facility: CLINIC | Age: 65
End: 2025-04-21
Attending: INTERNAL MEDICINE
Payer: COMMERCIAL

## 2025-04-21 DIAGNOSIS — L98.9 SKIN LESION: ICD-10-CM

## 2025-04-21 DIAGNOSIS — L57.8 ACTINIC SKIN DAMAGE: ICD-10-CM

## 2025-04-21 DIAGNOSIS — D48.5 NEOPLASM OF UNCERTAIN BEHAVIOR OF SKIN: Primary | ICD-10-CM

## 2025-04-21 DIAGNOSIS — L81.4 LENTIGINES: ICD-10-CM

## 2025-04-21 PROCEDURE — 88305 TISSUE EXAM BY PATHOLOGIST: CPT | Performed by: PATHOLOGY

## 2025-04-21 NOTE — PROGRESS NOTES
[facility] Dermatology Note    Encounter Date: Apr 21, 2025    Dermatology Problem List:    ______________________________________    Impression/Plan:  Martha was seen today for derm problem.    Diagnoses and all orders for this visit:    Neoplasm of uncertain behavior of skin  -     SHAVE 1 [[id number]]  -     Dermatological Path Order and Indications; Standing  -     Dermatological Path Order and Indications  - see procedure note  - nBCC adjacent to white depressed scar like area, denies prior bx. ?iBCC component. Both areas sampled in 1 bx       Skin lesion  -     Adult Dermatology  Referral    Actinic skin damage  Lentigines  - Reviewed the compounding benefits of incremental changes to sun protective behaviors including increased frequency of sunscreen and sun protective clothing like broad brimmed hats and longsleeved UPF containing clothing        - SHAVE BIOPSY PROCEDURE NOTE: After written informed consent was obtained, a time out was taken to identify the patient and the correct site for biopsy. The lesion on the L chin was cleansed with a 70% isopropyl alcohol wipe, and then injected with 2 cc of lidocaine 1% with epinephrine 1:100,000. Once anesthesia was ensured, the visible surface of the lesion was biopsied using a Nottingham blade in standard technique. Hemostasis was obtained with pressure and aluminum chloride 20% solution. The specimen was placed in a labeled formalin container and sent to pathology for sectioning and analysis. The wound was dressed with white petrolatum and an adhesive bandage. The patient tolerated the procedure well. Post-procedure instructions and recommendations were provided both verbally and in writing.    Follow-up after results.       Staff Involved:  Staff Only    Cuauhtemoc Hussein MD   of Dermatology  Department of Dermatology  [facility]      CC:   Chief Complaint   Patient presents with    Derm  Problem     Spot-on chin-present for about 2 years-has been treated with clotrimazole which has not been helpful  2. Lesion-right ear-present for 2-3 months-asymptomatic       History of Present Illness:  Ms. Martha Neal is a 64 year old female who presents as a new patient.    Spot on chin, ongoing for 2 years, getting larger    Labs:      Physical exam:  Vitals: LMP  (LMP Unknown)   GEN: well developed, well-nourished, in no acute distress, in a pleasant mood.     SKIN: Velásquez phototype 1  - Focused examination of the chin was performed.  - pearly telangiectatic papule L chin  - No other lesions of concern on areas examined.     Past Medical History:   Past Medical History:   Diagnosis Date    Osteopenia     Type 2 diabetes mellitus (H)     White coat syndrome without hypertension      Past Surgical History:   Procedure Laterality Date    APPENDECTOMY      incidental during hysterectomy    HYSTERECTOMY, PAP NO LONGER INDICATED      fibroids + ovarian cysts; TAHBSO       Social History:   reports that she has been smoking cigarettes. She has a 47 pack-year smoking history. She has never used smokeless tobacco. She reports that she does not drink alcohol and does not use drugs.    Family History:  Family History   Problem Relation Age of Onset    Coronary Artery Disease Father         MI and CABG in his 60s    Cerebrovascular Disease Father         60s/70s    Hyperlipidemia Father     Breast Cancer Father     Diabetes No family hx of     Coronary Artery Disease Early Onset No family hx of     Ovarian Cancer No family hx of     Colon Cancer No family hx of        Medications:  Current Outpatient Medications   Medication Sig Dispense Refill    aspirin (ASA) 81 MG EC tablet Take 1 tablet (81 mg) by mouth daily 90 tablet 3    atorvastatin (LIPITOR) 10 MG tablet Take 1 tablet (10 mg) by mouth daily. 90 tablet 3    calcium carbonate-vitamin D (CALTRATE) 600-10 MG-MCG per tablet Take 2 tablets by mouth daily       cholecalciferol 25 MCG (1000 UT) TABS Take 1,000 Units by mouth daily      losartan (COZAAR) 25 MG tablet Take 1 tablet (25 mg) by mouth daily. 90 tablet 3    metFORMIN (GLUCOPHAGE XR) 500 MG 24 hr tablet TAKE 2 TABLETS BY MOUTH DAILY WITH DINNER 180 tablet 1    vitamin C (ASCORBIC ACID) 500 MG tablet Take 500 mg by mouth daily       No Known Allergies

## 2025-04-21 NOTE — PATIENT INSTRUCTIONS
Wound Care After a Biopsy    What is a skin biopsy?  A skin biopsy allows the doctor to examine a very small piece of tissue under the microscope to determine the diagnosis and the best treatment for the skin condition. A local anesthetic (numbing medicine) is injected with a very small needle into the skin area to be tested. A small piece of skin is taken from the area. Sometimes a suture (stitch) is used.     What are the risks of a skin biopsy?  I will experience scar, bleeding, swelling, pain, crusting and redness. I may experience incomplete removal or recurrence. Risks of this procedure are excessive bleeding, bruising, infection, nerve damage, numbness, thick (hypertrophic or keloidal) scar and non-diagnostic biopsy.    How should I care for my wound for the first 24 hours?  Keep the wound dry and covered for 24 hours  If it bleeds, hold direct pressure on the area for 15 minutes. If bleeding does not stop, call us or go to the emergency room  Avoid strenuous exercise the first 1-2 days or as your doctor instructs you    How should I care for the wound after 24 hours?  After 24 hours, remove the bandage  You may bathe or shower as normal  If you had a scalp biopsy, you can shampoo as usual and can use shower water to clean the biopsy site daily  Clean the wound once a day with gentle soap and water  Do not scrub, be gentle  Apply white petroleum/Vaseline after cleaning the wound with a cotton swab or a clean finger, and keep the site covered with a Bandaid /bandage. Bandages are not necessary with a scalp biopsy  If you are unable to cover the site with a Bandaid /bandage, re-apply ointment 2-3 times a day to keep the site moist. Moisture will help with healing  Avoid strenuous activity for first 1-2 days  Avoid lakes, rivers, pools, and oceans until the stitches are removed or the site is healed    How do I clean my wound?  Wash hands thoroughly with soap or use hand  before all wound care  Clean  the wound with gentle soap and water  Apply white petroleum/Vaseline  to wound after it is clean  Replace the Bandaid /bandage to keep the wound covered for the first few days or as instructed by your doctor  If you had a scalp biopsy, warm shower water to the area on a daily basis should suffice    What should I use to clean my wound?   Cotton-tipped applicators (Qtips )  White petroleum jelly (Vaseline ). Use a clean new container and use Q-tips to apply.  Bandaids  as needed  Gentle soap     How should I care for my wound long term?  Do not get your wound dirty  Keep up with wound care for one week or until the area is healed.  A small scab will form and fall off by itself when the area is completely healed. The area will be red and will become pink in color as it heals. Sun protection is very important for how your scar will turn out. Sunscreen with an SPF 30 or greater is recommended once the area is healed.  You should have some soreness but it should be mild and slowly go away over several days. Talk to your doctor about using tylenol for pain,    When should I call my doctor?  If you have increased:   Pain or swelling  Pus or drainage (clear or slightly yellow drainage is ok)  Temperature over 100F  Spreading redness or warmth around wound    When will I hear about my results?  The biopsy results can take 2 weeks to come back.  Your results will automatically release to [employer] before your provider has even reviewed them.  The clinic will call you with the results, send you a [employer] message, or have you schedule a follow-up clinic or phone time to discuss the results.  Contact our clinics if you do not hear from us in 2 weeks.    Who should I call with questions?  [facility]: [phone]  [facility]: [phone]  For urgent needs outside of business hours call the [facility] at [phone] and ask for the dermatology resident on call

## 2025-04-21 NOTE — LETTER
4/21/2025      Martha Neal  [address]  [place] [address]      Dear Colleague,    Thank you for referring your patient, Martha Neal, to the [facility]. Please see a copy of my visit note below.    [facility] Dermatology Note    Encounter Date: Apr 21, 2025    Dermatology Problem List:    ______________________________________    Impression/Plan:  Martha was seen today for derm problem.    Diagnoses and all orders for this visit:    Neoplasm of uncertain behavior of skin  -     SHAVE 1 [[id number]]  -     Dermatological Path Order and Indications; Standing  -     Dermatological Path Order and Indications  - see procedure note  - nBCC adjacent to white depressed scar like area, denies prior bx. ?iBCC component. Both areas sampled in 1 bx       Skin lesion  -     Adult Dermatology  Referral    Actinic skin damage  Lentigines  - Reviewed the compounding benefits of incremental changes to sun protective behaviors including increased frequency of sunscreen and sun protective clothing like broad brimmed hats and longsleeved UPF containing clothing        - SHAVE BIOPSY PROCEDURE NOTE: After written informed consent was obtained, a time out was taken to identify the patient and the correct site for biopsy. The lesion on the L chin was cleansed with a 70% isopropyl alcohol wipe, and then injected with 2 cc of lidocaine 1% with epinephrine 1:100,000. Once anesthesia was ensured, the visible surface of the lesion was biopsied using a Newell blade in standard technique. Hemostasis was obtained with pressure and aluminum chloride 20% solution. The specimen was placed in a labeled formalin container and sent to pathology for sectioning and analysis. The wound was dressed with white petrolatum and an adhesive bandage. The patient tolerated the procedure well. Post-procedure instructions and recommendations were provided both verbally and in  writing.    Follow-up after results.       Staff Involved:  Staff Only    Cuauhtemoc Hussein MD   of Dermatology  Department of Dermatology  [facility]      CC:   Chief Complaint   Patient presents with     Derm Problem     Spot-on chin-present for about 2 years-has been treated with clotrimazole which has not been helpful  2. Lesion-right ear-present for 2-3 months-asymptomatic       History of Present Illness:  Ms. Martha Neal is a 64 year old female who presents as a new patient.    Spot on chin, ongoing for 2 years, getting larger    Labs:      Physical exam:  Vitals: LMP  (LMP Unknown)   GEN: well developed, well-nourished, in no acute distress, in a pleasant mood.     SKIN: Velásquez phototype 1  - Focused examination of the chin was performed.  - pearly telangiectatic papule L chin  - No other lesions of concern on areas examined.     Past Medical History:   Past Medical History:   Diagnosis Date     Osteopenia      Type 2 diabetes mellitus (H)      White coat syndrome without hypertension      Past Surgical History:   Procedure Laterality Date     APPENDECTOMY      incidental during hysterectomy     HYSTERECTOMY, PAP NO LONGER INDICATED      fibroids + ovarian cysts; TAHBSO       Social History:   reports that she has been smoking cigarettes. She has a 47 pack-year smoking history. She has never used smokeless tobacco. She reports that she does not drink alcohol and does not use drugs.    Family History:  Family History   Problem Relation Age of Onset     Coronary Artery Disease Father         MI and CABG in his 60s     Cerebrovascular Disease Father         60s/70s     Hyperlipidemia Father      Breast Cancer Father      Diabetes No family hx of      Coronary Artery Disease Early Onset No family hx of      Ovarian Cancer No family hx of      Colon Cancer No family hx of        Medications:  Current Outpatient Medications   Medication Sig Dispense Refill      aspirin (ASA) 81 MG EC tablet Take 1 tablet (81 mg) by mouth daily 90 tablet 3     atorvastatin (LIPITOR) 10 MG tablet Take 1 tablet (10 mg) by mouth daily. 90 tablet 3     calcium carbonate-vitamin D (CALTRATE) 600-10 MG-MCG per tablet Take 2 tablets by mouth daily       cholecalciferol 25 MCG (1000 UT) TABS Take 1,000 Units by mouth daily       losartan (COZAAR) 25 MG tablet Take 1 tablet (25 mg) by mouth daily. 90 tablet 3     metFORMIN (GLUCOPHAGE XR) 500 MG 24 hr tablet TAKE 2 TABLETS BY MOUTH DAILY WITH DINNER 180 tablet 1     vitamin C (ASCORBIC ACID) 500 MG tablet Take 500 mg by mouth daily       No Known Allergies              Again, thank you for allowing me to participate in the care of your patient.        Sincerely,        Cuauhtemoc Hussein MD    Electronically signed

## 2025-04-23 ENCOUNTER — TELEPHONE (OUTPATIENT)
Dept: DERMATOLOGY | Facility: CLINIC | Age: 65
End: 2025-04-23
Payer: COMMERCIAL

## 2025-04-23 DIAGNOSIS — D48.5 NEOPLASM OF UNCERTAIN BEHAVIOR OF SKIN: Primary | ICD-10-CM

## 2025-04-23 LAB
PATH REPORT.COMMENTS IMP SPEC: NORMAL
PATH REPORT.COMMENTS IMP SPEC: NORMAL
PATH REPORT.FINAL DX SPEC: NORMAL
PATH REPORT.GROSS SPEC: NORMAL
PATH REPORT.MICROSCOPIC SPEC OTHER STN: NORMAL
PATH REPORT.RELEVANT HX SPEC: NORMAL

## 2025-04-23 NOTE — TELEPHONE ENCOUNTER
----- Message from Cuauhtemoc Hussein sent at 4/23/2025 12:12 PM CDT -----  Please let pt know and refer for mohs     Final Diagnosis   A(1). Skin, L chin, shave:  - Basal cell carcinoma, superficial and nodular types - (see description)

## 2025-04-23 NOTE — TELEPHONE ENCOUNTER
Called patient:  Patient notified and educated on test results   Mohs procedure explained- all questions answered  appointment scheduled- mohs packet mailed.     Thank you,  Danna MUIR RN  Dermatology   [phone]

## 2025-05-07 ENCOUNTER — PATIENT OUTREACH (OUTPATIENT)
Dept: CARE COORDINATION | Facility: CLINIC | Age: 65
End: 2025-05-07
Payer: COMMERCIAL

## 2025-05-10 ENCOUNTER — HEALTH MAINTENANCE LETTER (OUTPATIENT)
Age: 65
End: 2025-05-10

## 2025-05-13 DIAGNOSIS — E11.9 TYPE 2 DIABETES MELLITUS WITHOUT COMPLICATION, WITHOUT LONG-TERM CURRENT USE OF INSULIN (H): ICD-10-CM

## 2025-05-13 RX ORDER — METFORMIN HYDROCHLORIDE 500 MG/1
1000 TABLET, EXTENDED RELEASE ORAL
Qty: 180 TABLET | Refills: 0 | Status: SHIPPED | OUTPATIENT
Start: 2025-05-13

## 2025-07-12 ENCOUNTER — HEALTH MAINTENANCE LETTER (OUTPATIENT)
Age: 65
End: 2025-07-12

## 2025-07-16 NOTE — PROGRESS NOTES
Surgical Office Location:  [facility]  [address] [place] [id number]

## 2025-07-17 ENCOUNTER — OFFICE VISIT (OUTPATIENT)
Dept: DERMATOLOGY | Facility: CLINIC | Age: 65
End: 2025-07-17
Payer: COMMERCIAL

## 2025-07-17 DIAGNOSIS — L81.4 LENTIGO: ICD-10-CM

## 2025-07-17 DIAGNOSIS — L82.1 SEBORRHEIC KERATOSES: ICD-10-CM

## 2025-07-17 DIAGNOSIS — C44.319 BASAL CELL CARCINOMA (BCC) OF CHIN: ICD-10-CM

## 2025-07-17 DIAGNOSIS — D23.9 DERMAL NEVUS: Primary | ICD-10-CM

## 2025-07-17 DIAGNOSIS — D18.01 ANGIOMA OF SKIN: ICD-10-CM

## 2025-07-17 NOTE — PROGRESS NOTES
Martha Neal , a 65 year old year old female patient, I was asked to see by Dr. Hussein for basal cell carcinoma on left chin.  Patient has no other skin complaints today.  Remainder of the HPI, Meds, PMH, Allergies, FH, and SH was reviewed in chart.      Past Medical History:   Diagnosis Date    Osteopenia     Type 2 diabetes mellitus (H)     White coat syndrome without hypertension        Past Surgical History:   Procedure Laterality Date    APPENDECTOMY      incidental during hysterectomy    HYSTERECTOMY, PAP NO LONGER INDICATED      fibroids + ovarian cysts; TAHBSO        Family History   Problem Relation Age of Onset    Coronary Artery Disease Father         MI and CABG in his 60s    Cerebrovascular Disease Father         60s/70s    Hyperlipidemia Father     Breast Cancer Father     Diabetes No family hx of     Coronary Artery Disease Early Onset No family hx of     Ovarian Cancer No family hx of     Colon Cancer No family hx of        Social History     Socioeconomic History    Marital status: Single     Spouse name: Not on file    Number of children: Not on file    Years of education: Not on file    Highest education level: Not on file   Occupational History    Occupation: Retired - [employer]   Tobacco Use    Smoking status: Every Day     Current packs/day: 1.00     Average packs/day: 1 pack/day for 47.0 years (47.0 ttl pk-yrs)     Types: Cigarettes    Smokeless tobacco: Never    Tobacco comments:     47 years (as of 2024); 12 cigs/day (as of 2024); 1ppd at most   Vaping Use    Vaping status: Never Used   Substance and Sexual Activity    Alcohol use: No    Drug use: No    Sexual activity: Not Currently   Other Topics Concern    Parent/sibling w/ CABG, MI or angioplasty before 65F 55M? No   Social History Narrative    Single.    No kids.    Walks 4 miles/day.     Social Drivers of Health     Financial Resource Strain: Low Risk  (4/1/2024)    Financial Resource Strain     Within the past 12 months, have you or  your family members you live with been unable to get utilities (heat, electricity) when it was really needed?: No   Food Insecurity: Low Risk  (4/1/2024)    Food Insecurity     Within the past 12 months, did you worry that your food would run out before you got money to buy more?: No     Within the past 12 months, did the food you bought just not last and you didn t have money to get more?: No   Transportation Needs: Low Risk  (4/1/2024)    Transportation Needs     Within the past 12 months, has lack of transportation kept you from medical appointments, getting your medicines, non-medical meetings or appointments, work, or from getting things that you need?: No   Physical Activity: Unknown (4/1/2024)    Exercise Vital Sign     Days of Exercise per Week: 5 days     Minutes of Exercise per Session: Not on file   Stress: No Stress Concern Present (4/1/2024)    [employer] - Occupational Stress Questionnaire     Feeling of Stress : Not at all   Social Connections: Unknown (4/1/2024)    Social Connection and Isolation Panel [NHANES]     Frequency of Communication with Friends and Family: Not on file     Frequency of Social Gatherings with Friends and Family: Once a week     Attends Synagogue Services: Not on file     Active Member of Clubs or Organizations: Not on file     Attends Club or Organization Meetings: Not on file     Marital Status: Not on file   Interpersonal Safety: Not on file   Housing Stability: Low Risk  (4/1/2024)    Housing Stability     Do you have housing? : Yes     Are you worried about losing your housing?: No       Outpatient Encounter Medications as of 7/17/2025   Medication Sig Dispense Refill    aspirin (ASA) 81 MG EC tablet Take 1 tablet (81 mg) by mouth daily 90 tablet 3    atorvastatin (LIPITOR) 10 MG tablet Take 1 tablet (10 mg) by mouth daily. 90 tablet 3    calcium carbonate-vitamin D (CALTRATE) 600-10 MG-MCG per tablet Take 2 tablets by mouth daily       cholecalciferol 25 MCG (1000 UT) TABS Take 1,000 Units by mouth daily      losartan (COZAAR) 25 MG tablet Take 1 tablet (25 mg) by mouth daily. 90 tablet 3    metFORMIN (GLUCOPHAGE XR) 500 MG 24 hr tablet TAKE 2 TABLETS BY MOUTH DAILY WITH DINNER 180 tablet 0    vitamin C (ASCORBIC ACID) 500 MG tablet Take 500 mg by mouth daily       No facility-administered encounter medications on file as of 7/17/2025.             Review Of Systems  Skin: As above  Eyes: negative  Ears/Nose/Throat: negative  Respiratory: No shortness of breath, dyspnea on exertion, cough, or hemoptysis  Cardiovascular: negative  Gastrointestinal: negative  Genitourinary: negative  Musculoskeletal: negative  Neurologic: negative  Psychiatric: negative  Hematologic/Lymphatic/Immunologic: negative  Endocrine: negative      O:   NAD, WDWN, Alert & Oriented, Mood & Affect wnl, Vitals stable   General appearance tori ii   Vitals stable   Alert, oriented and in no acute distress   L chin pink pearly papule 7mm   Stuck on papules and brown macules on face and ext    Red papules on neck    Flesh colored papules on face           Eyes: Conjunctivae/lids:Normal     ENT: Lips, mucosa: normal    MSK:Normal    Cardiovascular: peripheral edema none    Pulm: Breathing Normal    Neuro/Psych: Orientation:Normal; Mood/Affect:Normal      A/P:  1. Seborrheic keratosis, lentigo, angioma, dermal nevus  2. L chin basal cell carcinoma   It was a pleasure speaking to Martha Neal today.  Previous clinic  notes and pertinent laboratory tests were reviewed prior to Martha Neal's visit.  Signs and Symptoms of skin cancer discussed with patient.  Patient encouraged to perform monthly skin exams.  UV precautions reviewed with patient.  Risks of non-melanoma skin cancer discussed with patient   Return to clinic 6 months  PROCEDURE NOTE  L chin basal cell carcinoma   MOHS:   Location    The rationale for Mohs surgery was discussed with the patient and consent was obtained.   The risks and benefits as well as alternatives to therapy were discussed, in detail.  Specifically, the risks of infection, scarring, bleeding, prolonged wound healing, incomplete removal, allergy to anesthesia, nerve injury and recurrence were addressed.  Indication for Mohs was Location. Prior to the procedure, the treatment site was clearly identified and, if available, confirmed with previous photos and confirmed by the patient   All components of the Universal Protocol/PAUSE rule were completed.  The Mohs surgeon operated in two distinct and integrated capacities as the surgeon and pathologist.      The area was prepped with Betasept.  A rim of normal appearing skin was marked circumferentially around the lesion.  The area was infiltrated with local anesthesia.  The tumor was first debulked to remove all clinically apparent tumor.  An incision following the standard Mohs approach was done and the specimen was oriented,mapped and placed in 1 block(s).  Each specimen was then chromacoded and processed in the Mohs laboratory using standard Mohs technique and submitted for frozen section histology.  Frozen section analysis showed no residual tumor but CLEAR MARGINS.      The tumor was excised using standard Mohs technique in 1 stages(s).  CLEAR MARGINS OBTAINED and Final defect size was 1.3 x 1.1 cm.     We discussed the options for wound management in full with the patient including risks/benefits/ possible outcomes.      REPAIR COMPLEX: Because of the tightness of the surrounding skin and Because of the size and full thickness nature of the defect, Because of the tightness of the surrounding skin, To maintain form and function, In order to avoid distortion, and Because of the proximity to the vermilion, a complex closure was planned. After LE anesthesia and prep, Burow's triangles were excised in the relaxed skin tension lines. The wound edges were widely undermined greater than width of the defect on both sides  by dissection in the subcutaneous plane until adequate tissue mobility was obtained. Hemostasis was obtained. The wound edges were closed in a layered fashion using Vicryl and Fast Absorbing Plain Gut sutures. Postoperative length was 4 cm.   EBL minimal; complications none; wound care routine.  The patient was discharged in good condition and will return in one week for wound evaluation.

## 2025-07-17 NOTE — PATIENT INSTRUCTIONS
Sutured Wound Care     [facility]: [phone]    [facility]: [phone]          No strenuous activity for 48 hours. Resume moderate activity in 48 hours. No heavy exercising until you are seen for follow up in one week.     Take Tylenol as needed for discomfort.                         Do not drink alcoholic beverages for 48 hours.     Keep the pressure bandage in place for 24 hours. If the bandage becomes blood tinged or loose, reinforce it with gauze and tape.        (Refer to the reverse side of this page for management of bleeding).    Remove pressure bandage in 24 hours     Leave the flat brown bandage in place until your follow up appointment.     Keep the bandage dry. Wash around it carefully.    If the tape becomes soiled or starts to come off, reinforce it with additional paper tape.    Do not smoke for 3 weeks; smoking is detrimental to wound healing.    It is normal to have swelling and bruising around the surgical site. The bruising will fade in approximately 10-14 days. Elevate the area to reduce swelling.    Numbness, itchiness and sensitivity to temperature changes can occur after surgery and may take up to 18 months to normalize.      POSSIBLE COMPLICATIONS    BLEEDING:    Leave the bandage in place.  Use tightly rolled up gauze or a cloth to apply direct pressure over the bandage for 20  minutes.  Reapply pressure for an additional 20 minutes if necessary  Call the office or go to the nearest emergency room if pressure fails to stop the bleeding.  Use additional gauze and tape to maintain pressure once the bleeding has stopped.        PAIN:    Post operative pain should slowly get better, never worse.  A severe increase in pain may indicate a problem. Call the office if this occurs.    In case of emergency after Business hours phone:Dr Rosa [phone]      In case of emergency during Business hours phone: Nurse [phone]           Proper skin care from [facility]:    -Eliminate harsh soaps as they strip the natural oils from the skin, often resulting in dry itchy skin ( i.e. Dial, Zest, [place])  -Use mild soaps such as Cetaphil or Dove Sensitive Skin in the shower. You do not need to use soap on arms, legs, and trunk every time you shower unless visibly soiled.   -Avoid hot or cold showers.  -After showering, lightly (pat) dry off and apply moisturizing within 2-3 minutes. This will help trap moisture in the skin.   -Aggressive use of a moisturizer at least 1-2 times a day to the entire body (including -Vanicream, Cetaphil, Aquaphor or Cerave) and moisturize hands after every washing.  -We recommend using moisturizers that come in a tub that needs to be scooped out, not a pump. This has more of an oil base. It will hold moisture in your skin much better than a water base moisturizer. The above recommended are non-pore clogging.      Wear a sunscreen with at least SPF 30 on your face, ears, neck and V of the chest daily. Wear sunscreen on other areas of the body if those areas are exposed to the sun throughout the day. Sunscreens can contain physical and/or chemical blockers. Physical blockers are less likely to clog pores, these include zinc oxide and titanium dioxide. Reapply every two hour and after swimming.     Sunscreen examples: https://www.ewg.org/sunscreen/    UV radiation  UVA radiation remains constant throughout the day and throughout the year. It is a longer wavelength than UVB and therefore penetrates deeper into the skin leading to immediate and delayed tanning, photoaging, and skin cancer. 70-80% of UVA and UVB radiation occurs between the hours of 10am-2pm.  UVB radiation  UVB radiation causes the most harmful effects and is more significant during the summer months. However, snow and ice can reflect UVB radiation leading to skin damage during the winter months as well. UVB radiation is responsible for tanning, burning, inflammation, delayed  erythema (pinkness), pigmentation (brown spots), and skin cancer.     I recommend self monthly full body exams and yearly full body exams with a dermatology provider. If you develop a new or changing lesion please follow up for examination. Most skin cancers are pink and scaly or pink and pearly. However, we do see blue/brown/black skin cancers.  Consider the ABCDEs of melanoma when giving yourself your monthly full body exam ( don't forget the groin, buttocks, feet, toes, etc). A-asymmetry, B-borders, C-color, D-diameter, E-elevation or evolving. If you see any of these changes please follow up in clinic. If you cannot see your back I recommend purchasing a hand held mirror to use with a larger wall mirror.       Checking for Skin Cancer  You can find cancer early by checking your skin each month. There are 3 kinds of skin cancer. They are melanoma, basal cell carcinoma, and squamous cell carcinoma. Doing monthly skin checks is the best way to find new marks or skin changes. Follow the instructions below for checking your skin.   The ABCDEs of checking moles for melanoma   Check your moles or growths for signs of melanoma using ABCDE:   Asymmetry: the sides of the mole or growth don t match  Border: the edges are ragged, notched, or blurred  Color: the color within the mole or growth varies  Diameter: the mole or growth is larger than 6 mm (size of a pencil eraser)  Evolving: the size, shape, or color of the mole or growth is changing (evolving is not shown in the images below)    Checking for other types of skin cancer  Basal cell carcinoma or squamous cell carcinoma have symptoms such as:     A spot or mole that looks different from all other marks on your skin  Changes in how an area feels, such as itching, tenderness, or pain  Changes in the skin's surface, such as oozing, bleeding, or scaliness  A sore that does not heal  New swelling or redness beyond the border of a mole    Who s at risk?  Anyone can get  skin cancer. But you are at greater risk if you have:   Fair skin, light-colored hair, or light-colored eyes  Many moles or abnormal moles on your skin  A history of sunburns from sunlight or tanning beds  A family history of skin cancer  A history of exposure to radiation or chemicals  A weakened immune system  If you have had skin cancer in the past, you are at risk for recurring skin cancer.   How to check your skin  Do your monthly skin checkups in front of a full-length mirror. Check all parts of your body, including your:   Head (ears, face, neck, and scalp)  Torso (front, back, and sides)  Arms (tops, undersides, upper, and lower armpits)  Hands (palms, backs, and fingers, including under the nails)  Buttocks and genitals  Legs (front, back, and sides)  Feet (tops, soles, toes, including under the nails, and between toes)  If you have a lot of moles, take digital photos of them each month. Make sure to take photos both up close and from a distance. These can help you see if any moles change over time.   Most skin changes are not cancer. But if you see any changes in your skin, call your doctor right away. Only he or she can diagnose a problem. If you have skin cancer, seeing your doctor can be the first step toward getting the treatment that could save your life.   [employer] last reviewed this educational content on 4/1/2019 2000-2020 [employer]. [address] [place] [address]. All rights reserved. This information is not intended as a substitute for professional medical care. Always follow your healthcare professional's instructions.       When should I call my doctor?  If you are worsening or not improving, please, contact us or seek urgent care as noted below.     Who should I call with questions (adults)?    [facility] [phone]  For urgent needs outside of business hours call the [facility] at [phone] and ask for the dermatology  resident on call to be paged  If this is a medical emergency and you are unable to reach an ER, Call 911      If you need a prescription refill, please contact your pharmacy. Refills are approved or denied by our Physicians during normal business hours, Monday through Friday.  Per office policy, refills will not be granted if you have not been seen within the past year (or sooner depending on the condition).

## 2025-07-23 ENCOUNTER — ALLIED HEALTH/NURSE VISIT (OUTPATIENT)
Dept: DERMATOLOGY | Facility: CLINIC | Age: 65
End: 2025-07-23
Payer: COMMERCIAL

## 2025-07-23 DIAGNOSIS — Z48.01 DRESSING CHANGE OR REMOVAL, SURGICAL WOUND: Primary | ICD-10-CM

## 2025-07-23 NOTE — PATIENT INSTRUCTIONS
WOUND CARE INSTRUCTIONS  for  ONE WEEK AFTER SURGERY          Leave flat bandage on your skin for one week after today s bandage change.  In one week when you remove the bandage, you may resume your regular skin care routine, including washing with mild soap and water, applying moisturizer, make-up and sunscreen.    If there are any open or bleeding areas at the incision/graft site you should begin to cover the area with a bandage daily as follows:    Clean and dry the area with plain tap water using a Q-tip or sterile gauze pad.  Apply Polysporin or Bacitracin ointment to the open area.  Cover the wound with a band-aid or a sterile non-stick gauze pad and micropore paper tape.         SIGNS OF INFECTION  - If you notice any of these signs of infection, call your doctor right away: expanding redness around the wound.  - Yellow or greenish-colored pus or cloudy wound drainage.    - Red streaking spreading from the wound.  - Increased swelling, tenderness, or pain around the wound.   - Fever.    Please remember that yellow and clear drainage from a wound can be normal and related to normal wound healing.  Isolated drainage from a wound without a combination of the above features does not indicate infection.       *Once the bandages are removed, the scar will be red and firm (especially in the lip/chin area). This is normal and will fade in time. It might take 6-12 months for this to happen.     *Massaging the area will help the scar soften and fade quicker. Begin to massage the area one month after the bandages have been removed. To massage apply pressure directly and firmly over the scar with the fingertips and move in a circular motion. Massage the area for a few minutes several times a day. Continue to massage the site for several months.    *Approximately 6-8 weeks after surgery it is not uncommon to see the formation of  tender pimple-like  bump along the scar. This is normal. As the scar continues to mature and  the stitches underneath the skin begin to dissolve, this might occur. Do not pick or squeeze, this will resolve on it s own. Should one break open producing a small amount of drainage, apply Polysporin or Bacitracin ointment a few times a day until the wound is completely healed.    *Numbness in the surgical area is expected. It might take 12-18 months for the feeling to return to normal. During this time sensations of itchiness, tingling and occasional sharp pains might be noted. These feelings are normal and will subside once the nerves have completely healed.         IN CASE OF EMERGENCY: Dr Rosa [phone]     If you were seen in [place] call: [phone]    If you were seen in [place] call: [phone]

## 2025-07-23 NOTE — PROGRESS NOTES
Martha Neal comes into clinic today at the request of Dr. Rosa Ordering Provider for Dressing Change  .    This service provided today was under the supervising provider of the day Dr. Hussein, who was available if needed.    Please see providers note on 7/17/25 for orders  Patient returned to clinic for post surgery 1 week follow up bandage change. Patient has no complaints, denies pain.  Bandage removed from chin. Area cleansed with wound cleanser. Site is healing with no signs of infection, wound edges approximating well.   Reapplied new steri strips and paper tape.     Advised to watch for signs and symptons of infection; spreading redness, increasing pain, drainage, odor, fever.   Call or report promptly to clinic if any of these symptoms are present.    Wound care post op instructions given and discussed for 14 day bandage removal and continued incision/scar care.    Patient verbalized understanding.

## 2025-08-07 DIAGNOSIS — E11.9 TYPE 2 DIABETES MELLITUS WITHOUT COMPLICATION, WITHOUT LONG-TERM CURRENT USE OF INSULIN (H): ICD-10-CM

## 2025-08-07 RX ORDER — METFORMIN HYDROCHLORIDE 500 MG/1
1000 TABLET, EXTENDED RELEASE ORAL
Qty: 90 TABLET | Refills: 0 | Status: SHIPPED | OUTPATIENT
Start: 2025-08-07

## 2025-08-12 ENCOUNTER — TELEPHONE (OUTPATIENT)
Dept: INTERNAL MEDICINE | Facility: CLINIC | Age: 65
End: 2025-08-12
Payer: COMMERCIAL